# Patient Record
Sex: MALE | Race: BLACK OR AFRICAN AMERICAN | Employment: OTHER | URBAN - METROPOLITAN AREA
[De-identification: names, ages, dates, MRNs, and addresses within clinical notes are randomized per-mention and may not be internally consistent; named-entity substitution may affect disease eponyms.]

---

## 2017-05-05 ENCOUNTER — OP HISTORICAL/CONVERTED ENCOUNTER (OUTPATIENT)
Dept: OTHER | Age: 62
End: 2017-05-05

## 2017-11-28 ENCOUNTER — OP HISTORICAL/CONVERTED ENCOUNTER (OUTPATIENT)
Dept: OTHER | Age: 62
End: 2017-11-28

## 2020-09-08 ENCOUNTER — TELEPHONE (OUTPATIENT)
Dept: FAMILY MEDICINE CLINIC | Age: 65
End: 2020-09-08

## 2020-09-08 DIAGNOSIS — K12.2 ORAL INFECTION: Primary | ICD-10-CM

## 2020-09-08 RX ORDER — AMOXICILLIN 500 MG/1
500 CAPSULE ORAL 3 TIMES DAILY
Qty: 30 CAP | Refills: 1 | Status: SHIPPED | OUTPATIENT
Start: 2020-09-08 | End: 2022-08-19

## 2020-09-08 RX ORDER — IBUPROFEN 600 MG/1
600 TABLET ORAL
Qty: 120 TAB | Refills: 1 | Status: SHIPPED | OUTPATIENT
Start: 2020-09-08 | End: 2021-04-15 | Stop reason: SDUPTHER

## 2020-09-08 NOTE — PROGRESS NOTES
1. Oral infection    - amoxicillin (AMOXIL) 500 mg capsule; Take 1 Cap by mouth three (3) times daily for 10 days. Dispense: 30 Cap; Refill: 1  - ibuprofen (MOTRIN) 600 mg tablet; Take 1 Tab by mouth every six (6) hours as needed for Pain. Dispense: 120 Tab;  Refill: 1

## 2020-11-12 ENCOUNTER — OFFICE VISIT (OUTPATIENT)
Dept: FAMILY MEDICINE CLINIC | Age: 65
End: 2020-11-12
Payer: COMMERCIAL

## 2020-11-12 VITALS
HEIGHT: 69 IN | WEIGHT: 190 LBS | TEMPERATURE: 97.3 F | BODY MASS INDEX: 28.14 KG/M2 | DIASTOLIC BLOOD PRESSURE: 88 MMHG | OXYGEN SATURATION: 96 % | SYSTOLIC BLOOD PRESSURE: 159 MMHG | HEART RATE: 77 BPM

## 2020-11-12 DIAGNOSIS — I10 ESSENTIAL HYPERTENSION: ICD-10-CM

## 2020-11-12 DIAGNOSIS — E78.2 MIXED HYPERLIPIDEMIA: ICD-10-CM

## 2020-11-12 DIAGNOSIS — E11.9 TYPE 2 DIABETES MELLITUS WITHOUT COMPLICATION, WITHOUT LONG-TERM CURRENT USE OF INSULIN (HCC): ICD-10-CM

## 2020-11-12 DIAGNOSIS — N52.9 ERECTILE DYSFUNCTION, UNSPECIFIED ERECTILE DYSFUNCTION TYPE: ICD-10-CM

## 2020-11-12 DIAGNOSIS — Z12.5 SCREENING FOR PROSTATE CANCER: ICD-10-CM

## 2020-11-12 DIAGNOSIS — Z00.00 ENCOUNTER FOR ROUTINE ADULT HEALTH EXAMINATION WITHOUT ABNORMAL FINDINGS: Primary | ICD-10-CM

## 2020-11-12 PROCEDURE — 99397 PER PM REEVAL EST PAT 65+ YR: CPT | Performed by: FAMILY MEDICINE

## 2020-11-12 RX ORDER — ATORVASTATIN CALCIUM 20 MG/1
TABLET, FILM COATED ORAL DAILY
COMMUNITY
End: 2021-04-15 | Stop reason: SDUPTHER

## 2020-11-12 RX ORDER — GUAIFENESIN 100 MG/5ML
81 LIQUID (ML) ORAL DAILY
COMMUNITY

## 2020-11-12 RX ORDER — SILDENAFIL 100 MG/1
100 TABLET, FILM COATED ORAL AS NEEDED
COMMUNITY
End: 2021-12-22 | Stop reason: ALTCHOICE

## 2020-11-12 RX ORDER — INSULIN GLARGINE 300 U/ML
INJECTION, SOLUTION SUBCUTANEOUS
Qty: 15 ML | Refills: 5 | Status: SHIPPED | OUTPATIENT
Start: 2020-11-12 | End: 2021-12-22 | Stop reason: DRUGHIGH

## 2020-11-12 RX ORDER — IBUPROFEN 600 MG/1
TABLET ORAL
COMMUNITY
End: 2020-12-30 | Stop reason: SDUPTHER

## 2020-11-12 RX ORDER — NAPROXEN SODIUM 220 MG
220 TABLET ORAL 2 TIMES DAILY WITH MEALS
COMMUNITY
End: 2021-12-22 | Stop reason: ALTCHOICE

## 2020-11-12 RX ORDER — LISINOPRIL 40 MG/1
40 TABLET ORAL DAILY
COMMUNITY
End: 2021-01-18

## 2020-11-12 NOTE — PATIENT INSTRUCTIONS
     
Routine Health maintenance: You need to get a yearly follow up/physical exam to review, discuss age and gender appropriate exams, labs, vaccines and screening tests. This includes cardiovascular health risk, cancer screens and other lonny related topics. Medications-take all medications as directed. Please do not stop unless you talk to your doctor or health care provider first. Report any problems immediately. Referrals: if you have been given a referral, please call the office if you do not hear from provider in one week. You may make the appointment yourself. Please keep all appointments with specialists and ask them to send their notes, thoughts, recommendations to us , as your PCP. Imaging/Labs:  Be sure to get these images in a timely manner. IF your test must be scheduled, let us know if you need help getting this done and if you do not hear from that provider in a week , call us or them. BE SURE to call the office if you do not hear regarding the results in one week after the test is performed Image or lab). It is our intention to inform you of the results ALWAYS, even if normal you should get a notification (Call, portal message). PLEASE isabel if you do not get the results. PLEASE follow all recommendations and call/come in /ask questions if you do not understand of if problems develop after or in between visits. Failure to comply with recommended health care advise could result in serious health consequences. Thank you for choosing our practice and please let us know how we can help you feel better and stay well!

## 2020-11-12 NOTE — PROGRESS NOTES
ID:  Inocencio Saldivar. Nelson Hanna , 77 y.o., male      CHIEF COMPLAINT:   Chief Complaint   Patient presents with    Physical         HISTORY OF PRESENT ILLNESS:    Ck Fitzgerald is a 77 y.o. male with the below listed medical problems whom comes in today for follow up yearly. 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 He is having no chest pain, shortness breath, orthopnea or PND. He continues to smoke. He denies any abdominal pain or nausea. He does need a refill of his insulin. He says his sugars are \"about the same \". He has no numbness, burning, pain, weakness in his arms or legs. PAST MEDICAL HISTORY:   Patient Active Problem List   Diagnosis Code    Hypertension I10    Hyperlipidemia E78.5    Erectile dysfunction N52.9    Diabetes ([facility] [address] E11.9        ALLERGIES:   No Known Allergies      MEDICATIONS:     Current Outpatient Medications:     aspirin (Nino Chewable Aspirin) 81 mg chewable tablet, Take 81 mg by mouth daily. , Disp: , Rfl:     sildenafil citrate (VIAGRA) 100 mg tablet, Take 100 mg by mouth as needed for Erectile Dysfunction. , Disp: , Rfl:     lisinopriL (PRINIVIL, ZESTRIL) 40 mg tablet, Take 40 mg by mouth daily. , Disp: , Rfl:     atorvastatin (LIPITOR) 20 mg tablet, Take  by mouth daily. , Disp: , Rfl:     naproxen sodium (Aleve) 220 mg tablet, Take 220 mg by mouth two (2) times daily (with meals). , Disp: , Rfl:     ibuprofen (MOTRIN) 600 mg tablet, Take  by mouth every six (6) hours as needed for Pain., Disp: , Rfl:     SITagliptin (Januvia) 50 mg tablet, Take 50 mg by mouth daily. , Disp: , Rfl:     insulin glargine U-300 conc (Toujeo Max U-300 SoloStar) 300 unit/mL (3 mL) inpn, 70 units subcutaneously every day, Disp: 15 mL, Rfl: 5    SOCIAL:   Social History     Tobacco Use    Smoking status: Current Every Day Smoker     Packs/day: 0.25    Smokeless tobacco: Never Used   Substance Use Topics    Alcohol use: Not Currently    Drug use: Never       SURGERIES:   History reviewed. No pertinent surgical history. FAMILY HX:  History reviewed. No pertinent family history. REVIEW OF SYSTEMS:  I personally collected this information from all available source present (patient/others in room and records available) -JLEWISDO    Review of Systems   Constitutional: Negative for activity change, appetite change, chills, diaphoresis, fever and unexpected weight change. HENT: Negative for congestion, ear discharge, ear pain, hearing loss, rhinorrhea, sinus pressure, sneezing, sore throat, tinnitus, trouble swallowing and voice change. Eyes: Negative for photophobia, pain, discharge and visual disturbance. Respiratory: Negative for cough, chest tightness, shortness of breath and wheezing. Cardiovascular: Negative for chest pain, palpitations and leg swelling. Gastrointestinal: Negative for abdominal distention, abdominal pain, blood in stool, constipation, diarrhea, nausea and vomiting. Endocrine: Negative for cold intolerance, heat intolerance, polydipsia and polyphagia. Genitourinary: Negative for difficulty urinating, dysuria, frequency, hematuria and urgency. Musculoskeletal: Positive for arthralgias, back pain, joint swelling and myalgias. Negative for gait problem and neck pain. Skin: Negative for color change and rash. Neurological: Negative for dizziness, tremors, syncope, speech difficulty, weakness, light-headedness, numbness and headaches. Hematological: Negative for adenopathy. Does not bruise/bleed easily. Psychiatric/Behavioral: Negative for agitation, behavioral problems, confusion, decreased concentration, dysphoric mood, hallucinations, sleep disturbance and suicidal ideas. The patient is not nervous/anxious.            VITAL SIGNS:   Visit Vitals  BP (!) 159/88 (BP 1 Location: Left arm, BP Patient Position: Sitting)   Pulse 77   Temp 97.3 °F (36.3 °C) (Temporal)   Ht 5' 9\" (1.753 m)   Wt 190 lb (86.2 kg)   SpO2 96%   BMI 28.06 kg/m²           PHYSICAL EXAMINATION:  Physical Exam  Nursing note reviewed. Constitutional:       General: He is not in acute distress. Appearance: Normal appearance. He is not ill-appearing or toxic-appearing. HENT:      Right Ear: Tympanic membrane, ear canal and external ear normal.      Left Ear: Tympanic membrane, ear canal and external ear normal.      Nose: No congestion or rhinorrhea. Mouth/Throat:      Pharynx: No oropharyngeal exudate or posterior oropharyngeal erythema. Eyes:      General: No scleral icterus. Extraocular Movements: Extraocular movements intact. Conjunctiva/sclera: Conjunctivae normal.      Pupils: Pupils are equal, round, and reactive to light. Neck:      Musculoskeletal: No neck rigidity or muscular tenderness. Vascular: No carotid bruit. Cardiovascular:      Rate and Rhythm: Normal rate and regular rhythm. Heart sounds: No murmur. No friction rub. No gallop. Pulmonary:      Effort: Pulmonary effort is normal.      Breath sounds: Normal breath sounds. No wheezing, rhonchi or rales. Abdominal:      General: Bowel sounds are normal. There is no distension. Palpations: Abdomen is soft. There is no mass. Tenderness: There is no abdominal tenderness. Musculoskeletal:         General: Tenderness and deformity present. No swelling. Right lower leg: No edema. Left lower leg: No edema. Comments: Arthritic hands and knees. Lymphadenopathy:      Cervical: No cervical adenopathy. Skin:     Capillary Refill: Capillary refill takes less than 2 seconds. Coloration: Skin is not jaundiced. Findings: No erythema or rash. Neurological:      General: No focal deficit present. Mental Status: He is alert and oriented to person, place, and time. Mental status is at baseline. Cranial Nerves: No cranial nerve deficit. Sensory: No sensory deficit.       Coordination: Coordination normal.      Gait: Gait normal.   Psychiatric:         Mood and Affect: Mood normal.         Behavior: Behavior normal.         Thought Content: Thought content normal.         Judgment: Judgment normal.         ASSESSMENT/PLAN:    The following diagnoses were identified today, reviewed and discussed. 1. Encounter for routine adult health examination without abnormal findings    The patient and I discussed all age and gender appropriate screening exams. Risk of non compliance discussed including missing early, treatable and possibly curable serious health issues. Labs, imaging and vaccinations as well as any other pertinent testing was ordered if appropriate.        - METABOLIC PANEL, COMPREHENSIVE  - LIPID PANEL  - URINALYSIS W/ RFLX MICROSCOPIC    2. Type 2 diabetes mellitus without complication, without long-term current use of insulin (HCC)    - HEMOGLOBIN A1C WITH EAG  - insulin glargine U-300 conc (Toujeo Max U-300 SoloStar) 300 unit/mL (3 mL) inpn; 70 units subcutaneously every day  Dispense: 15 mL; Refill: 5    3. Essential hypertension    - TSH 3RD GENERATION  - URINALYSIS W/ RFLX MICROSCOPIC    4. Mixed hyperlipidemia      5. Erectile dysfunction, unspecified erectile dysfunction type      6. Screening for prostate cancer    - PSA SCREENING (SCREENING)          Discussion:    The patient and I discussed the following items/issues; Each diagnosis listed for today's visit including medications, treatment, testing such as labs, imagine, referrals and when to call regarding results and appointments. Reminded patient to keep any and all appointments with specialists, labs, imaging. Reminded patient to make sure we get copies of any specialists care, labs and imaging. Reminded patient to call of come by the office if there are any concerns, questions , comments or problems.     The patient verbalized understanding of the care plan and all questions were answered to the patient's satisfaction prior to leaving the office. The patient was told that failure to comply with recommended testing could result in abnormal health consequences. The patient was instructed to have yearly routine health maintenance including but not limited to age appropriate vaccines, testing, screening exams. The patient actively participated in medical decision making. FOLLOW UP:   Patient knows to keep any and all future visits scheduled unless told otherwise. Patient knows to call, come back if any concerns, questions, comments or problems arise. This visit may have been completed , in part, with voice recognition software as well as typing and may have syntax errors despite editing.       Leia Peabody, DO

## 2020-12-01 LAB
ALBUMIN SERPL-MCNC: 4.4 G/DL (ref 3.8–4.8)
ALBUMIN/GLOB SERPL: 1.6 {RATIO} (ref 1.2–2.2)
ALP SERPL-CCNC: 71 IU/L (ref 39–117)
ALT SERPL-CCNC: 39 IU/L (ref 0–44)
APPEARANCE UR: CLEAR
AST SERPL-CCNC: 24 IU/L (ref 0–40)
BILIRUB SERPL-MCNC: 0.3 MG/DL (ref 0–1.2)
BILIRUB UR QL STRIP: NEGATIVE
BUN SERPL-MCNC: 13 MG/DL (ref 8–27)
BUN/CREAT SERPL: 10 (ref 10–24)
CALCIUM SERPL-MCNC: 9.7 MG/DL (ref 8.6–10.2)
CHLORIDE SERPL-SCNC: 100 MMOL/L (ref 96–106)
CHOLEST SERPL-MCNC: 151 MG/DL (ref 100–199)
CO2 SERPL-SCNC: 24 MMOL/L (ref 20–29)
COLOR UR: YELLOW
CREAT SERPL-MCNC: 1.25 MG/DL (ref 0.76–1.27)
EST. AVERAGE GLUCOSE BLD GHB EST-MCNC: 243 MG/DL
GLOBULIN SER CALC-MCNC: 2.7 G/DL (ref 1.5–4.5)
GLUCOSE SERPL-MCNC: 145 MG/DL (ref 65–99)
GLUCOSE UR QL: NEGATIVE
HBA1C MFR BLD: 10.1 % (ref 4.8–5.6)
HDLC SERPL-MCNC: 40 MG/DL
HGB UR QL STRIP: NEGATIVE
KETONES UR QL STRIP: ABNORMAL
LDLC SERPL CALC-MCNC: 87 MG/DL (ref 0–99)
LEUKOCYTE ESTERASE UR QL STRIP: NEGATIVE
MICRO URNS: ABNORMAL
NITRITE UR QL STRIP: NEGATIVE
PH UR STRIP: 5.5 [PH] (ref 5–7.5)
POTASSIUM SERPL-SCNC: 4.9 MMOL/L (ref 3.5–5.2)
PROT SERPL-MCNC: 7.1 G/DL (ref 6–8.5)
PROT UR QL STRIP: ABNORMAL
PSA SERPL-MCNC: <0.1 NG/ML (ref 0–4)
SODIUM SERPL-SCNC: 138 MMOL/L (ref 134–144)
SP GR UR: 1.03 (ref 1–1.03)
TRIGL SERPL-MCNC: 135 MG/DL (ref 0–149)
TSH SERPL DL<=0.005 MIU/L-ACNC: 0.85 UIU/ML (ref 0.45–4.5)
UROBILINOGEN UR STRIP-MCNC: 1 MG/DL (ref 0.2–1)
VLDLC SERPL CALC-MCNC: 24 MG/DL (ref 5–40)

## 2020-12-01 NOTE — PROGRESS NOTES
Liver and kidney function are normal  Cholesterol is great  Thyroid and prostate are normal!    Urine is christiano (has some ketones but that is from fasting)  Sugar is not good, A1c is 10!  Go up to 80 on the insulin (his chart says he is on 70 , if so then go up  To 80)

## 2020-12-30 RX ORDER — IBUPROFEN 600 MG/1
600 TABLET ORAL
Qty: 90 TAB | Refills: 5 | Status: SHIPPED | OUTPATIENT
Start: 2020-12-30 | End: 2021-06-08 | Stop reason: SDUPTHER

## 2021-01-18 RX ORDER — LISINOPRIL 40 MG/1
TABLET ORAL
Qty: 90 TAB | Refills: 1 | Status: SHIPPED | OUTPATIENT
Start: 2021-01-18 | End: 2021-05-11

## 2021-02-02 ENCOUNTER — TELEPHONE (OUTPATIENT)
Dept: FAMILY MEDICINE CLINIC | Age: 66
End: 2021-02-02

## 2021-02-02 DIAGNOSIS — E11.9 CONTROLLED TYPE 2 DIABETES MELLITUS WITHOUT COMPLICATION, WITHOUT LONG-TERM CURRENT USE OF INSULIN (HCC): ICD-10-CM

## 2021-02-03 RX ORDER — SITAGLIPTIN AND METFORMIN HYDROCHLORIDE 50; 500 MG/1; MG/1
1 TABLET, FILM COATED, EXTENDED RELEASE ORAL 2 TIMES DAILY WITH MEALS
Qty: 60 TAB | Refills: 6 | Status: SHIPPED | OUTPATIENT
Start: 2021-02-03 | End: 2022-03-18 | Stop reason: DRUGHIGH

## 2021-02-03 RX ORDER — ATORVASTATIN CALCIUM 20 MG/1
20 TABLET, FILM COATED ORAL DAILY
Qty: 30 TAB | Refills: 5 | Status: SHIPPED | OUTPATIENT
Start: 2021-02-03 | End: 2021-11-08

## 2021-02-03 NOTE — TELEPHONE ENCOUNTER
Identifying Data:  77 y.o. , Yocasta Abreu , male     Historical Data Reviewed ; Allergies-  No Known Allergies  Current medication as of last visit and reconciliation-  Current Outpatient Medications on File Prior to Visit   Medication Sig Dispense Refill    ibuprofen (MOTRIN) 600 mg tablet Take 1 Tab by mouth every six (6) hours as needed for Pain. 120 Tab 1    TOUJEO SOLOSTAR 300 unit/mL (1.5 mL) inpn INJECT 60 UNITS UNDER THE SKIN AT NIGHT STOP LANTUS 4.5 mL 6    AFREZZA 4 unit CtDv INHALE 4 UNITS BY MOUTH BEFORE BREAKFAST, LUNCH AND DINNER. MAX OF 12 UNITS 90 g 6    metoprolol succinate (TOPROL-XL) 50 mg XL tablet Take 50 mg by mouth daily. 5    lisinopril (PRINIVIL, ZESTRIL) 40 mg tablet Take 40 mg by mouth daily. 5    aspirin 81 mg chewable tablet Take 81 mg by mouth daily.  glucose blood VI test strips (ONETOUCH VERIO) strip Test 3 times daily. Dx code 250.00 100 Strip 6    Lancets (ONE TOUCH DELICA) misc Test 3 times daily. Dx code 250.00 100 Each 6    [DISCONTINUED] atorvastatin (LIPITOR) 20 mg tablet Take 20 mg by mouth daily. 5    [DISCONTINUED] sitaGLIPtin-metFORMIN (JANUMET XR)  mg TM24 Take 1 Tab by mouth two (2) times daily (with meals). To use with free trial offer and savings card 60 Tab 6    docusate sodium (COLACE) 100 mg capsule Take 1 Cap by mouth two (2) times a day. 60 Cap 2     No current facility-administered medications on file prior to visit. Past Medical History-  Patient Active Problem List   Diagnosis Code    Prostate cancer ([facility] [address] C61       Assessment and Plan:    ICD-10-CM ICD-9-CM    1.  Controlled type 2 diabetes mellitus without complication, without long-term current use of insulin (HCC)  E11.9 250.00 atorvastatin (LIPITOR) 20 mg tablet      SITagliptin-metFORMIN (Janumet XR)  mg TM24           Kath Grade, DO

## 2021-04-15 ENCOUNTER — OFFICE VISIT (OUTPATIENT)
Dept: FAMILY MEDICINE CLINIC | Age: 66
End: 2021-04-15
Payer: COMMERCIAL

## 2021-04-15 ENCOUNTER — TELEPHONE (OUTPATIENT)
Dept: FAMILY MEDICINE CLINIC | Age: 66
End: 2021-04-15

## 2021-04-15 VITALS
TEMPERATURE: 97.8 F | DIASTOLIC BLOOD PRESSURE: 82 MMHG | HEART RATE: 90 BPM | HEIGHT: 69 IN | OXYGEN SATURATION: 97 % | BODY MASS INDEX: 28.5 KG/M2 | SYSTOLIC BLOOD PRESSURE: 145 MMHG | WEIGHT: 192.4 LBS

## 2021-04-15 DIAGNOSIS — E11.9 TYPE 2 DIABETES MELLITUS WITHOUT COMPLICATION, WITHOUT LONG-TERM CURRENT USE OF INSULIN (HCC): ICD-10-CM

## 2021-04-15 DIAGNOSIS — K04.7 DENTAL ABSCESS: Primary | ICD-10-CM

## 2021-04-15 PROCEDURE — 99213 OFFICE O/P EST LOW 20 MIN: CPT | Performed by: FAMILY MEDICINE

## 2021-04-15 RX ORDER — CHLORHEXIDINE GLUCONATE 1.2 MG/ML
15 RINSE ORAL EVERY 12 HOURS
Qty: 420 ML | Refills: 0 | Status: SHIPPED | OUTPATIENT
Start: 2021-04-15 | End: 2021-04-29

## 2021-04-15 RX ORDER — PENICILLIN V POTASSIUM 500 MG/1
500 TABLET, FILM COATED ORAL 4 TIMES DAILY
Qty: 40 TAB | Refills: 0 | Status: SHIPPED | OUTPATIENT
Start: 2021-04-15 | End: 2021-09-09 | Stop reason: SDUPTHER

## 2021-04-15 NOTE — PROGRESS NOTES
IDENTIFYING INFORMATION:  Nazanin Ricks. Amara Quach , 77 y.o., male  [address] [address]     CHIEF COMPLAINT:     Chief Complaint   Patient presents with    Swelling     c/o swelling in right side of face. HISTORY OF PRESENT ILLNESS:    [facility] Ramu is a 77 y.o. male  has a past medical history of Cancer ([facility] [address] Colloid cyst of brain ([facility] [address] (6/2011), Constipation, Diabetes ([address] Diabetes ([address] Erectile dysfunction, GERD (gastroesophageal reflux disease), Hyperlipidemia, Hypertension, and Migraines. .  he comes in for pain right upper dental area into cheek. Been two weeks  Had a few penicillin left, no help. No fever, chills, sweats. Does have poorly controlled diabetes. Trying to get in with DDS. PAST MEDICAL HISTORY:     Past Medical History:   Diagnosis Date    Cancer [facility])     prostate    Colloid cyst of brain ([facility] [address] 6/2011    S/P excision cyst at [address]    Constipation     Diabetes ([facility] [address]     IDDM    Diabetes ([facility] [address]     Erectile dysfunction     GERD (gastroesophageal reflux disease)     Hyperlipidemia     Hypertension     Migraines        MEDICATIONS:     Current Outpatient Medications on File Prior to Visit   Medication Sig Dispense Refill    atorvastatin (LIPITOR) 20 mg tablet Take 1 Tab by mouth daily. 30 Tab 5    SITagliptin-metFORMIN (Janumet XR)  mg TM24 Take 1 Tab by mouth two (2) times daily (with meals). To use with free trial offer and savings card 60 Tab 6    lisinopriL (PRINIVIL, ZESTRIL) 40 mg tablet TAKE 1 TABLET BY MOUTH EVERY DAY 90 Tab 1    ibuprofen (MOTRIN) 600 mg tablet Take 1 Tab by mouth every six (6) hours as needed for Pain. 90 Tab 5    aspirin (Nino Chewable Aspirin) 81 mg chewable tablet Take 81 mg by mouth daily.  sildenafil citrate (VIAGRA) 100 mg tablet Take 100 mg by mouth as needed for Erectile Dysfunction.  naproxen sodium (Aleve) 220 mg tablet Take 220 mg by mouth two (2) times daily (with meals).       insulin glargine U-300 conc (Toujeo Max U-300 SoloStar) 300 unit/mL (3 mL) inpn 70 units subcutaneously every day 15 mL 5    AFREZZA 4 unit CtDv INHALE 4 UNITS BY MOUTH BEFORE BREAKFAST, LUNCH AND DINNER. MAX OF 12 UNITS 90 g 6    metoprolol succinate (TOPROL-XL) 50 mg XL tablet Take 50 mg by mouth daily. 5    glucose blood VI test strips (ONETOUCH VERIO) strip Test 3 times daily. Dx code 250.00 100 Strip 6    Lancets (ONE TOUCH DELICA) misc Test 3 times daily. Dx code 250.00 100 Each 6    docusate sodium (COLACE) 100 mg capsule Take 1 Cap by mouth two (2) times a day. 60 Cap 2    [DISCONTINUED] atorvastatin (LIPITOR) 20 mg tablet Take  by mouth daily.  [DISCONTINUED] SITagliptin (Januvia) 50 mg tablet Take 50 mg by mouth daily.  [DISCONTINUED] ibuprofen (MOTRIN) 600 mg tablet Take 1 Tab by mouth every six (6) hours as needed for Pain. 120 Tab 1    [DISCONTINUED] TOUJEO SOLOSTAR 300 unit/mL (1.5 mL) inpn INJECT 60 UNITS UNDER THE SKIN AT NIGHT STOP LANTUS 4.5 mL 6    [DISCONTINUED] lisinopril (PRINIVIL, ZESTRIL) 40 mg tablet Take 40 mg by mouth daily. 5    [DISCONTINUED] aspirin 81 mg chewable tablet Take 81 mg by mouth daily. No current facility-administered medications on file prior to visit.         ALLERGIES:    No Known Allergies      SOCIAL HISTORY:     Social History     Tobacco Use    Smoking status: Current Every Day Smoker     Packs/day: 0.25    Smokeless tobacco: Never Used   Substance Use Topics    Alcohol use: Not Currently    Drug use: Never       SURGICAL HISTORY:    Past Surgical History:   Procedure Laterality Date    HX GI  2012    HX HEENT  2006    exc mass right neck    HX OTHER SURGICAL  2012    brain    HX UROLOGICAL      prostate bx    NEUROLOGICAL PROCEDURE UNLISTED  6-2011    removal cranial colloid cyst at [address] [facility],[address]:    Family History   Problem Relation Age of Onset    Diabetes Mother     Heart Disease Mother     Stroke Mother     Heart Disease Father     Stroke Father     Hypertension Brother     Elevated Lipids Brother          REVIEW OF SYSTEMS:    I personally collected this information from all available source present (patient/others in room and records available) -JLEWISDO    Review of Systems   Constitutional: Negative for chills, diaphoresis, fever and weight loss. HENT: Positive for congestion, sinus pain and sore throat. Negative for ear pain, hearing loss, nosebleeds and tinnitus. Eyes: Negative for blurred vision, double vision, photophobia and pain. Respiratory: Negative for cough, sputum production and shortness of breath. Cardiovascular: Negative for chest pain, palpitations, orthopnea, claudication, leg swelling and PND. Gastrointestinal: Negative for abdominal pain, blood in stool, constipation, diarrhea, heartburn, melena, nausea and vomiting. Genitourinary: Negative for dysuria, frequency and urgency. Musculoskeletal: Positive for joint pain. Negative for back pain, falls, myalgias and neck pain. Skin: Negative for itching and rash. Neurological: Negative for dizziness, tingling, tremors, sensory change, focal weakness and headaches. Psychiatric/Behavioral: Negative for depression and suicidal ideas. The patient is not nervous/anxious and does not have insomnia. PHYSICAL EXAMINATION:    Vital Signs:    Visit Vitals  BP (!) 145/82 (BP 1 Location: Left upper arm, BP Patient Position: Sitting)   Pulse 90   Temp 97.8 °F (36.6 °C) (Temporal)   Ht 5' 9\" (1.753 m)   Wt 192 lb 6.4 oz (87.3 kg)   SpO2 97%   BMI 28.41 kg/m²         Wt Readings from Last 3 Encounters:   04/15/21 192 lb 6.4 oz (87.3 kg)   11/12/20 190 lb (86.2 kg)   04/29/15 204 lb (92.5 kg)     BP Readings from Last 3 Encounters:   04/15/21 (!) 145/82   11/12/20 (!) 159/88   04/29/15 (!) 152/98         Physical Exam  Vitals signs reviewed.    Constitutional:       General: He is in acute distress (He looks uncomfortable but not toxic.). Appearance: He is not ill-appearing or toxic-appearing. HENT:      Right Ear: Tympanic membrane, ear canal and external ear normal.      Left Ear: Tympanic membrane, ear canal and external ear normal.      Nose: Congestion and rhinorrhea present. Mouth/Throat:      Mouth: Mucous membranes are moist.      Pharynx: Posterior oropharyngeal erythema present. Comments: Right upper incisor is definitely decayed and swollen and some pain up into the sinus area. That area is swollen and very tender. Eyes:      General: No scleral icterus. Extraocular Movements: Extraocular movements intact. Conjunctiva/sclera: Conjunctivae normal.      Pupils: Pupils are equal, round, and reactive to light. Neck:      Musculoskeletal: No neck rigidity or muscular tenderness. Vascular: No carotid bruit. Cardiovascular:      Rate and Rhythm: Normal rate and regular rhythm. Pulses: Normal pulses. Heart sounds: No murmur. No friction rub. No gallop. Pulmonary:      Effort: Pulmonary effort is normal.      Breath sounds: Normal breath sounds. No wheezing, rhonchi or rales. Abdominal:      General: There is no distension. Palpations: Abdomen is soft. There is no mass. Tenderness: There is no abdominal tenderness. There is no guarding. Musculoskeletal:         General: Tenderness and deformity present. No swelling or signs of injury. Right lower leg: No edema. Left lower leg: No edema. Lymphadenopathy:      Cervical: No cervical adenopathy. Skin:     General: Skin is warm and dry. Capillary Refill: Capillary refill takes less than 2 seconds. Coloration: Skin is not jaundiced. Findings: No bruising, erythema or rash. Neurological:      General: No focal deficit present. Mental Status: He is alert and oriented to person, place, and time. Cranial Nerves: No cranial nerve deficit. Sensory: No sensory deficit.       Motor: No weakness. Coordination: Coordination normal.      Gait: Gait normal.      Deep Tendon Reflexes: Reflexes normal.   Psychiatric:         Mood and Affect: Mood normal.         Behavior: Behavior normal.         Thought Content: Thought content normal.         Judgment: Judgment normal.         ASSESSMENT/PLAN:    Discussion (regarding today's visit with Kyra Woodson);        ICD-10-CM ICD-9-CM    1. Dental abscess  K04.7 522. 5 penicillin v potassium (VEETID) 500 mg tablet      chlorhexidine (PERIDEX) 0.12 % solution   2. Type 2 diabetes mellitus without complication, without long-term current use of insulin ([facility])  E11.9 250.00      WE reviewed each diagnosis listed for today's visit including medications, treatment, testing such as labs, imagine, referrals and when to call regarding results and appointments. Reminded patient to keep any and all appointments with specialists, labs, imaging. Reminded patient to make sure we get copies of any specialists care, labs and imaging. Reminded patient to call of come by the office if there are any concerns, questions , comments or problems. The patient verbalized understanding of the care plan and all questions were answered to the patient's satisfaction prior to leaving the office. The patient was told that failure to comply with recommended testing could result in abnormal health consequences. The patient was instructed to have yearly routine health maintenance including but not limited to age appropriate vaccines, testing, screening exams. ALL questions were answered to his satisfaction before leaving the office. The patient actively participated in medical decision making. FOLLOW UP:     Patient knows to keep any and all future visits scheduled unless told otherwise. Patient knows to call, come back if any concerns, questions, comments or problems arise.         Follow-up and Dispositions    · Return if symptoms worsen or fail to improve. This visit may have been completed , in part, with voice recognition software as well as typing and may have syntax errors despite editing.       Reynold Mayberry, DO

## 2021-04-15 NOTE — PROGRESS NOTES
1. Have you been to the ER, urgent care clinic since your last visit? Hospitalized since your last visit? No    2. Have you seen or consulted any other health care providers outside of the [address] [place] [address] since your last visit? Include any pap smears or colon screening. No    Chief Complaint   Patient presents with    Swelling     c/o swelling in right side of face.        Visit Vitals  BP (!) 157/81 (BP 1 Location: Left upper arm, BP Patient Position: Sitting)   Pulse 96   Temp 97.8 °F (36.6 °C) (Temporal)   Ht 5' 9\" (1.753 m)   Wt 192 lb 6.4 oz (87.3 kg)   SpO2 97%   BMI 28.41 kg/m²

## 2021-05-11 ENCOUNTER — TELEPHONE (OUTPATIENT)
Dept: FAMILY MEDICINE CLINIC | Age: 66
End: 2021-05-11

## 2021-05-11 DIAGNOSIS — Z29.8 SBE (SUBACUTE BACTERIAL ENDOCARDITIS) PROPHYLAXIS CANDIDATE: Primary | ICD-10-CM

## 2021-05-11 RX ORDER — LISINOPRIL 40 MG/1
TABLET ORAL
Qty: 90 TAB | Refills: 1 | Status: SHIPPED | OUTPATIENT
Start: 2021-05-11 | End: 2022-03-22 | Stop reason: SDUPTHER

## 2021-05-14 RX ORDER — PENICILLIN V POTASSIUM 500 MG/1
TABLET, FILM COATED ORAL
Qty: 4 TAB | Refills: 2 | Status: SHIPPED | OUTPATIENT
Start: 2021-05-14 | End: 2021-12-22 | Stop reason: ALTCHOICE

## 2021-05-14 NOTE — TELEPHONE ENCOUNTER
Identifying Data:  77 y.o. , Sloane Marcos , male     HISTORICAL DATA (REVIEWED TODAY):  ALLERGIES-    No Known Allergies    MEDICATION AS OF LAST RECONCILIATION (NOT INCLUDING CHANGES MADE TODAY):    Current Outpatient Medications on File Prior to Visit   Medication Sig Dispense Refill    lisinopriL (PRINIVIL, ZESTRIL) 40 mg tablet TAKE 1 TABLET BY MOUTH EVERY DAY 90 Tab 1    penicillin v potassium (VEETID) 500 mg tablet Take 1 Tab by mouth four (4) times daily. 40 Tab 0    atorvastatin (LIPITOR) 20 mg tablet Take 1 Tab by mouth daily. 30 Tab 5    SITagliptin-metFORMIN (Janumet XR)  mg TM24 Take 1 Tab by mouth two (2) times daily (with meals). To use with free trial offer and savings card 60 Tab 6    ibuprofen (MOTRIN) 600 mg tablet Take 1 Tab by mouth every six (6) hours as needed for Pain. 90 Tab 5    aspirin (Nino Chewable Aspirin) 81 mg chewable tablet Take 81 mg by mouth daily.  sildenafil citrate (VIAGRA) 100 mg tablet Take 100 mg by mouth as needed for Erectile Dysfunction.  naproxen sodium (Aleve) 220 mg tablet Take 220 mg by mouth two (2) times daily (with meals).  insulin glargine U-300 conc (Toujeo Max U-300 SoloStar) 300 unit/mL (3 mL) inpn 70 units subcutaneously every day 15 mL 5    AFREZZA 4 unit CtDv INHALE 4 UNITS BY MOUTH BEFORE BREAKFAST, LUNCH AND DINNER. MAX OF 12 UNITS 90 g 6    metoprolol succinate (TOPROL-XL) 50 mg XL tablet Take 50 mg by mouth daily. 5    glucose blood VI test strips (ONETOUCH VERIO) strip Test 3 times daily. Dx code 250.00 100 Strip 6    Lancets (ONE TOUCH DELICA) misc Test 3 times daily. Dx code 250.00 100 Each 6    docusate sodium (COLACE) 100 mg capsule Take 1 Cap by mouth two (2) times a day. 60 Cap 2     No current facility-administered medications on file prior to visit.         PAST MEDICAL HISTORY:    Patient Active Problem List   Diagnosis Code    Prostate cancer ([facility] C61    Hypertension I10    Hyperlipidemia E78.5    Erectile dysfunction N52.9    Diabetes ([facility] [address] E11.9       ASSESSMENT AND PLAN:      ICD-10-CM ICD-9-CM    1.  SBE (subacute bacterial endocarditis) prophylaxis candidate  Z29.8 V07.8 penicillin v potassium (VEETID) 500 mg tablet             Queen Brigitte, DO

## 2021-06-08 ENCOUNTER — OFFICE VISIT (OUTPATIENT)
Dept: FAMILY MEDICINE CLINIC | Age: 66
End: 2021-06-08
Payer: COMMERCIAL

## 2021-06-08 VITALS
HEART RATE: 103 BPM | BODY MASS INDEX: 26.48 KG/M2 | HEIGHT: 70 IN | SYSTOLIC BLOOD PRESSURE: 146 MMHG | WEIGHT: 185 LBS | OXYGEN SATURATION: 96 % | DIASTOLIC BLOOD PRESSURE: 79 MMHG | RESPIRATION RATE: 18 BRPM | TEMPERATURE: 97.3 F

## 2021-06-08 DIAGNOSIS — E78.2 MIXED HYPERLIPIDEMIA: ICD-10-CM

## 2021-06-08 DIAGNOSIS — I10 ESSENTIAL HYPERTENSION: Primary | ICD-10-CM

## 2021-06-08 DIAGNOSIS — R52 PAIN: ICD-10-CM

## 2021-06-08 DIAGNOSIS — E11.9 TYPE 2 DIABETES MELLITUS WITHOUT COMPLICATION, WITHOUT LONG-TERM CURRENT USE OF INSULIN (HCC): ICD-10-CM

## 2021-06-08 PROCEDURE — 99214 OFFICE O/P EST MOD 30 MIN: CPT | Performed by: NURSE PRACTITIONER

## 2021-06-08 RX ORDER — IBUPROFEN 600 MG/1
600 TABLET ORAL
Qty: 90 TABLET | Refills: 5 | Status: SHIPPED | OUTPATIENT
Start: 2021-06-08 | End: 2022-05-17

## 2021-06-08 NOTE — PROGRESS NOTES
Chief Complaint   Patient presents with    Follow-up     Follow up 6 months for diabetes. - needs auth to have teeth pulled. (Pt of Dr Florance Felty)   [facility] Diabetes Care Management     Dental Pain     Needs to have teeth pulled. Needs clearance for dental work to be completed. He will bring form back to us. [facility] Dr. Angel Aranda location     Medication Refill     Patient needs refills on his medications and testing supplies, he his out of his insullin, testing supplies, ibuprofen     Labs     1. Have you been to the ER, urgent care clinic since your last visit? Hospitalized since your last visit? No    2. Have you seen or consulted any other health care providers outside of the [address] [place] [address] since your last visit? Include any pap smears or colon screening.  Yes Reason for visit: [facility] office      Visit Vitals  BP (!) 146/79 (BP 1 Location: Left arm, BP Patient Position: Sitting, BP Cuff Size: Adult)   Pulse (!) 103   Temp 97.3 °F (36.3 °C) (Temporal)   Resp 18   Ht 5' 10\" (1.778 m)   Wt 185 lb (83.9 kg)   SpO2 96%   BMI 26.54 kg/m²

## 2021-06-08 NOTE — PROGRESS NOTES
Sloane Marcos (: 1955) is a 77 y.o. male, established patient, here for evaluation of the following chief complaint(s):  Follow-up, Diabetes Care Management , Dental Pain, Medication Refill, and Labs       ASSESSMENT/PLAN:  Below is the assessment and plan developed based on review of pertinent history, physical exam, labs, studies, and medications. 1. Essential hypertension  -     CBC WITH AUTOMATED DIFF  -     METABOLIC PANEL, COMPREHENSIVE  -     LIPID PANEL  -     THYROID CASCADE PROFILE  -     MICROALBUMIN, UR, RAND W/ MICROALB/CREAT RATIO  -     HEMOGLOBIN A1C WITH EAG  2. Type 2 diabetes mellitus without complication, without long-term current use of insulin (HCC)  -     CBC WITH AUTOMATED DIFF  -     METABOLIC PANEL, COMPREHENSIVE  -     LIPID PANEL  -     THYROID CASCADE PROFILE  -     MICROALBUMIN, UR, RAND W/ MICROALB/CREAT RATIO  -     HEMOGLOBIN A1C WITH EAG  3. Mixed hyperlipidemia  -     CBC WITH AUTOMATED DIFF  -     METABOLIC PANEL, COMPREHENSIVE  -     LIPID PANEL  -     THYROID CASCADE PROFILE  -     MICROALBUMIN, UR, RAND W/ MICROALB/CREAT RATIO  -     HEMOGLOBIN A1C WITH EAG  4. Pain  -     ibuprofen (MOTRIN) 600 mg tablet; Take 1 Tablet by mouth every six (6) hours as needed for Pain., Normal, Disp-90 Tablet, R-5      Return in about 4 weeks (around 2021) for clerance for dental procedure, aic check bp check. SUBJECTIVE/OBJECTIVE:  HPI  Follow-up (Follow up 6 months for diabetes. - needs auth to have teeth pulled. (Pt of Dr Rusty Navas)),   Diabetes Care Management , Dental Pain (Needs to have teeth pulled. Needs clearance for dental work to be completed. He will bring form back to us.   [facility] Dr. Suzy Ramos location, does not check glucose regularly, ast a1c was 11, does take medication as directed ),   Medication Refill (Patient needs refills on his medications and testing supplies, he his out of his insullin, testing supplies, ibuprofen ),   Labs, have not done in a while  Patient presenting for hypertension followup. Patient reports blood pressures at home most frequently not checked. Patient has symptoms of none of the following; no chest pain, shortness of breath, weakness, orthostatic hypotension, cough, myalgias, rash, headaches, weight gain, leg swelling, palpitations, slow heart rate, fatigue, depression. Patient reports good compliance with medications, no side effects from medications noted. Current treatments include diet modification, smoking cessation. Review of Systems   All other systems reviewed and are negative. Visit Vitals  BP (!) 146/79 (BP 1 Location: Left arm, BP Patient Position: Sitting, BP Cuff Size: Adult)   Pulse (!) 103   Temp 97.3 °F (36.3 °C) (Temporal)   Resp 18   Ht 5' 10\" (1.778 m)   Wt 185 lb (83.9 kg)   SpO2 96%   BMI 26.54 kg/m²     Physical Exam  Constitutional:  No acute distress  HEENT:  Head normocephalic and atraumatic. CV:  Regular rate and rhythm. No murmur. Respiratory:  Lungs clear to auscultation bilaterally  Abdomen:  Soft, non-tender. Skin:  Normal color. Warm and Dry  Extremities:  Non-tender. No pedal edema. Back:  No tenderness  Neuro:  No gross motor deficits    On this date 06/08/2021 I have spent 31 minutes reviewing previous notes, test results and face to face with the patient discussing the diagnosis and importance of compliance with the treatment plan as well as documenting on the day of the visit. An electronic signature was used to authenticate this note.   -- Guido Johnson NP

## 2021-06-10 LAB
ALBUMIN SERPL-MCNC: 4.5 G/DL (ref 3.8–4.8)
ALBUMIN/CREAT UR: 5 MG/G CREAT (ref 0–29)
ALBUMIN/GLOB SERPL: 1.5 {RATIO} (ref 1.2–2.2)
ALP SERPL-CCNC: 76 IU/L (ref 48–121)
ALT SERPL-CCNC: 28 IU/L (ref 0–44)
AST SERPL-CCNC: 17 IU/L (ref 0–40)
BASOPHILS # BLD AUTO: 0.1 X10E3/UL (ref 0–0.2)
BASOPHILS NFR BLD AUTO: 1 %
BILIRUB SERPL-MCNC: 0.2 MG/DL (ref 0–1.2)
BUN SERPL-MCNC: 13 MG/DL (ref 8–27)
BUN/CREAT SERPL: 9 (ref 10–24)
CALCIUM SERPL-MCNC: 9.9 MG/DL (ref 8.6–10.2)
CHLORIDE SERPL-SCNC: 97 MMOL/L (ref 96–106)
CHOLEST SERPL-MCNC: 154 MG/DL (ref 100–199)
CO2 SERPL-SCNC: 22 MMOL/L (ref 20–29)
CREAT SERPL-MCNC: 1.37 MG/DL (ref 0.76–1.27)
CREAT UR-MCNC: 67.4 MG/DL
EOSINOPHIL # BLD AUTO: 0.2 X10E3/UL (ref 0–0.4)
EOSINOPHIL NFR BLD AUTO: 2 %
ERYTHROCYTE [DISTWIDTH] IN BLOOD BY AUTOMATED COUNT: 13 % (ref 11.6–15.4)
EST. AVERAGE GLUCOSE BLD GHB EST-MCNC: 255 MG/DL
GLOBULIN SER CALC-MCNC: 3 G/DL (ref 1.5–4.5)
GLUCOSE SERPL-MCNC: 413 MG/DL (ref 65–99)
HBA1C MFR BLD: 10.5 % (ref 4.8–5.6)
HCT VFR BLD AUTO: 46.7 % (ref 37.5–51)
HDLC SERPL-MCNC: 37 MG/DL
HGB BLD-MCNC: 15.4 G/DL (ref 13–17.7)
IMM GRANULOCYTES # BLD AUTO: 0 X10E3/UL (ref 0–0.1)
IMM GRANULOCYTES NFR BLD AUTO: 0 %
LDLC SERPL CALC-MCNC: 65 MG/DL (ref 0–99)
LYMPHOCYTES # BLD AUTO: 3.9 X10E3/UL (ref 0.7–3.1)
LYMPHOCYTES NFR BLD AUTO: 39 %
MCH RBC QN AUTO: 29.7 PG (ref 26.6–33)
MCHC RBC AUTO-ENTMCNC: 33 G/DL (ref 31.5–35.7)
MCV RBC AUTO: 90 FL (ref 79–97)
MICROALBUMIN UR-MCNC: 3.4 UG/ML
MONOCYTES # BLD AUTO: 0.9 X10E3/UL (ref 0.1–0.9)
MONOCYTES NFR BLD AUTO: 9 %
NEUTROPHILS # BLD AUTO: 4.8 X10E3/UL (ref 1.4–7)
NEUTROPHILS NFR BLD AUTO: 49 %
PLATELET # BLD AUTO: 283 X10E3/UL (ref 150–450)
POTASSIUM SERPL-SCNC: 4.9 MMOL/L (ref 3.5–5.2)
PROT SERPL-MCNC: 7.5 G/DL (ref 6–8.5)
RBC # BLD AUTO: 5.18 X10E6/UL (ref 4.14–5.8)
SODIUM SERPL-SCNC: 136 MMOL/L (ref 134–144)
TRIGL SERPL-MCNC: 334 MG/DL (ref 0–149)
TSH SERPL DL<=0.005 MIU/L-ACNC: 0.91 UIU/ML (ref 0.45–4.5)
VLDLC SERPL CALC-MCNC: 52 MG/DL (ref 5–40)
WBC # BLD AUTO: 10 X10E3/UL (ref 3.4–10.8)

## 2021-06-11 NOTE — PROGRESS NOTES
I saw the patient for follow-up in renew his medication since he has been out of diabetes medication. His A1c is 10.5. I attempted to call the patient to see if he has been checking his sugar and injecting and taking medication as directed however both numbers on file were not working. On his follow-up he is seeking for clearance to get dental procedure done. I am just giving you a heads up since you will be seeing the patient in July.

## 2021-09-09 ENCOUNTER — OFFICE VISIT (OUTPATIENT)
Dept: FAMILY MEDICINE CLINIC | Age: 66
End: 2021-09-09
Payer: MEDICARE

## 2021-09-09 VITALS
HEART RATE: 72 BPM | OXYGEN SATURATION: 98 % | DIASTOLIC BLOOD PRESSURE: 90 MMHG | BODY MASS INDEX: 24.88 KG/M2 | WEIGHT: 173.8 LBS | TEMPERATURE: 97.1 F | SYSTOLIC BLOOD PRESSURE: 144 MMHG | HEIGHT: 70 IN

## 2021-09-09 DIAGNOSIS — E78.2 MIXED HYPERLIPIDEMIA: ICD-10-CM

## 2021-09-09 DIAGNOSIS — E11.9 TYPE 2 DIABETES MELLITUS WITHOUT COMPLICATION, WITHOUT LONG-TERM CURRENT USE OF INSULIN (HCC): ICD-10-CM

## 2021-09-09 DIAGNOSIS — I10 ESSENTIAL HYPERTENSION: Primary | ICD-10-CM

## 2021-09-09 DIAGNOSIS — K04.7 DENTAL ABSCESS: ICD-10-CM

## 2021-09-09 DIAGNOSIS — R35.1 NOCTURIA: ICD-10-CM

## 2021-09-09 PROCEDURE — 99214 OFFICE O/P EST MOD 30 MIN: CPT | Performed by: FAMILY MEDICINE

## 2021-09-09 RX ORDER — PENICILLIN V POTASSIUM 500 MG/1
500 TABLET, FILM COATED ORAL 4 TIMES DAILY
Qty: 40 TABLET | Refills: 0 | Status: SHIPPED | OUTPATIENT
Start: 2021-09-09 | End: 2021-12-22 | Stop reason: ALTCHOICE

## 2021-09-09 RX ORDER — OXYBUTYNIN CHLORIDE 5 MG/1
TABLET ORAL
Qty: 60 TABLET | Refills: 1 | Status: SHIPPED | OUTPATIENT
Start: 2021-09-09 | End: 2021-09-10 | Stop reason: SDUPTHER

## 2021-09-09 NOTE — PATIENT INSTRUCTIONS
General Health and concerns:  HEART HEALTHY DIET:  A heart healthy diet is one that is low in cholesterol (less than 300 mg daily), fat (less than 80 g daily) . You should also minimize carbohydrates / sugars (less amounts of breads, pastas, potato and potato products and sugary foods/snacks, cookies, cakes, etc) . Try to eat whole wheat/multigrain breads and pastas and eat more vegetables. Cook with olive oil (or no oil) and grill, bake, broil or boil foods. Less red meat and more chicken , fish and lean cuts of beef (limited). 1443-5298 calories per day is sufficient 3452-9768 is acceptable for weight loss. EXERCISE:  You should do exercise 3-5 days per week (minimum) to include increasing your heart rate for 30 to 45 minutes. At least a pace of a brisk walk should do that. This build up your heart and lung endurance and muscles and helps many function of the body. OTHER:    IF your condition(s) do not improve, get worse and/or if any concerns arise, please call or come by the office. Routine Health maintenance: You need to get a yearly follow up/physical exam to review, discuss age and gender appropriate exams, labs, vaccines and screening tests. This includes cardiovascular health risk, cancer screens and other lonny related topics. Medications-Take all medications as directed. Please do not stop unless you talk to your doctor or health care provider first. Report any problems immediately. Referrals: if you have been given a referral, please call the office if you do not hear from provider in one week. You may make the appointment yourself. Please keep all appointments with specialists and ask them to send their notes, thoughts, recommendations to us , as your PCP. KEEP all upcoming appointments with our office UNLESS otherwise and specifically told not to.     CHECK your diagnosis/problem list for today and that orders and prescriptions are what we discussed as well as MAKE sure all information is accurate and has been discussed to your satisfaction. PLEASE make sure all your questions have been answered and feel free to call or come back should any concerns arise. Imaging/Labs:  Be sure to get these images in a timely manner. IF your test must be scheduled, let us know if you need help getting this done and if you do not hear from that provider in a week , call us or them. BE SURE to call the office if you do not hear regarding the results in one week after the test is performed Image or lab). It is our intention to inform you of the results ALWAYS, even if normal you should get a notification (Call, portal message). PLEASE isabel if you do not get the results. PLEASE follow all recommendations and call/come in /ask questions if you do not understand of if problems develop after or in between visits. Failure to comply with recommended health care advise could result in serious health consequences. Thank you for choosing our practice and please let us know how we can help you feel better and stay well!

## 2021-09-09 NOTE — PROGRESS NOTES
1. Have you been to the ER, urgent care clinic since your last visit? Hospitalized since your last visit? No    2. Have you seen or consulted any other health care providers outside of the [address] [place] [address] since your last visit? Include any pap smears or colon screening. No    Chief Complaint   Patient presents with    Swelling     c/o swelling in right side of face.        Visit Vitals  BP (!) 172/88 (BP 1 Location: Left upper arm, BP Patient Position: Sitting)   Pulse 72   Temp 97.1 °F (36.2 °C) (Temporal)   Ht 5' 10\" (1.778 m)   Wt 173 lb 12.8 oz (78.8 kg)   SpO2 98%   BMI 24.94 kg/m²

## 2021-09-09 NOTE — PROGRESS NOTES
IDENTIFYING INFORMATION:      [facility]. Cordell Meyers , 74 y.o., male  [address] [address]     Medical Record Number: [id number]          CHIEF COMPLAINT:     Chief Complaint   Patient presents with    Swelling     c/o swelling in right side of face. HISTORY OF PRESENT ILLNESS:    Dinah Rodriguez is a 74 y.o. male  has a past medical history of Cancer ([address] Colloid cyst of brain ([address] (6/2011), Constipation, Diabetes ([address] Diabetes ([address] Erectile dysfunction, GERD (gastroesophageal reflux disease), Hyperlipidemia, Hypertension, and Migraines. .  he comes in for for several complaints. Wife is present and tells  He is having issues regarding nocturia, gets up about once an hour, 4-5 times or more some urgency, even during day. Last PSA in November was normal.  Has dental pain. Right upper tooth. No fevers, chills, sweats. Had prostate cancer  Many years ago and prostate was removed and had radiation. PAST MEDICAL HISTORY:     Past Medical History:   Diagnosis Date    Cancer [facility])     prostate    Colloid cyst of brain ([facility] [address] 6/2011    S/P excision cyst at [address]    Constipation     Diabetes ([facility] [address]     IDDM    Diabetes ([facility] [address]     Erectile dysfunction     GERD (gastroesophageal reflux disease)     Hyperlipidemia     Hypertension     Migraines        MEDICATIONS:     Current Outpatient Medications on File Prior to Visit   Medication Sig Dispense Refill    ibuprofen (MOTRIN) 600 mg tablet Take 1 Tablet by mouth every six (6) hours as needed for Pain. 90 Tablet 5    penicillin v potassium (VEETID) 500 mg tablet 4 tabls orally one hour prior to dental visit 4 Tab 2    lisinopriL (PRINIVIL, ZESTRIL) 40 mg tablet TAKE 1 TABLET BY MOUTH EVERY DAY 90 Tab 1    atorvastatin (LIPITOR) 20 mg tablet Take 1 Tab by mouth daily. 30 Tab 5    SITagliptin-metFORMIN (Janumet XR)  mg TM24 Take 1 Tab by mouth two (2) times daily (with meals).  To use with free trial offer and savings card 60 Tab 6    aspirin (Nino Chewable Aspirin) 81 mg chewable tablet Take 81 mg by mouth daily.  sildenafil citrate (VIAGRA) 100 mg tablet Take 100 mg by mouth as needed for Erectile Dysfunction.  naproxen sodium (Aleve) 220 mg tablet Take 220 mg by mouth two (2) times daily (with meals).  insulin glargine U-300 conc (Toujeo Max U-300 SoloStar) 300 unit/mL (3 mL) inpn 70 units subcutaneously every day 15 mL 5    metoprolol succinate (TOPROL-XL) 50 mg XL tablet Take 50 mg by mouth daily. 5    glucose blood VI test strips (ONETOUCH VERIO) strip Test 3 times daily. Dx code 250.00 100 Strip 6    Lancets (ONE TOUCH DELICA) misc Test 3 times daily. Dx code 250.00 100 Each 6    docusate sodium (COLACE) 100 mg capsule Take 1 Cap by mouth two (2) times a day. 60 Cap 2    [DISCONTINUED] penicillin v potassium (VEETID) 500 mg tablet Take 1 Tab by mouth four (4) times daily. 40 Tab 0    AFREZZA 4 unit CtDv INHALE 4 UNITS BY MOUTH BEFORE BREAKFAST, LUNCH AND DINNER. MAX OF 12 UNITS (Patient not taking: Reported on 6/8/2021) 90 g 6     No current facility-administered medications on file prior to visit.        ALLERGIES:    No Known Allergies      SOCIAL HISTORY:     Social History     Tobacco Use    Smoking status: Current Every Day Smoker     Packs/day: 0.50    Smokeless tobacco: Never Used    Tobacco comment: Started smoking at age 11   Vaping Use    Vaping Use: Never used   Substance Use Topics    Alcohol use: Not Currently    Drug use: Never       SURGICAL HISTORY:    Past Surgical History:   Procedure Laterality Date    HX GI  2012    HX HEENT  2006    exc mass right neck    HX OTHER SURGICAL  2012    brain    HX UROLOGICAL      prostate bx    NEUROLOGICAL PROCEDURE UNLISTED  6-2011    removal cranial colloid cyst [place] [address] [facility]:    Family History   Problem Relation Age of Onset    Diabetes Mother     Heart Disease Mother     Stroke Mother    Treviño Saliva Heart Disease Father     Stroke Father     Hypertension Brother     Elevated Lipids Brother          REVIEW OF SYSTEMS:    I personally collected this information from all available source present (patient/others in room and records available) -[email or web address]    Review of Systems   Constitutional: Negative for chills and fever. HENT: Negative for congestion, sinus pain and sore throat. Having dental pain right upper. Eyes: Negative for blurred vision and double vision. Respiratory: Negative for cough, shortness of breath and wheezing. Cardiovascular: Negative for chest pain and palpitations. Gastrointestinal: Negative for abdominal pain, constipation, diarrhea, nausea and vomiting. Genitourinary: Positive for frequency (Night mostly. ). Negative for dysuria and urgency. Musculoskeletal: Negative for joint pain. Neurological: Positive for headaches. Negative for dizziness, tingling and sensory change. PHYSICAL EXAMINATION:    Vital Signs:    Visit Vitals  BP (!) 144/90 (BP 1 Location: Left upper arm, BP Patient Position: Sitting)   Pulse 72   Temp 97.1 °F (36.2 °C) (Temporal)   Ht 5' 10\" (1.778 m)   Wt 173 lb 12.8 oz (78.8 kg)   SpO2 98%   BMI 24.94 kg/m²         Wt Readings from Last 3 Encounters:   09/09/21 173 lb 12.8 oz (78.8 kg)   06/08/21 185 lb (83.9 kg)   04/15/21 192 lb 6.4 oz (87.3 kg)     BP Readings from Last 3 Encounters:   09/09/21 (!) 144/90   06/08/21 (!) 146/79   04/15/21 (!) 145/82         Physical Exam  Constitutional:       General: He is in acute distress (Slightly uncomfortable). Appearance: He is not ill-appearing or toxic-appearing. HENT:      Right Ear: Tympanic membrane, ear canal and external ear normal.      Left Ear: Tympanic membrane, ear canal and external ear normal.      Nose: Congestion (Nares are slightly boggy minimal inflammation.) present. No rhinorrhea.       Mouth/Throat:      Pharynx: No oropharyngeal exudate or posterior oropharyngeal erythema. Comments: Right upper canine region there is some decayed teeth with some gingival erythema and swelling. No exudate. Eyes:      General: No scleral icterus. Extraocular Movements: Extraocular movements intact. Conjunctiva/sclera: Conjunctivae normal.   Cardiovascular:      Rate and Rhythm: Normal rate and regular rhythm. Heart sounds: No murmur heard. No friction rub. No gallop. Pulmonary:      Effort: Pulmonary effort is normal.      Breath sounds: Normal breath sounds. No wheezing, rhonchi or rales. Musculoskeletal:         General: No deformity. Cervical back: No tenderness. Right lower leg: No edema. Left lower leg: No edema. Lymphadenopathy:      Cervical: No cervical adenopathy. Skin:     Coloration: Skin is not jaundiced. Findings: No erythema or rash. Neurological:      General: No focal deficit present. Mental Status: He is alert. Mental status is at baseline. Psychiatric:         Mood and Affect: Mood normal.         Behavior: Behavior normal.         Thought Content: Thought content normal.         Judgment: Judgment normal.           ASSESSMENT/PLAN:      Discussion (regarding today's visit with Ashlyn Dominga);  Wife present and helps give history.  Definitely need to give an antibiotic needs dental visit   With his nocturia could be some bladder issues as he has had his prostate removed we will put him on oxybutynin and go from there.  We will check his lab again follow-up and treat as indicated    ICD-10-CM ICD-9-CM    1. Essential hypertension  I10 401.9    2. Type 2 diabetes mellitus without complication, without long-term current use of insulin (HCC)  E11.9 250.00 HEMOGLOBIN A1C WITH EAG      METABOLIC PANEL, COMPREHENSIVE      LIPID PANEL   3. Mixed hyperlipidemia  E78.2 272.2    4. Dental abscess  K04.7 522. 5 penicillin v potassium (VEETID) 500 mg tablet      REFERRAL TO DENTISTRY   5.  Nocturia  R35.1 788.43 oxybutynin (DITROPAN) 5 mg tablet        WE reviewed medications, treatment, testing such as labs, imagine, referrals and when to call regarding results and appointments.  Reminded patient to keep any and all appointments with specialists, labs, imaging.  Reminded patient to make sure we get copies of any specialists care, labs and imaging.  Reminded patient to call of come by the office if there are any concerns, questions , comments or problems.  The patient verbalized understanding of the care plan and all questions were answered to the patient's satisfaction prior to leaving the office.  The patient was told that failure to comply with recommended testing could result in abnormal health consequences.  The patient was instructed to have yearly routine health maintenance including but not limited to age appropriate vaccines, testing, screening exams.  ALL questions were answered to his satisfaction before leaving the office. The patient actively participated in medical decision making. FOLLOW UP:     Patient knows to keep any and all future visits scheduled unless told otherwise. Patient knows to call, come back if any concerns, questions, comments or problems arise. Vinnie Dong,     This visit was reviewed and signed electronically. It was been completed with voice recognition software and hand typing. It may have syntax and spelling errors despite editing.

## 2021-09-10 DIAGNOSIS — R35.1 NOCTURIA: ICD-10-CM

## 2021-09-10 LAB
ALBUMIN SERPL-MCNC: 4.2 G/DL (ref 3.8–4.8)
ALBUMIN/GLOB SERPL: 1.7 {RATIO} (ref 1.2–2.2)
ALP SERPL-CCNC: 67 IU/L (ref 48–121)
ALT SERPL-CCNC: 24 IU/L (ref 0–44)
AST SERPL-CCNC: 18 IU/L (ref 0–40)
BILIRUB SERPL-MCNC: 0.3 MG/DL (ref 0–1.2)
BUN SERPL-MCNC: 12 MG/DL (ref 8–27)
BUN/CREAT SERPL: 10 (ref 10–24)
CALCIUM SERPL-MCNC: 9.7 MG/DL (ref 8.6–10.2)
CHLORIDE SERPL-SCNC: 99 MMOL/L (ref 96–106)
CHOLEST SERPL-MCNC: 143 MG/DL (ref 100–199)
CO2 SERPL-SCNC: 25 MMOL/L (ref 20–29)
CREAT SERPL-MCNC: 1.15 MG/DL (ref 0.76–1.27)
EST. AVERAGE GLUCOSE BLD GHB EST-MCNC: 309 MG/DL
GLOBULIN SER CALC-MCNC: 2.5 G/DL (ref 1.5–4.5)
GLUCOSE SERPL-MCNC: 359 MG/DL (ref 65–99)
HBA1C MFR BLD: 12.4 % (ref 4.8–5.6)
HDLC SERPL-MCNC: 33 MG/DL
LDLC SERPL CALC-MCNC: 81 MG/DL (ref 0–99)
POTASSIUM SERPL-SCNC: 5.9 MMOL/L (ref 3.5–5.2)
PROT SERPL-MCNC: 6.7 G/DL (ref 6–8.5)
SODIUM SERPL-SCNC: 137 MMOL/L (ref 134–144)
TRIGL SERPL-MCNC: 166 MG/DL (ref 0–149)
VLDLC SERPL CALC-MCNC: 29 MG/DL (ref 5–40)

## 2021-09-10 RX ORDER — OXYBUTYNIN CHLORIDE 5 MG/1
TABLET ORAL
Qty: 180 TABLET | Refills: 1 | Status: SHIPPED | OUTPATIENT
Start: 2021-09-10 | End: 2022-02-15 | Stop reason: ALTCHOICE

## 2021-09-14 DIAGNOSIS — E11.65 UNCONTROLLED TYPE 2 DIABETES MELLITUS WITH HYPERGLYCEMIA (HCC): ICD-10-CM

## 2021-09-14 DIAGNOSIS — E87.5 HYPERKALEMIA: Primary | ICD-10-CM

## 2021-09-14 NOTE — PROGRESS NOTES
Identifying Data:  74 y.o. , All [employer] , male     HISTORICAL DATA (REVIEWED TODAY):  ALLERGIES-    No Known Allergies    MEDICATION AS OF LAST RECONCILIATION (NOT INCLUDING CHANGES MADE TODAY):    Current Outpatient Medications on File Prior to Visit   Medication Sig Dispense Refill    oxybutynin (DITROPAN) 5 mg tablet One tablet orally  Tablet 1    penicillin v potassium (VEETID) 500 mg tablet Take 1 Tablet by mouth four (4) times daily. 40 Tablet 0    ibuprofen (MOTRIN) 600 mg tablet Take 1 Tablet by mouth every six (6) hours as needed for Pain. 90 Tablet 5    penicillin v potassium (VEETID) 500 mg tablet 4 tabls orally one hour prior to dental visit 4 Tab 2    lisinopriL (PRINIVIL, ZESTRIL) 40 mg tablet TAKE 1 TABLET BY MOUTH EVERY DAY 90 Tab 1    atorvastatin (LIPITOR) 20 mg tablet Take 1 Tab by mouth daily. 30 Tab 5    SITagliptin-metFORMIN (Janumet XR)  mg TM24 Take 1 Tab by mouth two (2) times daily (with meals). To use with free trial offer and savings card 60 Tab 6    aspirin (Nino Chewable Aspirin) 81 mg chewable tablet Take 81 mg by mouth daily.  sildenafil citrate (VIAGRA) 100 mg tablet Take 100 mg by mouth as needed for Erectile Dysfunction.  naproxen sodium (Aleve) 220 mg tablet Take 220 mg by mouth two (2) times daily (with meals).  insulin glargine U-300 conc (Toujeo Max U-300 SoloStar) 300 unit/mL (3 mL) inpn 70 units subcutaneously every day 15 mL 5    [DISCONTINUED] AFREZZA 4 unit CtDv INHALE 4 UNITS BY MOUTH BEFORE BREAKFAST, LUNCH AND DINNER. MAX OF 12 UNITS (Patient not taking: Reported on 6/8/2021) 90 g 6    metoprolol succinate (TOPROL-XL) 50 mg XL tablet Take 50 mg by mouth daily. 5    glucose blood VI test strips (ONETOUCH VERIO) strip Test 3 times daily. Dx code 250.00 100 Strip 6    Lancets (ONE TOUCH DELICA) misc Test 3 times daily.  Dx code 250.00 100 Each 6    docusate sodium (COLACE) 100 mg capsule Take 1 Cap by mouth two (2) times a day. 60 Cap 2     No current facility-administered medications on file prior to visit. PAST MEDICAL HISTORY:    Patient Active Problem List   Diagnosis Code    Hypertension I10    Hyperlipidemia E78.5    Erectile dysfunction N52.9    Diabetes ([facility] E11.9       ASSESSMENT AND PLAN:      ICD-10-CM ICD-9-CM    1. Hyperkalemia  C58.0 396.6 METABOLIC PANEL, BASIC   2.  Uncontrolled type 2 diabetes mellitus with hyperglycemia ([facility]  E11.65 250.02 REFERRAL TO ENDOCRINOLOGY             aJmal Velasco DO

## 2021-09-14 NOTE — PROGRESS NOTES
Hemoglobin A1c is 12.4% this is tremendously high I would see the neurologist this is possible. Function is normal.  Liver function is normal.  Potassium is high we need to repeat that. Could be hemolyzed. Cholesterol is pretty good actually.

## 2021-10-04 DIAGNOSIS — E87.5 HYPERKALEMIA: Primary | ICD-10-CM

## 2021-10-22 ENCOUNTER — TELEPHONE (OUTPATIENT)
Dept: FAMILY MEDICINE CLINIC | Age: 66
End: 2021-10-22

## 2021-10-22 NOTE — TELEPHONE ENCOUNTER
I attempted to reach patient by phone as a referral exists from 9/14/2021 to Dr. Mateus Mi, number listed for call back in call center message has a full voicemail, home and mobile numbers in patient's chart are no longer in service. Referral mailed to patient--awaiting patient contact.

## 2021-10-22 NOTE — TELEPHONE ENCOUNTER
----- Message from Joseph Butler sent at 10/22/2021  4:10 PM EDT -----  Subject: Referral Request    QUESTIONS   Reason for referral request? Patient needs a referral to Endocrinology. Patient significant other stated His A1c results were high and that is the   reason he needs the referral.   Has the physician seen you for this condition before? No   Preferred Specialist (if applicable)? Do you already have an appointment scheduled? No  Additional Information for Provider? Patient is established with Dr. Kassy Simmons. Please confirm with patient.   ---------------------------------------------------------------------------  --------------  CALL BACK INFO  What is the best way for the office to contact you? OK to leave message on   voicemail  Preferred Call Back Phone Number?  [phone]

## 2021-11-07 DIAGNOSIS — E11.9 CONTROLLED TYPE 2 DIABETES MELLITUS WITHOUT COMPLICATION, WITHOUT LONG-TERM CURRENT USE OF INSULIN (HCC): ICD-10-CM

## 2021-11-08 RX ORDER — ATORVASTATIN CALCIUM 20 MG/1
TABLET, FILM COATED ORAL
Qty: 30 TABLET | Refills: 5 | Status: SHIPPED | OUTPATIENT
Start: 2021-11-08 | End: 2022-03-22 | Stop reason: SDUPTHER

## 2021-12-22 ENCOUNTER — OFFICE VISIT (OUTPATIENT)
Dept: FAMILY MEDICINE CLINIC | Age: 66
End: 2021-12-22
Payer: MEDICARE

## 2021-12-22 VITALS
WEIGHT: 177.2 LBS | BODY MASS INDEX: 25.43 KG/M2 | HEART RATE: 71 BPM | OXYGEN SATURATION: 97 % | SYSTOLIC BLOOD PRESSURE: 128 MMHG | RESPIRATION RATE: 16 BRPM | TEMPERATURE: 98.6 F | DIASTOLIC BLOOD PRESSURE: 86 MMHG

## 2021-12-22 DIAGNOSIS — R68.81 EARLY SATIETY: ICD-10-CM

## 2021-12-22 DIAGNOSIS — Z79.4 TYPE 2 DIABETES MELLITUS WITH HYPERGLYCEMIA, WITH LONG-TERM CURRENT USE OF INSULIN (HCC): ICD-10-CM

## 2021-12-22 DIAGNOSIS — Z29.8 SBE (SUBACUTE BACTERIAL ENDOCARDITIS) PROPHYLAXIS CANDIDATE: ICD-10-CM

## 2021-12-22 DIAGNOSIS — E11.65 TYPE 2 DIABETES MELLITUS WITH HYPERGLYCEMIA, WITH LONG-TERM CURRENT USE OF INSULIN (HCC): ICD-10-CM

## 2021-12-22 DIAGNOSIS — E78.2 MIXED HYPERLIPIDEMIA: ICD-10-CM

## 2021-12-22 DIAGNOSIS — E11.9 CONTROLLED TYPE 2 DIABETES MELLITUS WITHOUT COMPLICATION, WITHOUT LONG-TERM CURRENT USE OF INSULIN (HCC): Primary | ICD-10-CM

## 2021-12-22 DIAGNOSIS — R10.84 GENERALIZED ABDOMINAL PAIN: ICD-10-CM

## 2021-12-22 DIAGNOSIS — R35.1 NOCTURIA: ICD-10-CM

## 2021-12-22 DIAGNOSIS — I10 ESSENTIAL HYPERTENSION: ICD-10-CM

## 2021-12-22 PROCEDURE — 99213 OFFICE O/P EST LOW 20 MIN: CPT | Performed by: FAMILY MEDICINE

## 2021-12-22 RX ORDER — INSULIN GLARGINE 100 [IU]/ML
INJECTION, SOLUTION SUBCUTANEOUS
Qty: 5 PEN | Refills: 5 | Status: SHIPPED | OUTPATIENT
Start: 2021-12-22 | End: 2022-02-15 | Stop reason: ALTCHOICE

## 2021-12-22 RX ORDER — LANCETS
EACH MISCELLANEOUS
Qty: 300 EACH | Refills: 6 | Status: SHIPPED | OUTPATIENT
Start: 2021-12-22

## 2021-12-22 RX ORDER — INSULIN PUMP SYRINGE, 3 ML
EACH MISCELLANEOUS
Qty: 1 KIT | Refills: 0 | Status: SHIPPED | OUTPATIENT
Start: 2021-12-22

## 2021-12-22 NOTE — PROGRESS NOTES
IDENTIFYING INFORMATION:      Berta Glaser. Kaye Rojas , 74 y.o., male  [address] [address]     Medical Record Number: [id number]    E and M CODING:  Established      Patient Active Problem List   Diagnosis Code    Hypertension I10    Hyperlipidemia E78.5    Erectile dysfunction N52.9    Diabetes ([facility] E11.9           CHIEF COMPLAINT:   Chief Complaint   Patient presents with    Physical    Sleep Problem     Difficulty falling asleep and staying asleep     Decreased Appetite    Constipation         HISTORY OF PRESENT ILLNESS:    Claudis Dandy is a 74 y.o. male . he comes in for Follow up and says he cannot eat or sleep. Says he stays full all the time. Does NOT feel depressed but not been able to sleep for a while. Has nocturia and given oxybutynin and says they are not helping that much. Had a dental abscess and teeth are better. Was given penicillin. A1c was 12.4% and did not get phone calls but we did send letter and finally made an appointment with endocrinology for next month. Has prostate cancer and status post prostatectomy. PAST MEDICAL HISTORY:     Past Medical History:   Diagnosis Date    Cancer [facility])     prostate    Colloid cyst of brain ([facility] 6/2011    S/P excision cyst at [address]    Constipation     Diabetes ([facility]     IDDM    [facility]     Erectile dysfunction     GERD (gastroesophageal reflux disease)     Hyperlipidemia     Hypertension     Migraines        MEDICATIONS:     Current Outpatient Medications on File Prior to Visit   Medication Sig Dispense Refill    atorvastatin (LIPITOR) 20 mg tablet TAKE 1 TABLET BY MOUTH DAILY 30 Tablet 5    oxybutynin (DITROPAN) 5 mg tablet One tablet orally  Tablet 1    ibuprofen (MOTRIN) 600 mg tablet Take 1 Tablet by mouth every six (6) hours as needed for Pain.  90 Tablet 5    lisinopriL (PRINIVIL, ZESTRIL) 40 mg tablet TAKE 1 TABLET BY MOUTH EVERY DAY 90 Tab 1    SITagliptin-metFORMIN (Janumet XR)  mg TM24 Take 1 Tab by mouth two (2) times daily (with meals). To use with free trial offer and savings card 60 Tab 6    aspirin (Nino Chewable Aspirin) 81 mg chewable tablet Take 81 mg by mouth daily.  insulin glargine U-300 conc (Toujeo Max U-300 SoloStar) 300 unit/mL (3 mL) inpn 70 units subcutaneously every day 15 mL 5    glucose blood VI test strips (ONETOUCH VERIO) strip Test 3 times daily. Dx code 250.00 100 Strip 6    Lancets (ONE TOUCH DELICA) misc Test 3 times daily. Dx code 250.00 100 Each 6    penicillin v potassium (VEETID) 500 mg tablet Take 1 Tablet by mouth four (4) times daily. (Patient not taking: Reported on 12/22/2021) 40 Tablet 0    penicillin v potassium (VEETID) 500 mg tablet 4 tabls orally one hour prior to dental visit (Patient not taking: Reported on 12/22/2021) 4 Tab 2    sildenafil citrate (VIAGRA) 100 mg tablet Take 100 mg by mouth as needed for Erectile Dysfunction. (Patient not taking: Reported on 12/22/2021)      naproxen sodium (Aleve) 220 mg tablet Take 220 mg by mouth two (2) times daily (with meals). (Patient not taking: Reported on 12/22/2021)      metoprolol succinate (TOPROL-XL) 50 mg XL tablet Take 50 mg by mouth daily. (Patient not taking: Reported on 12/22/2021)  5    docusate sodium (COLACE) 100 mg capsule Take 1 Cap by mouth two (2) times a day. (Patient not taking: Reported on 12/22/2021) 60 Cap 2     No current facility-administered medications on file prior to visit.        ALLERGIES:    No Known Allergies      SOCIAL HISTORY:     Social History     Socioeconomic History    Marital status: SINGLE   Tobacco Use    Smoking status: Current Every Day Smoker     Packs/day: 0.50    Smokeless tobacco: Never Used    Tobacco comment: Started smoking at age 11   Vaping Use    Vaping Use: Never used   Substance and Sexual Activity    Alcohol use: Not Currently    Drug use: Never    Sexual activity: Yes     Partners: Female   Social History Narrative    ** Merged History Encounter **              SURGICAL HISTORY:    Past Surgical History:   Procedure Laterality Date    HX GI  2012    HX HEENT  2006    exc mass right neck    HX OTHER SURGICAL  2012    brain    HX UROLOGICAL      prostate bx    NEUROLOGICAL PROCEDURE UNLISTED  6-2011    removal cranial colloid cyst at [address] [facility]:    Family History   Problem Relation Age of Onset    Diabetes Mother     Heart Disease Mother     Stroke Mother     Heart Disease Father     Stroke Father     Hypertension Brother     Elevated Lipids Brother          REVIEW OF SYSTEMS:    I personally collected this information from all available source present (patient/others in room and records available) -[email or web address]  Review of Systems   Constitutional: Positive for malaise/fatigue. Negative for chills, diaphoresis and fever. HENT: Negative for congestion, sinus pain, sore throat and tinnitus. Eyes: Negative for blurred vision, double vision and photophobia. Respiratory: Negative for cough, shortness of breath and wheezing. Cardiovascular: Negative for chest pain, palpitations, orthopnea, leg swelling and PND. Gastrointestinal: Positive for constipation. Negative for abdominal pain, blood in stool, diarrhea, melena, nausea and vomiting. Genitourinary: Positive for frequency (night time. ). Negative for dysuria and urgency. Musculoskeletal: Negative for back pain, joint pain, myalgias and neck pain. Neurological: Positive for tingling and sensory change. Negative for dizziness and headaches. Tingling and numbness in feet sometimes. Psychiatric/Behavioral: Negative for depression. The patient has insomnia. The patient is not nervous/anxious.           PHYSICAL EXAMINATION:    Vital Signs:    Visit Vitals  /86 (BP 1 Location: Left upper arm, BP Patient Position: Sitting, BP Cuff Size: Adult)   Pulse 71   Temp 98.6 °F (37 °C) (Skin)   Resp 16   Wt 177 lb 3.2 oz (80.4 kg)   SpO2 97%   BMI 25.43 kg/m²         Wt Readings from Last 3 Encounters:   12/22/21 177 lb 3.2 oz (80.4 kg)   09/09/21 173 lb 12.8 oz (78.8 kg)   06/08/21 185 lb (83.9 kg)     BP Readings from Last 3 Encounters:   12/22/21 128/86   09/09/21 (!) 144/90   06/08/21 (!) 146/79         Physical Exam  Nursing note reviewed. Constitutional:       General: He is not in acute distress. Appearance: Normal appearance. He is not ill-appearing or toxic-appearing. HENT:      Right Ear: Tympanic membrane, ear canal and external ear normal.      Left Ear: Tympanic membrane, ear canal and external ear normal.      Nose: No congestion or rhinorrhea. Mouth/Throat:      Pharynx: No oropharyngeal exudate or posterior oropharyngeal erythema. Eyes:      General: No scleral icterus. Extraocular Movements: Extraocular movements intact. Conjunctiva/sclera: Conjunctivae normal.      Pupils: Pupils are equal, round, and reactive to light. Neck:      Vascular: No carotid bruit. Cardiovascular:      Rate and Rhythm: Normal rate and regular rhythm. Heart sounds: No murmur heard. No friction rub. No gallop. Pulmonary:      Effort: Pulmonary effort is normal.      Breath sounds: Normal breath sounds. No wheezing, rhonchi or rales. Abdominal:      General: Bowel sounds are normal. There is no distension. Palpations: Abdomen is soft. There is no mass. Tenderness: There is no abdominal tenderness. Musculoskeletal:         General: No swelling, tenderness or deformity. Cervical back: No rigidity. No muscular tenderness. Right lower leg: No edema. Left lower leg: No edema. Lymphadenopathy:      Cervical: No cervical adenopathy. Skin:     Coloration: Skin is not jaundiced. Findings: No erythema or rash. Neurological:      General: No focal deficit present. Mental Status: He is alert and oriented to person, place, and time. Mental status is at baseline. Gait: Gait normal.   Psychiatric:         Mood and Affect: Mood normal.         Behavior: Behavior normal.         Thought Content: Thought content normal.         Judgment: Judgment normal.           ASSESSMENT/PLAN:       Not sure if this patient issue is one causing the poor sleep and to related to bladder versus history of prostate cancer and prostatectomy.  And will think we should increase oxybutynin until he sees a urologist   Certainly elevated blood sugars will cause urine frequency and would think it may cause more urine production at night when he lies down   Last set of labs reviewed.  I do think that he needs an upper GI due to early satiety   I think his fatigue is likely from hyperglycemia and he has an upcoming endocrinology appointment in the next month   I did increase his Lantus to 80 units daily   I did send in diabetic testing supplies as he needs some new ones   Follow-up and treat as indicated. ICD-10-CM ICD-9-CM    1. Controlled type 2 diabetes mellitus without complication, without long-term current use of insulin ([facility])  E11.9 250.00 XR UPPER GI SERIES W KUB      lancets misc      insulin glargine (LANTUS,BASAGLAR) 100 unit/mL (3 mL) inpn      Blood-Glucose Meter monitoring kit      glucose blood VI test strips (ASCENSIA AUTODISC VI, ONE TOUCH ULTRA TEST VI) strip   2. Type 2 diabetes mellitus with hyperglycemia, with long-term current use of insulin ([facility])  E11.65 250.00     Z79.4 790.29      V58.67    3. SBE (subacute bacterial endocarditis) prophylaxis candidate  Z29.8 V07.8    4. Essential hypertension  I10 401.9    5. Mixed hyperlipidemia  E78.2 272.2    6. Nocturia  R35.1 788.43 REFERRAL TO UROLOGY   7. Early satiety  R68.81 780.94 XR UPPER GI SERIES W KUB   8.  Generalized abdominal pain  R10.84 789.07 XR UPPER GI SERIES W KUB     Discussion (regarding today's visit with Wilber Sheffield);   WE gave the patient a review of medications, treatment, testing such as labs, imagine, referrals and when to call regarding results and appointments.  Reminded patient to keep any and all appointments with specialists, labs, imaging.  Reminded patient to make sure we get copies of any specialists care, labs and imaging.  Reminded patient to call of come by the office if there are any concerns, questions , comments or problems.  The patient verbalized understanding of the care plan and all questions were answered to the patient's satisfaction prior to leaving the office.  Failure to comply with recommended testing could result in abnormal health consequences.  The patient was instructed to have yearly routine health maintenance including but not limited to age appropriate vaccines, testing, screening exams via the AVS.      The patient actively participated in medical decision making. This date I spent  20 minutes reviewing chart, previous notes, tests and interviewing and examining patients answering questions providing follow up as well as ordering tests. FOLLOW UP:     Patient knows to keep any and all future visits scheduled unless told otherwise. Patient knows to call, come back if any concerns, questions, comments or problems arise. Follow-up and Dispositions    · Return in about 3 months (around 3/22/2022) for Follow up, will need labs if not done at ENDO. Signed By: Mo Leong DO     December 22, 2021     TIME: 5:20 pm    This visit was reviewed and signed electronically. It was been completed with voice recognition software and hand typing. It may have syntax and spelling errors despite editing.

## 2021-12-22 NOTE — PROGRESS NOTES
Chief Complaint   Patient presents with    Physical    Sleep Problem     Difficulty falling asleep and staying asleep     Decreased Appetite    Constipation     1. Have you been to the ER, urgent care clinic since your last visit? Hospitalized since your last visit? No    2. Have you seen or consulted any other health care providers outside of the [address] [place] [address] since your last visit? Include any pap smears or colon screening.  No  Visit Vitals  /86 (BP 1 Location: Left upper arm, BP Patient Position: Sitting, BP Cuff Size: Adult)   Pulse 71   Temp 98.6 °F (37 °C) (Skin)   Resp 16   Wt 177 lb 3.2 oz (80.4 kg)   SpO2 97%   BMI 25.43 kg/m²

## 2021-12-22 NOTE — PATIENT INSTRUCTIONS
General Health and concerns:  HEART HEALTHY DIET:  A heart healthy diet is one that is low in cholesterol (less than 300 mg daily), fat (less than 80 g daily) . You should also minimize carbohydrates / sugars (less amounts of breads, pastas, potato and potato products and sugary foods/snacks, cookies, cakes, etc) . Try to eat whole wheat/multigrain breads and pastas and eat more vegetables. Cook with olive oil (or no oil) and grill, bake, broil or boil foods. Less red meat and more chicken , fish and lean cuts of beef (limited). 9126-6912 calories per day is sufficient 3158-0874 is acceptable for weight loss. EXERCISE:  You should do exercise 3-5 days per week (minimum) to include increasing your heart rate for 30 to 45 minutes. At least a pace of a brisk walk should do that. This build up your heart and lung endurance and muscles and helps many function of the body. OTHER:    IF your condition(s) do not improve, get worse and/or if any concerns arise, please call or come by the office. Routine Health maintenance: You need to get a yearly follow up/physical exam to review, discuss age and gender appropriate exams, labs, vaccines and screening tests. This includes cardiovascular health risk, cancer screens and other lonny related topics. Medications-Take all medications as directed. Please do not stop unless you talk to your doctor or health care provider first. Report any problems immediately. PLEASE CHECK THE LIST FROM TODAY's VISIT and make SURE IT IS ACCURATE-Please bring any issues to our concern IMMEDIATELY!! Referrals: if you have been given a referral, please call the office if you do not hear from provider in one week. You may make the appointment yourself. Please keep all appointments with specialists and ask them to send their notes, thoughts, recommendations to us , as your PCP.     KEEP all upcoming appointments with our office UNLESS otherwise and specifically told not to. CHECK your diagnosis/problem list for today and that orders and prescriptions are what we discussed as well as MAKE sure all information is accurate and has been discussed to your satisfaction. PLEASE make sure all your questions have been answered and feel free to call or come back should any concerns arise. Imaging/Labs:  Be sure to get these images in a timely manner. IF your test must be scheduled, let us know if you need help getting this done and if you do not hear from that provider in a week , call us or them. BE SURE to call the office if you do not hear regarding the results in one week after the test is performed Image or lab). It is our intention to inform you of the results ALWAYS, even if normal you should get a notification (Call, portal message). PLEASE isabel if you do not get the results. PLEASE follow all recommendations and call/come in /ask questions if you do not understand of if problems develop after or in between visits. Failure to comply with recommended health care advise could result in serious health consequences. Thank you for choosing our practice and please let us know how we can help you feel better and stay well!

## 2022-01-12 ENCOUNTER — HOSPITAL ENCOUNTER (OUTPATIENT)
Dept: GENERAL RADIOLOGY | Age: 67
Discharge: HOME | End: 2022-01-12
Attending: FAMILY MEDICINE
Payer: MEDICARE

## 2022-01-12 DIAGNOSIS — E11.9 CONTROLLED TYPE 2 DIABETES MELLITUS WITHOUT COMPLICATION, WITHOUT LONG-TERM CURRENT USE OF INSULIN (HCC): ICD-10-CM

## 2022-01-12 DIAGNOSIS — R68.81 EARLY SATIETY: ICD-10-CM

## 2022-01-12 DIAGNOSIS — R10.84 GENERALIZED ABDOMINAL PAIN: ICD-10-CM

## 2022-01-12 PROCEDURE — 74240 X-RAY XM UPR GI TRC 1CNTRST: CPT

## 2022-01-12 PROCEDURE — 74011000250 HC RX REV CODE- 250: Performed by: FAMILY MEDICINE

## 2022-01-12 RX ADMIN — BARIUM SULFATE 532.5 ML: 0.6 SUSPENSION ORAL at 12:00

## 2022-01-12 RX ADMIN — ANTACID/ANTIFLATULENT 4 G: 380; 550; 10; 10 GRANULE, EFFERVESCENT ORAL at 12:00

## 2022-02-09 ENCOUNTER — OFFICE VISIT (OUTPATIENT)
Dept: ENDOCRINOLOGY | Age: 67
End: 2022-02-09
Payer: MEDICARE

## 2022-02-09 VITALS
DIASTOLIC BLOOD PRESSURE: 99 MMHG | HEIGHT: 70 IN | BODY MASS INDEX: 25.41 KG/M2 | TEMPERATURE: 97.5 F | HEART RATE: 76 BPM | OXYGEN SATURATION: 98 % | WEIGHT: 177.5 LBS | SYSTOLIC BLOOD PRESSURE: 159 MMHG

## 2022-02-09 DIAGNOSIS — E11.65 UNCONTROLLED TYPE 2 DIABETES MELLITUS WITH HYPERGLYCEMIA (HCC): Primary | ICD-10-CM

## 2022-02-09 DIAGNOSIS — E78.2 MIXED HYPERLIPIDEMIA: ICD-10-CM

## 2022-02-09 DIAGNOSIS — I10 BENIGN ESSENTIAL HTN: ICD-10-CM

## 2022-02-09 LAB
GLUCOSE POC: 169 MG/DL
HBA1C MFR BLD HPLC: 11.9 %

## 2022-02-09 PROCEDURE — 1101F PT FALLS ASSESS-DOCD LE1/YR: CPT | Performed by: INTERNAL MEDICINE

## 2022-02-09 PROCEDURE — G8753 SYS BP > OR = 140: HCPCS | Performed by: INTERNAL MEDICINE

## 2022-02-09 PROCEDURE — G8427 DOCREV CUR MEDS BY ELIG CLIN: HCPCS | Performed by: INTERNAL MEDICINE

## 2022-02-09 PROCEDURE — 83036 HEMOGLOBIN GLYCOSYLATED A1C: CPT | Performed by: INTERNAL MEDICINE

## 2022-02-09 PROCEDURE — G8755 DIAS BP > OR = 90: HCPCS | Performed by: INTERNAL MEDICINE

## 2022-02-09 PROCEDURE — 2022F DILAT RTA XM EVC RTNOPTHY: CPT | Performed by: INTERNAL MEDICINE

## 2022-02-09 PROCEDURE — 82962 GLUCOSE BLOOD TEST: CPT | Performed by: INTERNAL MEDICINE

## 2022-02-09 PROCEDURE — 3017F COLORECTAL CA SCREEN DOC REV: CPT | Performed by: INTERNAL MEDICINE

## 2022-02-09 PROCEDURE — G8510 SCR DEP NEG, NO PLAN REQD: HCPCS | Performed by: INTERNAL MEDICINE

## 2022-02-09 PROCEDURE — 99204 OFFICE O/P NEW MOD 45 MIN: CPT | Performed by: INTERNAL MEDICINE

## 2022-02-09 PROCEDURE — G8419 CALC BMI OUT NRM PARAM NOF/U: HCPCS | Performed by: INTERNAL MEDICINE

## 2022-02-09 PROCEDURE — 3046F HEMOGLOBIN A1C LEVEL >9.0%: CPT | Performed by: INTERNAL MEDICINE

## 2022-02-09 PROCEDURE — G8536 NO DOC ELDER MAL SCRN: HCPCS | Performed by: INTERNAL MEDICINE

## 2022-02-09 RX ORDER — LANCETS 33 GAUGE
EACH MISCELLANEOUS
COMMUNITY
Start: 2021-12-22

## 2022-02-09 NOTE — PROGRESS NOTES
History and Physical    Patient: Mihaela Sultana MRN: [id number]  SSN: [id number]    YOB: 1955  Age: 74 y.o. Sex: male      Subjective:      Mihaela Sultana is a 74 y.o. male tension, hyperlipidemia is sent to me by primary care physician Dr. Bernice Mooney for type 2 diabetes mellitus. Patient is present here with his wife today. Patient is a poor historian, and needs to be asked 1 question several times for him to understand and give answer. He is supposed to be on Toujeo 80 units at night but he takes it maybe once or twice a week, unable to give me a clear reason except that he does not like to take injections. He is also supposed to be on Janumet XR  mg once a day. He takes oral medications more regularly, relatively. Recently got prescription for glucometer but he does not know how to use it. He has not checked his blood glucose in a long time. Sometimes he takes Toujeo and then goes to bed, unable to tell me if he has symptoms of hypoglycemia after taking 80 units of Toujeo at night. He eats maybe 1 or 2 meals per day, mostly eating junk food, drinking soda, juice, coffee. Overdue for diabetic eye exam.  Not taking blood pressure and cholesterol medications regularly. He has lost weight recently, unable to quantify but according to chart it looks like he has gained few pounds since the last few months    Glucometer reading: Not checking blood sugar at home    · Diagnosis: > 10 years back  · Current treatment: Toujeo 80 units at night, Janumet XR  mg once a day   · Past treatment:   · Glucose checks   · Hyperglycemia:   · Hypoglycemia:   · Meals per day: 3, breakfast: eggs, dinner: junk food, snacks: soda, juice. Coffee.    · Exercise: walks  · DM related hospitalizations:     Smoking: yes  Family history of coronary artery disease/stroke: mother    Complications of DM:  · CAD: no  · CVA: no  · PVD: no  · Amputations: no   · Retinopathy: no; last exam was 2 years back  · Gastropathy: no  · Nephropathy: no  · Neuropathy: yes  Sees podiatrist:    Medications:  · Statin: atorvastatin 20 mg  · ACE-I: lisinopril  · ASA: yes    · Diabetes education: no    Past Medical History:   Diagnosis Date    Cancer [facility])     prostate    Colloid cyst of brain ([facility] [address] 6/2011    S/P excision cyst [address]    Constipation     Diabetes ([facility] [address]     IDDM    Diabetes ([facility] [address]     Erectile dysfunction     GERD (gastroesophageal reflux disease)     Hyperlipidemia     Hypertension     Migraines      Past Surgical History:   Procedure Laterality Date    HX GI  2012    HX HEENT  2006    exc mass right neck    HX OTHER SURGICAL  2012    brain    HX UROLOGICAL      prostate bx    NEUROLOGICAL PROCEDURE UNLISTED  6-2011    removal cranial [facility]      Family History   Problem Relation Age of Onset    Diabetes Mother     Heart Disease Mother     Stroke Mother     Heart Disease Father     Stroke Father     Hypertension Brother     Elevated Lipids Brother      Social History     Tobacco Use    Smoking status: Current Every Day Smoker     Packs/day: 0.50    Smokeless tobacco: Never Used    Tobacco comment: Started smoking at age 11   Substance Use Topics    Alcohol use: Not Currently      Prior to Admission medications    Medication Sig Start Date End Date Taking? Authorizing Provider   Oneuch Delica Plus Lancet 33 gauge misc USE TO TEST BLOOD GLUCOSE THREE TIMES DAILY 12/22/21  Yes Provider, Historical   lancets misc Test 3 times daily. Dx code E11.65  Insurance brand of choice 12/22/21  Yes Pat Lightning, DO   insulin glargine (LANTUS,BASAGLAR) 100 unit/mL (3 mL) inpn 80 units subcutaneously daily 12/22/21  Yes Pat Lightning, DO   Blood-Glucose Meter monitoring kit Finger stick glucose TID DX E11.65  Insurance brand of choice.  12/22/21  Yes Pat Lightning, DO   glucose blood VI test strips (ASCENSIA AUTODISC VI, ONE TOUCH ULTRA TEST VI) strip Finger stick glucose TID DX E11.65  Insurance brand of choice. 12/22/21  Yes Radha Clancy, DO   atorvastatin (LIPITOR) 20 mg tablet TAKE 1 TABLET BY MOUTH DAILY 11/8/21  Yes Radha Barriosk, DO   oxybutynin (DITROPAN) 5 mg tablet One tablet orally BID 9/10/21  Yes Radha Sophiek, DO   ibuprofen (MOTRIN) 600 mg tablet Take 1 Tablet by mouth every six (6) hours as needed for Pain. 6/8/21  Yes Catina Farris NP   lisinopriL (PRINIVIL, ZESTRIL) 40 mg tablet TAKE 1 TABLET BY MOUTH EVERY DAY 5/11/21  Yes Catina Farris NP   SITagliptin-metFORMIN (Janumet XR)  mg TM24 Take 1 Tab by mouth two (2) times daily (with meals). To use with free trial offer and savings card 2/3/21  Yes Radha Clnacy, DO   aspirin (Nino Chewable Aspirin) 81 mg chewable tablet Take 81 mg by mouth daily. Yes Provider, Historical   glucose blood VI test strips (ONETOUCH VERIO) strip Test 3 times daily. Dx code 250.00 4/29/15  Yes Amie Muller MD        No Known Allergies    Review of Systems:  ROS    A comprehensive review of systems was preformed and it is negative except mentioned in HPI    Objective:     Vitals:    02/09/22 1412 02/09/22 1418   BP: (!) 164/96 (!) 159/99   Pulse: 94 76   Temp: 97.5 °F (36.4 °C)    TempSrc: Temporal    SpO2: 98%    Weight: 177 lb 8 oz (80.5 kg)    Height: 5' 10\" (1.778 m)         Physical Exam:    Physical Exam  Vitals and nursing note reviewed. Constitutional:       Appearance: Normal appearance. HENT:      Head: Normocephalic and atraumatic. Eyes:      Extraocular Movements: Extraocular movements intact. Pupils: Pupils are equal, round, and reactive to light. Cardiovascular:      Rate and Rhythm: Normal rate and regular rhythm. Pulmonary:      Effort: Pulmonary effort is normal.      Breath sounds: Normal breath sounds. Musculoskeletal:         General: No swelling. Normal range of motion. Cervical back: Neck supple. Skin:     General: Skin is warm.    Neurological:      General: No focal deficit present. Mental Status: He is alert and oriented to person, place, and time. Psychiatric:         Mood and Affect: Mood normal.         Behavior: Behavior normal.       diabetic foot exam:  Bilateral diabetic foot exam was performed today. Dorsalis pedis pulses 1+ bilaterally. Monofilament sensation decreased bilaterally. Bilateral dry skin and calluses present. Labs and Imaging:    Last 3 Recorded Weights in this Encounter    02/09/22 1412   Weight: 177 lb 8 oz (80.5 kg)        Lab Results   Component Value Date/Time    Hemoglobin A1c 12.4 (H) 09/09/2021 11:49 AM    Hemoglobin A1c 10.5 (H) 06/08/2021 03:59 PM    Hemoglobin A1c 10.1 (H) 11/30/2020 01:26 PM    Hemoglobin A1c, External 11.0 01/17/2015 08:37 AM    Hemoglobin A1c, External 10.8 09/13/2014 09:37 AM        Assessment:     Patient Active Problem List   Diagnosis Code    Hypertension I10    Hyperlipidemia E78.5    Erectile dysfunction N52.9    Diabetes ([facility] [address] E11.9    Uncontrolled type 2 diabetes mellitus with hyperglycemia ([facility]) E11.65    Benign essential HTN I10    Mixed hyperlipidemia E78.2           Plan:     Type 2 diabetes mellitus, uncontrolled:  I reviewed progress note and labs from the referring provider's office. Hemoglobin A1c was 10.5% on 6-8-2021, 12.4% on 9/92021, 11.9% today. Fingerstick blood glucose is 169 mg/dL in my office today. Up to date with diabetes related annual labs: Yes June 2021  Up to date with diabetic eye exam: No  Plan:  Given patient's body habitus, I would like to check for endogenous insulin production. Get labs today. For now continue to stay off Toujeo until further notice from me. If he has type 2 diabetes confirmed, then I will try to maximize insulin sparing agents and minimize insulin use, given issues with compliance and low health literacy level. I will see him back in 1 month. Patient will come for nurse visit this Friday to learn how to use glucometer.   Start checking blood glucose once a day every day after that. Encourage patient to work on his diet, avoid all soda, fruit juice, eat 2-3 meals a day, avoid junk food. Essential hypertension:  Blood pressure elevated today. Generally blood pressure runs normal.  No microalbuminuria, last checked in June 2021. Advised patient to take blood pressure medication regularly. Mixed hyperlipidemia:  9-9-2021: Total cholesterol 143, triglycerides 166, LDL 81. Continue atorvastatin 20 mg regularly at bedtime. Bilateral foot calluses:  Referring patient to podiatry.     Orders Placed This Encounter    METABOLIC PANEL, BASIC    C-PEPTIDE    GLUTAMIC ACID DECARB AB    ZNT8 AUTO-ANTIBODIES    IA-2 AUTO-ANTIBODY    REFERRAL TO PODIATRY     Referral Priority:   Routine     Referral Type:   Consultation     Referral Reason:   Specialty Services Required     Referred to Provider:   Lauren Mckeon DPM     Requested Specialty:   Podiatry     Number of Visits Requested:   1    AMB POC GLUCOSE BLOOD, BY GLUCOSE MONITORING DEVICE    AMB POC HEMOGLOBIN A1C        Signed By: Yevgeniy Serna MD     February 9, 2022      Return to clinic 1 month

## 2022-02-09 NOTE — LETTER
2/9/2022    Patient: Katie Dubose   YOB: 1955   Date of Visit: 2/9/2022     Corine Uribe DO  [address] [address]  Via Fax: [phone]    Dear Corine Uribe DO,      Thank you for referring Mr. Dana Mckenna to [address] [place] [address] for evaluation. My notes for this consultation are attached. If you have questions, please do not hesitate to call me. I look forward to following your patient along with you.       Sincerely,    Azeb Vargas MD

## 2022-02-09 NOTE — PATIENT INSTRUCTIONS
Only 1 cup of coffee in morning, after that only water.     Eat 2-3 meals a day    Check glucose atleast once a day

## 2022-02-11 ENCOUNTER — OFFICE VISIT (OUTPATIENT)
Dept: ENDOCRINOLOGY | Age: 67
End: 2022-02-11

## 2022-02-11 DIAGNOSIS — E11.65 UNCONTROLLED TYPE 2 DIABETES MELLITUS WITH HYPERGLYCEMIA (HCC): Primary | ICD-10-CM

## 2022-02-12 NOTE — PROGRESS NOTES
HISTORY OF PRESENT ILLNESS  Dustin Marquez is a 74 y.o. male is here with cc of nocturia. He has a history of hyperlipidemia. DMII.,  Status post radical prostatectomy 2011, he denies fevers, chills, nausea, vomiting weight loss or bone pain. He is a smoker and  diabetic  Chief Complaint   Patient presents with    New Patient     ROS, IPSS Forms    Nocturia     Referred Dr Denia Pruett, notes in chart     IPSS is only 5, he does have urgency and nocturia -he has a major problem with urgency incontinence  Deniesflank pain   Dysuria, urethral discharge, hematuria  IPSS  Score: 8      HPI  Chronic Conditions Addressed Today     1. Prostate cancer ([facility] [address]     Relevant Orders     AMB POC URINALYSIS DIP STICK AUTO W/O MICRO (Completed)    2. Erectile dysfunction     Relevant Orders     AMB POC URINALYSIS DIP STICK AUTO W/O MICRO (Completed)    3. Diabetes ([address] - Primary     Relevant Orders     AMB POC URINALYSIS DIP STICK AUTO W/O MICRO (Completed)    4. Nocturia     Relevant Orders     AMB POC URINALYSIS DIP STICK AUTO W/O MICRO (Completed)    5. Smoker     Relevant Orders     AMB POC URINALYSIS DIP STICK AUTO W/O MICRO (Completed)    6. Glycosuria        Patient denies the symptoms of COVID-19 per routine screening guidelines. Review of Systems   All other systems reviewed and are negative. Past Medical History:  PMHx (including negatives):  has a past medical history of Burning with urination, Cancer ([address] Colloid cyst of brain ([facility] [address] (6/2011), Constipation, Diabetes ([address] Diabetes ([address] Erectile dysfunction, GERD (gastroesophageal reflux disease), Hyperlipidemia, Hypertension, and Migraines. Prostate cancer-last PSA was less than 0.1 in 2021  PSurgHx:  has a past surgical history that includes hx urological; hx heent (2006); neurological procedure unlisted (6-2011); hx other surgical (2012); and hx gi (2012). Patient had a robotic prostatectomy August 2011  PSocHx:  reports that he has been smoking.  He has been smoking about 0.50 packs per day. He has never used smokeless tobacco. He reports previous alcohol use. He reports that he does not use drugs. Home Medications    Medication Sig Start Date End Date Taking? Authorizing Provider   OneTouch Delica Plus Lancet 33 gauge misc USE TO TEST BLOOD GLUCOSE THREE TIMES DAILY 12/22/21  Yes Provider, Historical   lancets misc Test 3 times daily. Dx code E11.65  Insurance brand of choice 12/22/21  Yes Megan Rizo DO   Blood-Glucose Meter monitoring kit Finger stick glucose TID DX E11.65  Insurance brand of choice. 12/22/21  Yes Megan Rizo DO   glucose blood VI test strips (ASCENSIA AUTODISC VI, ONE TOUCH ULTRA TEST VI) strip Finger stick glucose TID DX E11.65  Insurance brand of choice. 12/22/21  Yes Megan Rizo DO   atorvastatin (LIPITOR) 20 mg tablet TAKE 1 TABLET BY MOUTH DAILY 11/8/21  Yes Megan Rizo DO   ibuprofen (MOTRIN) 600 mg tablet Take 1 Tablet by mouth every six (6) hours as needed for Pain. 6/8/21  Yes Catina Farris NP   lisinopriL (PRINIVIL, ZESTRIL) 40 mg tablet TAKE 1 TABLET BY MOUTH EVERY DAY 5/11/21  Yes Catina Farris NP   SITagliptin-metFORMIN (Janumet XR)  mg TM24 Take 1 Tab by mouth two (2) times daily (with meals). To use with free trial offer and savings card 2/3/21  Yes Megan Rizo DO   aspirin (Nino Chewable Aspirin) 81 mg chewable tablet Take 81 mg by mouth daily. Yes Provider, Historical   glucose blood VI test strips (ONETOUCH VERIO) strip Test 3 times daily. Dx code 250.00 4/29/15  Yes Jeaneth Rodríguez MD      Physical Exam  Vitals and nursing note reviewed. Constitutional:       Appearance: Normal appearance. HENT:      Head: Normocephalic. Nose: Nose normal.   Eyes:      Pupils: Pupils are equal, round, and reactive to light. Cardiovascular:      Rate and Rhythm: Normal rate and regular rhythm. Pulmonary:      Effort: Pulmonary effort is normal.   Abdominal:      General: Abdomen is flat.       Palpations: Abdomen is soft. Genitourinary:     Penis: Normal.       Testes: Normal.      Prostate: Normal.   Musculoskeletal:         General: Normal range of motion. Cervical back: Normal range of motion. Skin:     General: Skin is warm. Neurological:      General: No focal deficit present. Mental Status: He is alert and oriented to person, place, and time. Psychiatric:         Mood and Affect: Mood normal.         Behavior: Behavior normal.         Thought Content: Thought content normal.         Judgment: Judgment normal.         ASSESSMENT and PLAN  Diagnoses and all orders for this visit:    1. Type 2 diabetes mellitus without complication, without long-term current use of insulin (HCC)  -     AMB POC URINALYSIS DIP STICK AUTO W/O MICRO    2. Erectile dysfunction, unspecified erectile dysfunction type  -     AMB POC URINALYSIS DIP STICK AUTO W/O MICRO    3. Nocturia  -     AMB POC URINALYSIS DIP STICK AUTO W/O MICRO    4. Prostate cancer (HCC)  -     AMB POC URINALYSIS DIP STICK AUTO W/O MICRO    5. Smoker  -     AMB POC URINALYSIS DIP STICK AUTO W/O MICRO    6. Glycosuria     5. We will start on gemtessa see back in a couple weeks and see how he is doing    6.   With the 4 blood sugar in his urine he needs better handle on his diabetes      Roberth Verdugo MD

## 2022-02-15 ENCOUNTER — OFFICE VISIT (OUTPATIENT)
Dept: UROLOGY | Age: 67
End: 2022-02-15
Payer: MEDICARE

## 2022-02-15 VITALS
OXYGEN SATURATION: 100 % | RESPIRATION RATE: 16 BRPM | WEIGHT: 175 LBS | HEART RATE: 82 BPM | HEIGHT: 70 IN | BODY MASS INDEX: 25.05 KG/M2 | TEMPERATURE: 97.9 F | SYSTOLIC BLOOD PRESSURE: 185 MMHG | DIASTOLIC BLOOD PRESSURE: 98 MMHG

## 2022-02-15 DIAGNOSIS — F17.200 SMOKER: ICD-10-CM

## 2022-02-15 DIAGNOSIS — R81 GLYCOSURIA: ICD-10-CM

## 2022-02-15 DIAGNOSIS — C61 PROSTATE CANCER (HCC): ICD-10-CM

## 2022-02-15 DIAGNOSIS — E11.9 TYPE 2 DIABETES MELLITUS WITHOUT COMPLICATION, WITHOUT LONG-TERM CURRENT USE OF INSULIN (HCC): Primary | ICD-10-CM

## 2022-02-15 DIAGNOSIS — R35.1 NOCTURIA: ICD-10-CM

## 2022-02-15 DIAGNOSIS — N52.9 ERECTILE DYSFUNCTION, UNSPECIFIED ERECTILE DYSFUNCTION TYPE: ICD-10-CM

## 2022-02-15 LAB
BILIRUB UR QL: NEGATIVE
GLUCOSE UR-MCNC: 1000 MG/DL
KETONES P FAST UR STRIP-MCNC: NEGATIVE MG/DL
PH UR STRIP: 6 [PH] (ref 4.6–8)
PROT UR QL STRIP: NEGATIVE
SP GR UR STRIP: 1.01 (ref 1–1.03)
UA UROBILINOGEN AMB POC: NORMAL (ref 0.2–1)
URINALYSIS CLARITY POC: CLEAR
URINALYSIS COLOR POC: YELLOW
URINE BLOOD POC: NORMAL
URINE LEUKOCYTES POC: NEGATIVE
URINE NITRITES POC: NEGATIVE

## 2022-02-15 PROCEDURE — 81003 URINALYSIS AUTO W/O SCOPE: CPT | Performed by: UROLOGY

## 2022-02-15 PROCEDURE — 99214 OFFICE O/P EST MOD 30 MIN: CPT | Performed by: UROLOGY

## 2022-02-15 PROCEDURE — 3017F COLORECTAL CA SCREEN DOC REV: CPT | Performed by: UROLOGY

## 2022-02-15 PROCEDURE — G8755 DIAS BP > OR = 90: HCPCS | Performed by: UROLOGY

## 2022-02-15 PROCEDURE — 99204 OFFICE O/P NEW MOD 45 MIN: CPT | Performed by: UROLOGY

## 2022-02-15 PROCEDURE — G8427 DOCREV CUR MEDS BY ELIG CLIN: HCPCS | Performed by: UROLOGY

## 2022-02-15 PROCEDURE — 1101F PT FALLS ASSESS-DOCD LE1/YR: CPT | Performed by: UROLOGY

## 2022-02-15 PROCEDURE — G8536 NO DOC ELDER MAL SCRN: HCPCS | Performed by: UROLOGY

## 2022-02-15 PROCEDURE — 3046F HEMOGLOBIN A1C LEVEL >9.0%: CPT | Performed by: UROLOGY

## 2022-02-15 PROCEDURE — 2022F DILAT RTA XM EVC RTNOPTHY: CPT | Performed by: UROLOGY

## 2022-02-15 PROCEDURE — G8419 CALC BMI OUT NRM PARAM NOF/U: HCPCS | Performed by: UROLOGY

## 2022-02-15 PROCEDURE — G8432 DEP SCR NOT DOC, RNG: HCPCS | Performed by: UROLOGY

## 2022-02-15 PROCEDURE — G8753 SYS BP > OR = 140: HCPCS | Performed by: UROLOGY

## 2022-02-15 NOTE — PROGRESS NOTES
Chief Complaint   Patient presents with    New Patient     ROS, IPSS Forms    Nocturia     Referred Dr Archie Zuniga, notes in chart         1. Have you been to the ER, urgent care clinic since your last visit? Hospitalized since your last visit? No    2. Have you seen or consulted any other health care providers outside of the [address] [place] [address] since your last visit? Include any pap smears or colon screening.  Yes When: 12/22/21 Where: Dr Archie Zuniga Reason for visit: Nocturia      Visit Vitals  BP (!) 185/98 (BP 1 Location: Right upper arm, BP Patient Position: Sitting, BP Cuff Size: Adult)   Pulse 82   Temp 97.9 °F (36.6 °C) (Temporal)   Resp 16   Ht 5' 10\" (1.778 m)   Wt 175 lb (79.4 kg)   SpO2 100%   BMI 25.11 kg/m²

## 2022-02-16 LAB
BUN SERPL-MCNC: 10 MG/DL (ref 8–27)
BUN/CREAT SERPL: 10 (ref 10–24)
C PEPTIDE SERPL-MCNC: 1.3 NG/ML (ref 1.1–4.4)
CALCIUM SERPL-MCNC: 9.3 MG/DL (ref 8.6–10.2)
CHLORIDE SERPL-SCNC: 101 MMOL/L (ref 96–106)
CO2 SERPL-SCNC: 23 MMOL/L (ref 20–29)
CREAT SERPL-MCNC: 0.99 MG/DL (ref 0.76–1.27)
GAD65 AB SER IA-ACNC: <5 U/ML (ref 0–5)
GLUCOSE SERPL-MCNC: 108 MG/DL (ref 65–99)
ISLET CELL512 AB SER-ACNC: <7.5 U/ML
POTASSIUM SERPL-SCNC: 4.1 MMOL/L (ref 3.5–5.2)
SODIUM SERPL-SCNC: 140 MMOL/L (ref 134–144)
ZNT8 AB: <15 U/ML

## 2022-02-28 PROBLEM — R81 GLYCOSURIA: Status: ACTIVE | Noted: 2022-02-28

## 2022-03-01 NOTE — PROGRESS NOTES
HISTORY OF PRESENT ILLNESS  Collette Ha is a 74 y.o. male. is here with cc of nocturia. He has a history of hyperlipidemia. DMII.,  Status post radical prostatectomy 2011. He is a smoker and  diabetic He was given gemtessa for nocturia last appointment. The patient's wife reports that the patient took gemtessa only one time. She reports patient is drinking at night time, awake at night( sleeps few hours) IPSS is 15, he has urgency, but able to make it to the bathroom. Goes to the bathroom 3 times per night     PVR is 48cc  So a patient to had a radical prostatectomy with urinary incontinence and nocturia he did have some response to the one time he took Lambert Stare. We will keep him on that medication. Residual day is small and hopefully this will make his life better we will see him back in a month. Will need to follow-up with his residuals and PSA as well  HPI    Patient denies the symptoms of COVID-19 per routine screening guidelines. Review of Systems   Constitutional: Negative. HENT: Negative. Respiratory: Negative. Gastrointestinal: Negative. Genitourinary: Positive for frequency. Musculoskeletal: Negative. Skin: Negative. Neurological: Negative. Psychiatric/Behavioral: Negative. Past Medical History:  PMHx (including negatives):  has a past medical history of Burning with urination, Cancer ([address] Colloid cyst of brain ([facility] [address] (6/2011), Constipation, Diabetes ([address] Diabetes ([address] Erectile dysfunction, GERD (gastroesophageal reflux disease), Hyperlipidemia, Hypertension, and Migraines. PSurgHx:  has a past surgical history that includes hx urological; hx heent (2006); neurological procedure unlisted (6-2011); hx other surgical (2012); and hx gi (2012). PSocHx:  reports that he has been smoking. He has been smoking about 0.50 packs per day. He has never used smokeless tobacco. He reports previous alcohol use. He reports that he does not use drugs.    Home Medications Medication Sig Start Date End Date Taking? Authorizing Provider   OneTouch Delica Plus Lancet 33 gauge misc USE TO TEST BLOOD GLUCOSE THREE TIMES DAILY 12/22/21   Provider, Historical   lancets misc Test 3 times daily. Dx code E11.65  Insurance brand of choice 12/22/21   Meg Ramos, DO   Blood-Glucose Meter monitoring kit Finger stick glucose TID DX E11.65  Insurance brand of choice. 12/22/21   Meg Ramos, DO   glucose blood VI test strips (ASCENSIA AUTODISC VI, ONE TOUCH ULTRA TEST VI) strip Finger stick glucose TID DX E11.65  Insurance brand of choice. 12/22/21   Meg Ramos, DO   atorvastatin (LIPITOR) 20 mg tablet TAKE 1 TABLET BY MOUTH DAILY 11/8/21   Meg Ramos, DO   ibuprofen (MOTRIN) 600 mg tablet Take 1 Tablet by mouth every six (6) hours as needed for Pain. 5/9/72   Catina Farris, MARYSE   lisinopriL (PRINIVIL, ZESTRIL) 40 mg tablet TAKE 1 TABLET BY MOUTH EVERY DAY 1/81/80   Catina Younger NP   SITagliptin-metFORMIN (Janumet XR)  mg TM24 Take 1 Tab by mouth two (2) times daily (with meals). To use with free trial offer and savings card 2/3/21   Meg Rachel, DO   aspirin (Nino Chewable Aspirin) 81 mg chewable tablet Take 81 mg by mouth daily. Provider, Historical   glucose blood VI test strips (ONETOUCH VERIO) strip Test 3 times daily. Dx code 250.00 4/29/15   Noble Hodgkins, MD      Physical Exam  Physical Exam  Vitals and nursing note reviewed. Constitutional:       Appearance: Normal appearance. HENT:      Head: Normocephalic. Nose: Nose normal.   Eyes:      Pupils: Pupils are equal, round, and reactive to light. Cardiovascular:      Rate and Rhythm: Normal rate and regular rhythm. Pulmonary:      Effort: Pulmonary effort is normal.   Abdominal:      General: Abdomen is flat. Palpations: Abdomen is soft. Genitourinary:     Penis: Normal.       Testes: Normal.      Prostate: Normal.   Musculoskeletal:         General: Normal range of motion.       Cervical back: Normal range of motion. Skin:     General: Skin is warm. Neurological:      General: No focal deficit present. Mental Status: He is alert and oriented to person, place, and time. Psychiatric:         Mood and Affect: Mood normal.         Behavior: Behavior normal.         Thought Content: Thought content normal.         Judgment: Judgment normal  ASSESSMENT and PLAN  Urgency  Nocturia  Patient will take his gemtessa for 1 month and come back for evaluation.

## 2022-03-02 ENCOUNTER — OFFICE VISIT (OUTPATIENT)
Dept: UROLOGY | Age: 67
End: 2022-03-02
Payer: MEDICARE

## 2022-03-02 VITALS
HEART RATE: 96 BPM | DIASTOLIC BLOOD PRESSURE: 79 MMHG | TEMPERATURE: 97.1 F | BODY MASS INDEX: 25.05 KG/M2 | WEIGHT: 175 LBS | HEIGHT: 70 IN | SYSTOLIC BLOOD PRESSURE: 142 MMHG | OXYGEN SATURATION: 98 %

## 2022-03-02 DIAGNOSIS — R81 GLYCOSURIA: ICD-10-CM

## 2022-03-02 DIAGNOSIS — R35.1 NOCTURIA: ICD-10-CM

## 2022-03-02 DIAGNOSIS — C61 PROSTATE CANCER (HCC): Primary | ICD-10-CM

## 2022-03-02 LAB
BILIRUB UR QL: NEGATIVE
GLUCOSE UR-MCNC: >=1000 MG/DL
KETONES P FAST UR STRIP-MCNC: NEGATIVE MG/DL
PH UR STRIP: 5.5 [PH] (ref 4.6–8)
PROT UR QL STRIP: NEGATIVE
PVR POC: NORMAL CC
SP GR UR STRIP: 1 (ref 1–1.03)
UA UROBILINOGEN AMB POC: NORMAL (ref 0.2–1)
URINALYSIS CLARITY POC: CLEAR
URINALYSIS COLOR POC: YELLOW
URINE BLOOD POC: NEGATIVE
URINE LEUKOCYTES POC: NEGATIVE
URINE NITRITES POC: NEGATIVE

## 2022-03-02 PROCEDURE — G8754 DIAS BP LESS 90: HCPCS | Performed by: UROLOGY

## 2022-03-02 PROCEDURE — G8536 NO DOC ELDER MAL SCRN: HCPCS | Performed by: UROLOGY

## 2022-03-02 PROCEDURE — 51798 US URINE CAPACITY MEASURE: CPT | Performed by: UROLOGY

## 2022-03-02 PROCEDURE — G8419 CALC BMI OUT NRM PARAM NOF/U: HCPCS | Performed by: UROLOGY

## 2022-03-02 PROCEDURE — G8427 DOCREV CUR MEDS BY ELIG CLIN: HCPCS | Performed by: UROLOGY

## 2022-03-02 PROCEDURE — G8432 DEP SCR NOT DOC, RNG: HCPCS | Performed by: UROLOGY

## 2022-03-02 PROCEDURE — 99204 OFFICE O/P NEW MOD 45 MIN: CPT | Performed by: UROLOGY

## 2022-03-02 PROCEDURE — G8753 SYS BP > OR = 140: HCPCS | Performed by: UROLOGY

## 2022-03-02 PROCEDURE — 81003 URINALYSIS AUTO W/O SCOPE: CPT | Performed by: UROLOGY

## 2022-03-02 NOTE — PROGRESS NOTES
Chief Complaint   Patient presents with    Follow-up     ros    Prostate Cancer    Erectile Dysfunction    Nocturia       PHQ-9 score is  PHQ 9 Score: 0 Negative    Vitals:    03/02/22 1439   BP: (!) 142/79   Pulse: 96   Temp: 97.1 °F (36.2 °C)   TempSrc: Temporal   SpO2: 98%   Weight: 175 lb (79.4 kg)   Height: 5' 10\" (1.778 m)   PainSc:   0 - No pain      1. \"Have you been to the ER, urgent care clinic since your last visit? Hospitalized since your last visit? \" No    2. \"Have you seen or consulted any other health care providers outside of the [address] [place] [address] since your last visit? \" No     3. For patients aged 39-70: Has the patient had a colonoscopy / FIT/ Cologuard? No      If the patient is female:    4. For patients aged 41-77: Has the patient had a mammogram within the past 2 years? No      5. For patients aged 21-65: Has the patient had a pap smear?  No

## 2022-03-03 ENCOUNTER — OFFICE VISIT (OUTPATIENT)
Dept: PODIATRY | Age: 67
End: 2022-03-03
Payer: MEDICARE

## 2022-03-03 VITALS
HEIGHT: 70 IN | DIASTOLIC BLOOD PRESSURE: 85 MMHG | WEIGHT: 175 LBS | TEMPERATURE: 97.5 F | SYSTOLIC BLOOD PRESSURE: 145 MMHG | HEART RATE: 105 BPM | BODY MASS INDEX: 25.05 KG/M2

## 2022-03-03 DIAGNOSIS — L85.3 XEROSIS CUTIS: ICD-10-CM

## 2022-03-03 DIAGNOSIS — L60.3 ONYCHODYSTROPHY: ICD-10-CM

## 2022-03-03 DIAGNOSIS — E11.65 UNCONTROLLED TYPE 2 DIABETES MELLITUS WITH HYPERGLYCEMIA (HCC): Primary | ICD-10-CM

## 2022-03-03 PROCEDURE — 1101F PT FALLS ASSESS-DOCD LE1/YR: CPT | Performed by: PODIATRIST

## 2022-03-03 PROCEDURE — 99203 OFFICE O/P NEW LOW 30 MIN: CPT | Performed by: PODIATRIST

## 2022-03-03 PROCEDURE — G8432 DEP SCR NOT DOC, RNG: HCPCS | Performed by: PODIATRIST

## 2022-03-03 PROCEDURE — 3046F HEMOGLOBIN A1C LEVEL >9.0%: CPT | Performed by: PODIATRIST

## 2022-03-03 PROCEDURE — G8753 SYS BP > OR = 140: HCPCS | Performed by: PODIATRIST

## 2022-03-03 PROCEDURE — 11721 DEBRIDE NAIL 6 OR MORE: CPT | Performed by: PODIATRIST

## 2022-03-03 PROCEDURE — G8536 NO DOC ELDER MAL SCRN: HCPCS | Performed by: PODIATRIST

## 2022-03-03 PROCEDURE — 3017F COLORECTAL CA SCREEN DOC REV: CPT | Performed by: PODIATRIST

## 2022-03-03 PROCEDURE — 2022F DILAT RTA XM EVC RTNOPTHY: CPT | Performed by: PODIATRIST

## 2022-03-03 PROCEDURE — G8754 DIAS BP LESS 90: HCPCS | Performed by: PODIATRIST

## 2022-03-03 PROCEDURE — G8427 DOCREV CUR MEDS BY ELIG CLIN: HCPCS | Performed by: PODIATRIST

## 2022-03-03 PROCEDURE — G8419 CALC BMI OUT NRM PARAM NOF/U: HCPCS | Performed by: PODIATRIST

## 2022-03-03 RX ORDER — AMMONIUM LACTATE 12 G/100G
CREAM TOPICAL 2 TIMES DAILY
Qty: 280 G | Refills: 3 | Status: SHIPPED | OUTPATIENT
Start: 2022-03-03 | End: 2022-10-28 | Stop reason: ALTCHOICE

## 2022-03-03 NOTE — PROGRESS NOTES
[facility]    Subjective:         Patient is a 74 y.o. male who is being seen as a new pt for diabetic pedal exam.  Patient states he has close follow-up with his PCP (Dr. Corine Uribe). He states his last office visit with his PCP was 12/22/2021. He describes his diabetes as being out of control, noting his last hemoglobin A1c was 11.9%. He denies any overt pedal pain. He denies any recent pedal trauma. He denies any systemic signs of infection but does state his nails are thick/discolored and his skin was very dry. He denies any other lower extremity complaints    Past Medical History:   Diagnosis Date    Burning with urination     Cancer ([facility]     prostate    Colloid cyst of brain ([facility] 6/2011    S/[facility] at [facility] [address] [place] [address]    Constipation     Diabetes ([facility]     IDDM    Diabetes ([facility]     Erectile dysfunction     GERD (gastroesophageal reflux disease)     Hyperlipidemia     Hypertension     Migraines      Past Surgical History:   Procedure Laterality Date    HX GI  2012    HX HEENT  2006    exc mass right neck    HX OTHER SURGICAL  2012    brain    HX UROLOGICAL      prostate bx    NEUROLOGICAL PROCEDURE UNLISTED  6-2011    removal [facility] at [facility]       Family History   Problem Relation Age of Onset    Diabetes Mother     Heart Disease Mother     Stroke Mother     Heart Disease Father     Stroke Father     Hypertension Brother     Elevated Lipids Brother       Social History     Tobacco Use    Smoking status: Current Every Day Smoker     Packs/day: 0.50    Smokeless tobacco: Never Used    Tobacco comment: Started smoking at age 11   Substance Use Topics    Alcohol use: Not Currently     No Known Allergies  Prior to Admission medications    Medication Sig Start Date End Date Taking?  Authorizing Provider   OneTouch Delica Plus Lancet 33 gauge misc USE TO TEST BLOOD GLUCOSE THREE TIMES DAILY 12/22/21   Provider, Historical lancets misc Test 3 times daily. Dx code E11.65  Insurance brand of choice 12/22/21   Praneeth Montiel, DO   Blood-Glucose Meter monitoring kit Finger stick glucose TID DX E11.65  Insurance brand of choice. 12/22/21   Praneeth Montiel, DO   glucose blood VI test strips (ASCENSIA AUTODISC VI, ONE TOUCH ULTRA TEST VI) strip Finger stick glucose TID DX E11.65  Insurance brand of choice. 12/22/21   Praneeth Montiel, DO   atorvastatin (LIPITOR) 20 mg tablet TAKE 1 TABLET BY MOUTH DAILY 11/8/21   Praneeth Montiel, DO   ibuprofen (MOTRIN) 600 mg tablet Take 1 Tablet by mouth every six (6) hours as needed for Pain. 2/0/17   Catina Farris NP   lisinopriL (PRINIVIL, ZESTRIL) 40 mg tablet TAKE 1 TABLET BY MOUTH EVERY DAY 5/14/70   Catina Godfrey NP   SITagliptin-metFORMIN (Janumet XR)  mg TM24 Take 1 Tab by mouth two (2) times daily (with meals). To use with free trial offer and savings card 2/3/21   Praneeth Montiel, DO   aspirin (Nino Chewable Aspirin) 81 mg chewable tablet Take 81 mg by mouth daily. Provider, Historical   glucose blood VI test strips (ONETOUCH VERIO) strip Test 3 times daily. Dx code 250.00 4/29/15   Azeb Vargas MD       Review of Systems   Constitutional: Negative. HENT: Negative. Eyes: Negative. Respiratory: Negative. Cardiovascular: Negative. Gastrointestinal: Negative. Endocrine: Negative. Genitourinary: Negative. Musculoskeletal: Positive for arthralgias. Skin: Negative. Allergic/Immunologic: Positive for immunocompromised state. Neurological: Negative. Hematological: Negative. Psychiatric/Behavioral: Negative. All other systems reviewed and are negative. Objective:     Visit Vitals  BP (!) 145/85 (BP 1 Location: Right upper arm, BP Patient Position: Sitting, BP Cuff Size: Adult)   Pulse (!) 105   Temp 97.5 °F (36.4 °C) (Temporal)   Ht 5' 10\" (1.778 m)   Wt 175 lb (79.4 kg)   BMI 25.11 kg/m²       Physical Exam  Vitals reviewed.    Constitutional: Appearance: He is normal weight. Cardiovascular:      Pulses:           Dorsalis pedis pulses are 2+ on the right side and 2+ on the left side. Posterior tibial pulses are 2+ on the right side and 2+ on the left side. Pulmonary:      Effort: Pulmonary effort is normal.   Musculoskeletal:      Right lower leg: No edema. Left lower leg: No edema. Right foot: Normal range of motion. No deformity or bunion. Left foot: Normal range of motion. No deformity or bunion. Feet:      Right foot:      Protective Sensation: 10 sites tested. 10 sites sensed. Skin integrity: Dry skin present. Toenail Condition: Right toenails are abnormally thick. Left foot:      Protective Sensation: 10 sites tested. 10 sites sensed. Skin integrity: Dry skin present. Toenail Condition: Left toenails are abnormally thick. Lymphadenopathy:      Lower Body: No right inguinal adenopathy. No left inguinal adenopathy. Skin:     General: Skin is warm. Capillary Refill: Capillary refill takes 2 to 3 seconds. Comments: Absent pedal hair growth with hyperpigmentation   Neurological:      Mental Status: He is alert and oriented to person, place, and time. Comments: Paresthesias with burning noted   Psychiatric:         Mood and Affect: Mood and affect normal.         Behavior: Behavior is cooperative.          Data Review:   Recent Results (from the past 24 hour(s))   AMB POC URINALYSIS DIP STICK AUTO W/O MICRO    Collection Time: 03/02/22  4:34 PM   Result Value Ref Range    Color (UA POC) Yellow     Clarity (UA POC) Clear     Glucose (UA POC) >=1000 Negative mg/dL    Bilirubin (UA POC) Negative Negative    Ketones (UA POC) Negative Negative    Specific gravity (UA POC) 1.005 1.001 - 1.035    Blood (UA POC) Negative Negative    pH (UA POC) 5.5 4.6 - 8.0    Protein (UA POC) Negative Negative    Urobilinogen (UA POC) 0.2 mg/dL 0.2 - 1    Nitrites (UA POC) Negative Negative    Leukocyte esterase (UA POC) Negative Negative         Impression:       ICD-10-CM ICD-9-CM    1. Uncontrolled type 2 diabetes mellitus with hyperglycemia (HCC)  E11.65 250.02    2. Xerosis cutis  L85.3 706.8    3. Onychodystrophy  L60.3 703.8        Recommendation:     Patient seen and evaluated in the office  Discussed and educated patient regarding their current medical condition at it pertains to her diabetes and her thick/discolored toenails. Instructed patient to monitor their feet daily for possible wound formation, be compliant with all medications and wear supportive shoe gear for wound prevention. Reviewed and discussed most recent lab work, specifically as it pertains to her diabetes. Pt verbalized understanding of all discussed and reviewed  A sharp toenail plate debridement was performed to all 10 pedal digits with a nail nipper without incident. Patient tolerated well no dressing was needed  A prescription was given for ammonium lactate 12% cream to be applied twice daily to all affected xerotic skin      Jose Mace, [facility], [address] [place] [address] and [facility]  [address] [place] [address]  O: [phone]  F: [phone]  C: [phone]

## 2022-03-03 NOTE — PROGRESS NOTES
1. Have you been to the ER, urgent care clinic since your last visit? Hospitalized since your last visit? No    2. Have you seen or consulted any other health care providers outside of the [address] [place] [address] since your last visit? Include any pap smears or colon screening.  No

## 2022-03-17 ENCOUNTER — TELEPHONE (OUTPATIENT)
Dept: UROLOGY | Age: 67
End: 2022-03-17

## 2022-03-18 ENCOUNTER — OFFICE VISIT (OUTPATIENT)
Dept: ENDOCRINOLOGY | Age: 67
End: 2022-03-18
Payer: MEDICARE

## 2022-03-18 VITALS
WEIGHT: 172.1 LBS | TEMPERATURE: 98.6 F | OXYGEN SATURATION: 98 % | RESPIRATION RATE: 22 BRPM | HEIGHT: 69 IN | SYSTOLIC BLOOD PRESSURE: 141 MMHG | HEART RATE: 118 BPM | BODY MASS INDEX: 25.49 KG/M2 | DIASTOLIC BLOOD PRESSURE: 82 MMHG

## 2022-03-18 DIAGNOSIS — E78.2 MIXED HYPERLIPIDEMIA: ICD-10-CM

## 2022-03-18 DIAGNOSIS — I10 BENIGN ESSENTIAL HTN: ICD-10-CM

## 2022-03-18 DIAGNOSIS — E11.65 UNCONTROLLED TYPE 2 DIABETES MELLITUS WITH HYPERGLYCEMIA (HCC): Primary | ICD-10-CM

## 2022-03-18 PROBLEM — R81 GLYCOSURIA: Status: ACTIVE | Noted: 2022-02-28

## 2022-03-18 LAB
BILIRUB UR QL STRIP: NEGATIVE
GLUCOSE POC: 477 MG/DL
GLUCOSE UR-MCNC: NORMAL MG/DL
KETONES P FAST UR STRIP-MCNC: NEGATIVE MG/DL
PH UR STRIP: 5.5 [PH] (ref 4.6–8)
PROT UR QL STRIP: NEGATIVE
SP GR UR STRIP: 1.01 (ref 1–1.03)
UA UROBILINOGEN AMB POC: NORMAL (ref 0.2–1)
URINALYSIS CLARITY POC: NORMAL
URINALYSIS COLOR POC: NORMAL
URINE BLOOD POC: NEGATIVE
URINE LEUKOCYTES POC: NEGATIVE
URINE NITRITES POC: NEGATIVE

## 2022-03-18 PROCEDURE — G8419 CALC BMI OUT NRM PARAM NOF/U: HCPCS | Performed by: INTERNAL MEDICINE

## 2022-03-18 PROCEDURE — 2022F DILAT RTA XM EVC RTNOPTHY: CPT | Performed by: INTERNAL MEDICINE

## 2022-03-18 PROCEDURE — G8536 NO DOC ELDER MAL SCRN: HCPCS | Performed by: INTERNAL MEDICINE

## 2022-03-18 PROCEDURE — G8427 DOCREV CUR MEDS BY ELIG CLIN: HCPCS | Performed by: INTERNAL MEDICINE

## 2022-03-18 PROCEDURE — G8753 SYS BP > OR = 140: HCPCS | Performed by: INTERNAL MEDICINE

## 2022-03-18 PROCEDURE — G8754 DIAS BP LESS 90: HCPCS | Performed by: INTERNAL MEDICINE

## 2022-03-18 PROCEDURE — 81003 URINALYSIS AUTO W/O SCOPE: CPT | Performed by: INTERNAL MEDICINE

## 2022-03-18 PROCEDURE — 82962 GLUCOSE BLOOD TEST: CPT | Performed by: INTERNAL MEDICINE

## 2022-03-18 PROCEDURE — G8432 DEP SCR NOT DOC, RNG: HCPCS | Performed by: INTERNAL MEDICINE

## 2022-03-18 PROCEDURE — 1101F PT FALLS ASSESS-DOCD LE1/YR: CPT | Performed by: INTERNAL MEDICINE

## 2022-03-18 PROCEDURE — 99214 OFFICE O/P EST MOD 30 MIN: CPT | Performed by: INTERNAL MEDICINE

## 2022-03-18 PROCEDURE — 3017F COLORECTAL CA SCREEN DOC REV: CPT | Performed by: INTERNAL MEDICINE

## 2022-03-18 PROCEDURE — 3046F HEMOGLOBIN A1C LEVEL >9.0%: CPT | Performed by: INTERNAL MEDICINE

## 2022-03-18 RX ORDER — INSULIN GLARGINE 300 U/ML
INJECTION, SOLUTION SUBCUTANEOUS
Qty: 2 ADJUSTABLE DOSE PRE-FILLED PEN SYRINGE | Refills: 3 | Status: SHIPPED | OUTPATIENT
Start: 2022-03-18 | End: 2022-09-20 | Stop reason: SDUPTHER

## 2022-03-18 RX ORDER — SITAGLIPTIN AND METFORMIN HYDROCHLORIDE 100; 1000 MG/1; MG/1
1 TABLET, FILM COATED, EXTENDED RELEASE ORAL DAILY
Qty: 30 TABLET | Refills: 3 | Status: SHIPPED | OUTPATIENT
Start: 2022-03-18 | End: 2022-04-17

## 2022-03-18 RX ORDER — PEN NEEDLE, DIABETIC 30 GX3/16"
NEEDLE, DISPOSABLE MISCELLANEOUS
Qty: 100 EACH | Refills: 3 | Status: SHIPPED | OUTPATIENT
Start: 2022-03-18

## 2022-03-18 NOTE — PROGRESS NOTES
1. \"Have you been to the ER, urgent care clinic since your last visit? Hospitalized since your last visit? \" No    2. \"Have you seen or consulted any other health care providers outside of the [address] [place] [address] since your last visit? \" No     3. For patients aged 39-70: Has the patient had a colonoscopy / FIT/ Cologuard? Yes - no Care Gap present      If the patient is female:    4. For patients aged 41-77: Has the patient had a mammogram within the past 2 years? No      5. For patients aged 21-65: Has the patient had a pap smear?  No

## 2022-03-18 NOTE — PROGRESS NOTES
History and Physical    Patient: Dianne Powers MRN: [id number]  SSN: [id number]    YOB: 1955  Age: 74 y.o. Sex: male      Subjective:      Dianne Powers is a 74 y.o. male tension, hyperlipidemia is here for follow-up of type 2 diabetes mellitus. He was sent to me by primary care physician Dr. Jorje Gtz. Patient is present here with his wife today. Patient is a poor historian, and needs to be asked 1 question several times for him to understand and give answer ? Hard of hearing. At the last visit it was noted that patient was supposed to take Toujeo 80 units at night but he was hardly taking it. He was advised to stay off of it while we get labs to check for endogenous insulin production. He continues to take Janumet XR  mg once a day. Overall he takes oral medications more frequently but still not taking it regularly, including blood pressure medication. Since the last visit he has lost 5 pounds. He was educated about making some changes in his eating habits, is not drinking soda and juice anymore, trying to avoid sweets but he still drinking a lot of coffee with sugar, sweet tea throughout the day. He was trained on how to check blood glucose, he has started doing this, checks 2 times per week. Not taking blood pressure and cholesterol medications regularly. Glucometer reading: Checking blood glucose few times per week. Readings are all in 200s and 300s. Updated diabetes history:  · Diagnosis: > 10 years back  · Current treatment: Toujeo 80 units at night, Janumet XR  mg once a day   · Past treatment:   · Glucose checks   · Hyperglycemia:   · Hypoglycemia:   · Meals per day: 3, breakfast: eggs, dinner: junk food, snacks: soda, juice. Coffee.    · Exercise: walks  · DM related hospitalizations:     Smoking: yes  Family history of coronary artery disease/stroke: mother    Complications of DM:  · CAD: no  · CVA: no  · PVD: no  · Amputations: no   · Retinopathy: no; last exam was 2 years back  · Gastropathy: no  · Nephropathy: no  · Neuropathy: yes  Sees podiatrist:    Medications:  · Statin: atorvastatin 20 mg  · ACE-I: lisinopril  · ASA: yes    · Diabetes education: no    Past Medical History:   Diagnosis Date    Burning with urination     Cancer ([facility]     prostate    Colloid cyst of brain ([facility] 6/2011    S/P excision cyst [address]    Constipation     Diabetes ([facility]     IDDM    Diabetes ([facility]     Erectile dysfunction     GERD (gastroesophageal reflux disease)     Hyperlipidemia     Hypertension     Migraines      Past Surgical History:   Procedure Laterality Date    HX GI  2012    HX HEENT  2006    exc mass right neck    HX OTHER SURGICAL  2012    brain    HX UROLOGICAL      prostate bx    NEUROLOGICAL PROCEDURE UNLISTED  6-2011    removal cranial [facility]oid [facility] at [facility]      Family History   Problem Relation Age of Onset    Diabetes Mother     Heart Disease Mother     Stroke Mother     Heart Disease Father     Stroke Father     Hypertension Brother     Elevated Lipids Brother      Social History     Tobacco Use    Smoking status: Current Every Day Smoker     Packs/day: 0.50    Smokeless tobacco: Never Used    Tobacco comment: Started smoking at age 11   Substance Use Topics    Alcohol use: Not Currently      Prior to Admission medications    Medication Sig Start Date End Date Taking? Authorizing Provider   insulin glargine U-300 conc (Toujeo SoloStar U-300 Insulin) 300 unit/mL (1.5 mL) inpn pen Inject 20 units every morning 3/18/22  Yes Carlotta Pruett MD   SITagliptin-metFORMIN (Janumet XR) 100-1,000 mg TM24 Take 1 Tablet by mouth daily for 30 days. 3/18/22 4/17/22 Yes Carlotta Pruett MD   Insulin Needles, Disposable, 31 gauge x 5/16\" ndle Once a day 3/18/22  Yes Carlotta Pruett MD   ammonium lactate (AMLACTIN) 12 % topical cream Apply  to affected area two (2) times a day.  rub in to affected area well 3/3/22 Yes Jose Charlton, DPINDU   OneTouch Delica Plus Lancet 33 gauge misc USE TO TEST BLOOD GLUCOSE THREE TIMES DAILY 12/22/21  Yes Provider, Historical   lancets misc Test 3 times daily. Dx code E11.65  Insurance brand of choice 12/22/21  Yes Mara Grijalva, DO   Blood-Glucose Meter monitoring kit Finger stick glucose TID DX E11.65  Insurance brand of choice. 12/22/21  Yes Mara Grijalva, DO   glucose blood VI test strips (ASCENSIA AUTODISC VI, ONE TOUCH ULTRA TEST VI) strip Finger stick glucose TID DX E11.65  Insurance brand of choice. 12/22/21  Yes Mara Grijalva, DO   atorvastatin (LIPITOR) 20 mg tablet TAKE 1 TABLET BY MOUTH DAILY 11/8/21  Yes Mara Grijalva, DO   ibuprofen (MOTRIN) 600 mg tablet Take 1 Tablet by mouth every six (6) hours as needed for Pain. 6/8/21  Yes Catina Farris NP   lisinopriL (PRINIVIL, ZESTRIL) 40 mg tablet TAKE 1 TABLET BY MOUTH EVERY DAY 5/11/21  Yes Catina Farris NP   aspirin (Nino Chewable Aspirin) 81 mg chewable tablet Take 81 mg by mouth daily. Yes Provider, Historical   glucose blood VI test strips (ONETOUCH VERIO) strip Test 3 times daily. Dx code 250.00 4/29/15  Yes Yudy Santoyo MD        No Known Allergies    Review of Systems:  ROS    A comprehensive review of systems was preformed and it is negative except mentioned in HPI    Objective:     Vitals:    03/18/22 1258   BP: (!) 141/82   Pulse: (!) 118   Resp: 22   Temp: 98.6 °F (37 °C)   TempSrc: Oral   SpO2: 98%   Weight: 172 lb 1.6 oz (78.1 kg)   Height: 5' 9\" (1.753 m)        Physical Exam:    Physical Exam  Vitals and nursing note reviewed. Constitutional:       Appearance: Normal appearance. HENT:      Head: Normocephalic and atraumatic. Cardiovascular:      Rate and Rhythm: Normal rate and regular rhythm. Pulmonary:      Effort: Pulmonary effort is normal.      Breath sounds: Normal breath sounds. Neurological:      Mental Status: He is alert.      2-9-2022:  diabetic foot exam:  Bilateral diabetic foot exam was performed today. Dorsalis pedis pulses 1+ bilaterally. Monofilament sensation decreased bilaterally. Bilateral dry skin and calluses present. Labs and Imaging:    Last 3 Recorded Weights in this Encounter    03/18/22 1258   Weight: 172 lb 1.6 oz (78.1 kg)        Lab Results   Component Value Date/Time    Hemoglobin A1c 12.4 (H) 09/09/2021 11:49 AM    Hemoglobin A1c 10.5 (H) 06/08/2021 03:59 PM    Hemoglobin A1c 10.1 (H) 11/30/2020 01:26 PM    Hemoglobin A1c, External 11.0 01/17/2015 08:37 AM    Hemoglobin A1c, External 10.8 09/13/2014 09:37 AM        Assessment:     Patient Active Problem List   Diagnosis Code    Prostate cancer ([facility] C61    Hypertension I10    Hyperlipidemia E78.5    Erectile dysfunction N52.9    Diabetes ([facility] E11.9    Uncontrolled type 2 diabetes mellitus with hyperglycemia ([facility] E11.65    Benign essential HTN I10    Mixed hyperlipidemia E78.2    Nocturia R35.1    Smoker F17.200    Glycosuria R81           Plan:     Type 2 diabetes mellitus, uncontrolled:  Hemoglobin A1c was 10.5% on 6-8-2021, 12.4% on 9/9-2021, 11.9% on 2-9-2022. Fingerstick blood glucose is 477 mg/dL in my office today. Urine is negative for ketones. Up to date with diabetes related annual labs: Yes June 2021  Up to date with diabetic eye exam: No  2 -9-2022: Glucose 108, C-peptide 1.3 (1.1-4.4), alecia 65 undetectable, zinc autoantibodies undetectable, IA-2 undetectable  Plan:  It looks like patient has type 2 diabetes mellitus but endogenous insulin production is not sufficient. Restart Toujeo but we will only to 20 units every morning. I am going to switch him from taking insulin at night to morning so that he will remember to take it regularly. Discussed with patient importance of taking insulin every single day and not missing it. Increase Janumet XR from  mg to 100-1000 mg. And keeping it at once a day to help with compliance.   Check blood glucose once a day every day and I will see him back in 2 months with glucometer. Advised patient to start arranging his medications in a pillbox so he will take it regularly. Essential hypertension:  Blood pressure elevated today. No microalbuminuria, last checked in June 2021. Advised patient to take blood pressure medication regularly. Start arranging medications and pillbox. Pulse rate is high today. Cut back on caffeine. Mixed hyperlipidemia:  9-9-2021: Total cholesterol 143, triglycerides 166, LDL 81. Continue atorvastatin 20 mg regularly at bedtime. Bilateral foot calluses:  Patient was referred to podiatry at the last visit. Orders Placed This Encounter    AMB POC GLUCOSE BLOOD, BY GLUCOSE MONITORING DEVICE    AMB POC URINALYSIS DIP STICK AUTO W/O MICRO    insulin glargine U-300 conc (Toujeo SoloStar U-300 Insulin) 300 unit/mL (1.5 mL) inpn pen     Sig: Inject 20 units every morning     Dispense:  2 Adjustable Dose Pre-filled Pen Syringe     Refill:  3     Dose decrease    SITagliptin-metFORMIN (Janumet XR) 100-1,000 mg TM24     Sig: Take 1 Tablet by mouth daily for 30 days.      Dispense:  30 Tablet     Refill:  3     DC Janumet XR     Insulin Needles, Disposable, 31 gauge x 5/16\" ndle     Sig: Once a day     Dispense:  100 Each     Refill:  3        Signed By: Eda Palacio MD     March 18, 2022      Return to clinic 2 months

## 2022-03-19 PROBLEM — E78.2 MIXED HYPERLIPIDEMIA: Status: ACTIVE | Noted: 2022-02-09

## 2022-03-19 PROBLEM — E11.65 UNCONTROLLED TYPE 2 DIABETES MELLITUS WITH HYPERGLYCEMIA (HCC): Status: ACTIVE | Noted: 2022-02-09

## 2022-03-19 PROBLEM — R35.1 NOCTURIA: Status: ACTIVE | Noted: 2022-02-15

## 2022-03-19 PROBLEM — F17.200 SMOKER: Status: ACTIVE | Noted: 2022-02-15

## 2022-03-22 ENCOUNTER — OFFICE VISIT (OUTPATIENT)
Dept: FAMILY MEDICINE CLINIC | Age: 67
End: 2022-03-22
Payer: MEDICARE

## 2022-03-22 VITALS
WEIGHT: 172 LBS | TEMPERATURE: 98.3 F | OXYGEN SATURATION: 98 % | HEART RATE: 86 BPM | SYSTOLIC BLOOD PRESSURE: 129 MMHG | BODY MASS INDEX: 25.48 KG/M2 | RESPIRATION RATE: 18 BRPM | HEIGHT: 69 IN | DIASTOLIC BLOOD PRESSURE: 83 MMHG

## 2022-03-22 DIAGNOSIS — E11.65 TYPE 2 DIABETES MELLITUS WITH HYPERGLYCEMIA, WITH LONG-TERM CURRENT USE OF INSULIN (HCC): Primary | ICD-10-CM

## 2022-03-22 DIAGNOSIS — Z79.4 TYPE 2 DIABETES MELLITUS WITH HYPERGLYCEMIA, WITH LONG-TERM CURRENT USE OF INSULIN (HCC): Primary | ICD-10-CM

## 2022-03-22 DIAGNOSIS — E78.2 MIXED HYPERLIPIDEMIA: ICD-10-CM

## 2022-03-22 DIAGNOSIS — I10 PRIMARY HYPERTENSION: ICD-10-CM

## 2022-03-22 PROCEDURE — G8417 CALC BMI ABV UP PARAM F/U: HCPCS | Performed by: NURSE PRACTITIONER

## 2022-03-22 PROCEDURE — 3017F COLORECTAL CA SCREEN DOC REV: CPT | Performed by: NURSE PRACTITIONER

## 2022-03-22 PROCEDURE — G8427 DOCREV CUR MEDS BY ELIG CLIN: HCPCS | Performed by: NURSE PRACTITIONER

## 2022-03-22 PROCEDURE — G8432 DEP SCR NOT DOC, RNG: HCPCS | Performed by: NURSE PRACTITIONER

## 2022-03-22 PROCEDURE — 99214 OFFICE O/P EST MOD 30 MIN: CPT | Performed by: NURSE PRACTITIONER

## 2022-03-22 PROCEDURE — 1123F ACP DISCUSS/DSCN MKR DOCD: CPT | Performed by: NURSE PRACTITIONER

## 2022-03-22 PROCEDURE — 2022F DILAT RTA XM EVC RTNOPTHY: CPT | Performed by: NURSE PRACTITIONER

## 2022-03-22 PROCEDURE — 3046F HEMOGLOBIN A1C LEVEL >9.0%: CPT | Performed by: NURSE PRACTITIONER

## 2022-03-22 PROCEDURE — G8536 NO DOC ELDER MAL SCRN: HCPCS | Performed by: NURSE PRACTITIONER

## 2022-03-22 PROCEDURE — 1101F PT FALLS ASSESS-DOCD LE1/YR: CPT | Performed by: NURSE PRACTITIONER

## 2022-03-22 PROCEDURE — G8752 SYS BP LESS 140: HCPCS | Performed by: NURSE PRACTITIONER

## 2022-03-22 PROCEDURE — G8754 DIAS BP LESS 90: HCPCS | Performed by: NURSE PRACTITIONER

## 2022-03-22 RX ORDER — LISINOPRIL 40 MG/1
40 TABLET ORAL DAILY
Qty: 90 TABLET | Refills: 1 | Status: SHIPPED | OUTPATIENT
Start: 2022-03-22

## 2022-03-22 RX ORDER — ATORVASTATIN CALCIUM 20 MG/1
20 TABLET, FILM COATED ORAL DAILY
Qty: 90 TABLET | Refills: 3 | Status: SHIPPED | OUTPATIENT
Start: 2022-03-22 | End: 2022-08-30 | Stop reason: SDUPTHER

## 2022-03-22 NOTE — PROGRESS NOTES
Chief Complaint   Patient presents with    Diabetes     Visit Vitals  /83 (BP 1 Location: Right upper arm, BP Patient Position: Sitting, BP Cuff Size: Adult)   Pulse 86   Temp 98.3 °F (36.8 °C) (Temporal)   Resp 18   Ht 5' 9\" (1.753 m)   Wt 172 lb (78 kg)   SpO2 98%   BMI 25.40 kg/m²     Samantha Werner is a 74 y.o. male. HPI:  Patient is new to me and presented today to establish with me as new PCP. Former PCP Dr. Justin Waddell who has left the practice. Also needs medication refills today. Review of his record indicated medical history is significant for uncontrolled type 2 diabetes, hypertension, hyperlipidemia, ED, prostate cancer, tobacco dependence, GERD, migraines, and constipation. Uncontrolled type 2 diabetes- last A1c on 2/9/2022 was 11.9. He is under the care of endocrinologist Dr. Trent Archer and last appointment with her was on 3/18/2022. She did a number of lab tests which indicated that patient is not making any endogenous insulin. She did some medication changes. He is now taking Janumet 100-1000 mg daily and Toujeo insulin 20 units every morning because he was having trouble remembering to take it at night. He had not been checking his blood sugar and was instructed by Dr. Trent Archer to check it once a day. He is supposed to return to see her in 2 months. He stated he had prostate cancer 3 yrs ago. He does not remember who the urologist was. He had radiation- no prostatectomy. Review of his record indicated that he had an appointment with urologist Dr. Meghan Moss on 3/2/2022. Patient was supposed to do a PSA but so far has not done it. He was given [place] for urinary frequency by Dr. Mian Cr office but he did not use it except for 1 time. He told Dr. Meghan Moss he thought it helped him so Dr. Meghan Moss advised him to take it every day as ordered. Hyperlipidemia- last LDL on 9/9/2021 was 81 with triglycerides of 166.  His lab results were much improved over the 1 from 6/8/2021 at which time his triglycerides were 334. Current medication regime is atorvastatin 20 mg daily. Will adjust dose if indicated with next lab results. Hypertension- BP in office good at 129/83. He has had elevated BPs in other medical offices and has a history of not taking his BP medication. Tobacco dependence- patient continues to smoke about 1/2 pack of cigarettes per day for 53 years. Is not interested in making a tobacco cessation attempt at this time. Will discuss LDCT at next appointment due to time constraints today. Patient Active Problem List   Diagnosis Code    Prostate cancer ([facility] C61    Hypertension I10    Hyperlipidemia E78.5    Erectile dysfunction N52.9    Uncontrolled type 2 diabetes mellitus with hyperglycemia (HCC) E11.65    Benign essential HTN I10    Mixed hyperlipidemia E78.2    Nocturia R35.1    Tobacco dependence F17.200    Glycosuria R81     Past Medical History:   Diagnosis Date    Burning with urination     Cancer ([facility] [address]     prostate    Colloid cyst of brain ([facility] [address] 6/2011    S/P excision [address]    Constipation     Diabetes ([facility] [address]     IDDM    Diabetes ([facility] [address]     Erectile dysfunction     GERD (gastroesophageal reflux disease)     Hyperlipidemia     Hypertension     Migraines      Past Surgical History:   Procedure Laterality Date    HX GI  2012    HX HEENT  2006    exc mass right neck    HX OTHER SURGICAL  2012    brain    HX UROLOGICAL      prostate bx    NEUROLOGICAL PROCEDURE UNLISTED  6-2011    removal cranial colloid cyst at [facility]     Current Outpatient Medications   Medication Instructions    ammonium lactate (AMLACTIN) 12 % topical cream Topical, 2 TIMES DAILY, rub in to affected area well    aspirin (ELIJAH CHEWABLE ASPIRIN) 81 mg, Oral, DAILY    atorvastatin (LIPITOR) 20 mg, Oral, DAILY    Blood-Glucose Meter monitoring kit Finger stick glucose TID DX E11.65  Insurance brand of choice.     glucose blood VI test strips (ASCENSIA AUTODISC VI, ONE TOUCH ULTRA TEST VI) strip Finger stick glucose TID DX E11.65  Insurance brand of choice.  glucose blood VI test strips (ONETOUCH VERIO) strip Test 3 times daily. Dx code 250.00    ibuprofen (MOTRIN) 600 mg tablet TAKE 1 TABLET BY MOUTH EVERY 6 HOURS AS NEEDED FOR PAIN    insulin glargine U-300 conc (Toujeo SoloStar U-300 Insulin) 300 unit/mL (1.5 mL) inpn pen Inject 20 units every morning    Insulin Needles, Disposable, 31 gauge x 5/16\" ndle Once a day    Janumet -1,000 mg TM24 TAKE 1 TABLET BY MOUTH DAILY. STOP JANUMET XR     lancets misc Test 3 times daily. Dx code E11.65  Insurance brand of choice    lisinopriL (PRINIVIL, ZESTRIL) 40 mg, Oral, DAILY    OneTouch Delica Plus Lancet 33 gauge misc USE TO TEST BLOOD GLUCOSE THREE TIMES DAILY     No Known Allergies  Social History     Tobacco Use    Smoking status: Current Every Day Smoker     Packs/day: 0.50     Years: 53.00     Pack years: 26.50     Types: Cigarettes    Smokeless tobacco: Never Used    Tobacco comment: Started smoking at age 11   Vaping Use    Vaping Use: Never used   Substance Use Topics    Alcohol use: Not Currently    Drug use: Never     Family History   Problem Relation Age of Onset    Diabetes Mother     Heart Disease Mother     Stroke Mother     Heart Disease Father     Stroke Father     Hypertension Brother     Elevated Lipids Brother        Review of Systems   Constitutional: Negative for chills, fever and malaise/fatigue. HENT: Negative for congestion, ear pain and sore throat. Eyes: Positive for blurred vision. Respiratory: Negative for cough, shortness of breath and wheezing. Cardiovascular: Negative for chest pain, palpitations and leg swelling. Gastrointestinal: Positive for constipation. Negative for abdominal pain, diarrhea, heartburn, nausea and vomiting. Genitourinary: Negative for dysuria.         Hesitancy under care of urologist Dr. Edyta Gil Musculoskeletal: Negative for back pain, falls, joint pain, myalgias and neck pain. Neurological: Negative for dizziness, tingling, sensory change, weakness and headaches. Psychiatric/Behavioral: Negative for depression. The patient is not nervous/anxious and does not have insomnia. Objective  Physical Exam  Vitals reviewed. Constitutional:       General: He is not in acute distress. Appearance: Normal appearance. HENT:      Head: Normocephalic. Right Ear: External ear normal.      Left Ear: External ear normal.      Nose: Nose normal.   Eyes:      Conjunctiva/sclera: Conjunctivae normal.   Neck:      Vascular: No carotid bruit. Cardiovascular:      Rate and Rhythm: Normal rate and regular rhythm. Heart sounds: Normal heart sounds. No murmur heard. Pulmonary:      Effort: Pulmonary effort is normal. No respiratory distress. Breath sounds: Normal breath sounds. Musculoskeletal:         General: No swelling or tenderness. Normal range of motion. Cervical back: Neck supple. Right lower leg: No edema. Left lower leg: No edema. Skin:     General: Skin is warm and dry. Coloration: Skin is not jaundiced. Findings: No lesion or rash. Neurological:      Mental Status: He is alert and oriented to person, place, and time. Motor: No weakness. Gait: Gait normal.   Psychiatric:         Mood and Affect: Mood normal.         Behavior: Behavior normal.         Thought Content: Thought content normal.         Judgment: Judgment normal.       Assessment & Plan      ICD-10-CM ICD-9-CM    1. Type 2 diabetes mellitus with hyperglycemia, with long-term current use of insulin ([facility])  E11.65 250.00 Janumet -1,000 mg TM24    Z79.4 790.29      V58.67    2. Mixed hyperlipidemia  E78.2 272.2 atorvastatin (LIPITOR) 20 mg tablet   3. Primary hypertension  I10 401.9 lisinopriL (PRINIVIL, ZESTRIL) 40 mg tablet       1.  Type 2 diabetes mellitus with hyperglycemia, with long-term current use of insulin (HCC)    - Janumet -1,000 mg TM24; TAKE 1 TABLET BY MOUTH DAILY. STOP JANUMET XR     2. Mixed hyperlipidemia    - atorvastatin (LIPITOR) 20 mg tablet; Take 1 Tablet by mouth daily. Dispense: 90 Tablet; Refill: 3    3. Primary hypertension    - lisinopriL (PRINIVIL, ZESTRIL) 40 mg tablet; Take 1 Tablet by mouth daily. Dispense: 90 Tablet; Refill: 1    I have discussed the diagnosis with the patient and the intended plan as seen in the above orders. Pt/caretaker has expressed understanding. Questions were answered concerning future plans. I have discussed medication side effects and warnings as indicated with the patient as well.     Kristy Hamilton NP

## 2022-03-22 NOTE — PROGRESS NOTES
1. \"Have you been to the ER, urgent care clinic since your last visit? Hospitalized since your last visit? \" yes     2. \"Have you seen or consulted any other health care providers outside of the [address] [place] [address] since your last visit? \" {no    3. For patients aged 39-70: Has the patient had a colonoscopy / FIT/ Cologuard? Pt cant recall     If the patient is female:    4. For patients aged 41-77: Has the patient had a mammogram within the past 2 years? na      5.  For patients aged 21-65: Has the patient had a pap smear? na        Berenice Moss is a 74 y.o. male is coming in for with the following:     Chief Complaint   Patient presents with    Diabetes          With the following vitals:    Visit Vitals  /83 (BP 1 Location: Right upper arm, BP Patient Position: Sitting, BP Cuff Size: Adult)   Pulse 86   Temp 98.3 °F (36.8 °C) (Temporal)   Resp 18   Ht 5' 9\" (1.753 m)   Wt 172 lb (78 kg)   SpO2 98%   BMI 25.40 kg/m²

## 2022-05-02 NOTE — PROGRESS NOTES
HISTORY OF PRESENT ILLNESS  Az Boykin is a 74 y.o. male is here with cc of nocturia. He has a history of hyperlipidemia. DMII. No chief complaint on file. HPI  Chronic Conditions Addressed Today     1. Erectile dysfunction    2. Diabetes ([address] - Primary      Acute Diagnoses Addressed Today     Nocturia            Patient denies the symptoms of COVID-19 per routine screening guidelines. ROS    Past Medical History:  PMHx (including negatives):  has a past medical history of Cancer ([address] Colloid cyst of brain ([address] (6/2011), Constipation, Diabetes ([address] Diabetes ([address] Erectile dysfunction, GERD (gastroesophageal reflux disease), Hyperlipidemia, Hypertension, and Migraines. PSurgHx:  has a past surgical history that includes hx urological; hx heent (2006); neurological procedure unlisted (6-2011); hx other surgical (2012); and hx gi (2012). PSocHx:  reports that he has been smoking. He has been smoking about 0.50 packs per day. He has never used smokeless tobacco. He reports previous alcohol use. He reports that he does not use drugs. Home Medications    Medication Sig Start Date End Date Taking? Authorizing Provider   [facility] Del[facility] Lancet 33 gauge misc USE TO TEST BLOOD GLUCOSE THREE TIMES DAILY 12/22/21   Provider, Historical   lancets misc Test 3 times daily. Dx code E11.65  Insurance brand of choice 12/22/21   [place] Bobby, DO   insulin glargine (LANTUS,BASAGLAR) 100 unit/mL (3 mL) inpn 80 units subcutaneously daily 12/22/21   [place] Bobby, DO   Blood-Glucose Meter monitoring kit Finger stick glucose TID DX E11.65  Insurance brand of choice. 12/22/21   [facility]   glucose blood VI test strips (ASCENSIA AUTODISC VI, ONE TOUCH ULTRA TEST VI) strip Finger stick glucose TID DX E11.65  Insurance brand of choice.  12/22/21   [place] Bobby, DO   atorvastatin (LIPITOR) 20 mg tablet TAKE 1 TABLET BY MOUTH DAILY 11/8/21   Amber Bobby, DO   oxybutynin (DITROPAN) 5 mg tablet One tablet orally BID 9/10/21   Janet Ion, DO   ibuprofen (MOTRIN) 600 mg tablet Take 1 Tablet by mouth every six (6) hours as needed for Pain. 9/9/51   Catina Farris NP   lisinopriL (PRINIVIL, ZESTRIL) 40 mg tablet TAKE 1 TABLET BY MOUTH EVERY DAY 3/48/77   Catina Blake NP   SITagliptin-metFORMIN (Janumet XR)  mg TM24 Take 1 Tab by mouth two (2) times daily (with meals). To use with free trial offer and savings card 2/3/21   Janet Ion, DO   aspirin (Nino Chewable Aspirin) 81 mg chewable tablet Take 81 mg by mouth daily. Provider, Historical   glucose blood VI test strips (ONETOUCH VERIO) strip Test 3 times daily. Dx code 250.00 4/29/15   Cleveland Aly MD      Physical Exam    ASSESSMENT and PLAN  Diagnoses and all orders for this visit:    1. Type 2 diabetes mellitus without complication, without long-term current use of insulin ([facility] [address]    2. Erectile dysfunction, unspecified erectile dysfunction type    3.  Nocturia               Gerald Quiros NP

## 2022-05-16 DIAGNOSIS — R52 PAIN: ICD-10-CM

## 2022-05-17 RX ORDER — IBUPROFEN 600 MG/1
TABLET ORAL
Qty: 90 TABLET | Refills: 5 | Status: SHIPPED | OUTPATIENT
Start: 2022-05-17

## 2022-06-12 ENCOUNTER — TELEPHONE (OUTPATIENT)
Dept: FAMILY MEDICINE CLINIC | Age: 67
End: 2022-06-12

## 2022-06-12 DIAGNOSIS — E55.9 VITAMIN D DEFICIENCY: Primary | ICD-10-CM

## 2022-06-12 DIAGNOSIS — E11.65 UNCONTROLLED TYPE 2 DIABETES MELLITUS WITH HYPERGLYCEMIA (HCC): ICD-10-CM

## 2022-06-12 DIAGNOSIS — Z12.5 ENCOUNTER FOR SCREENING FOR MALIGNANT NEOPLASM OF PROSTATE: ICD-10-CM

## 2022-06-12 DIAGNOSIS — Z85.46 PERSONAL HISTORY OF PROSTATE CANCER: ICD-10-CM

## 2022-06-12 DIAGNOSIS — C61 PROSTATE CANCER (HCC): ICD-10-CM

## 2022-06-12 DIAGNOSIS — E78.00 PURE HYPERCHOLESTEROLEMIA: ICD-10-CM

## 2022-06-12 PROBLEM — F17.200 TOBACCO DEPENDENCE: Status: ACTIVE | Noted: 2022-02-15

## 2022-06-12 RX ORDER — SITAGLIPTIN AND METFORMIN HYDROCHLORIDE 100; 1000 MG/1; MG/1
TABLET, FILM COATED, EXTENDED RELEASE ORAL
COMMUNITY
Start: 2022-04-18 | End: 2022-09-09 | Stop reason: SDUPTHER

## 2022-06-13 NOTE — TELEPHONE ENCOUNTER
Called pt no answer left vm for pt to come in and complete labs this will week will try and call pt again. Also mobile number is not in service.

## 2022-06-13 NOTE — TELEPHONE ENCOUNTER
Patient does not have MyChart. Please call him and advise him to get fasting lab work done a week prior to coming in for his 6/22/2022 appointment with me. The lab orders are in. Only needs to go to the lab either in our office or in the community and the tech will look up the lab work. Thank you.

## 2022-06-13 NOTE — PROGRESS NOTES
Patient has scheduled appointment with me on 6/22/2022. Staff will be advised to call patient to let him know that I would like him to do his lab work fasting for at least 8 hours prior to coming in for his next appointment on 6/22/2022.

## 2022-08-11 ENCOUNTER — TELEPHONE (OUTPATIENT)
Dept: FAMILY MEDICINE CLINIC | Age: 67
End: 2022-08-11

## 2022-08-15 NOTE — TELEPHONE ENCOUNTER
Received call that patient needs a refill on his amoxicillian due infection in his mouth. Stated this has been ordered before and patient takes all the time.

## 2022-08-18 NOTE — TELEPHONE ENCOUNTER
Received call regarding this request. Caretaker reported that they are requesting this due to an abscess that patient had in mouth. They reported that it had been previously prescribed by Dr. Sherren Bonito. Notified her that it is not active on his meds list, and it may require an appt to prescribe, but we will defer to provider to further advise.

## 2022-08-19 ENCOUNTER — TELEPHONE (OUTPATIENT)
Dept: FAMILY MEDICINE CLINIC | Age: 67
End: 2022-08-19

## 2022-08-19 DIAGNOSIS — K12.2 ORAL INFECTION: ICD-10-CM

## 2022-08-19 RX ORDER — AMOXICILLIN 500 MG/1
CAPSULE ORAL
Qty: 30 CAPSULE | Refills: 1 | Status: ON HOLD | OUTPATIENT
Start: 2022-08-19 | End: 2022-09-01 | Stop reason: ALTCHOICE

## 2022-08-20 RX ORDER — AMOXICILLIN 250 MG/1
250 CAPSULE ORAL 3 TIMES DAILY
Qty: 30 CAPSULE | Refills: 0 | OUTPATIENT
Start: 2022-08-20 | End: 2022-08-30

## 2022-08-22 NOTE — TELEPHONE ENCOUNTER
Refill request done and refused in another encounter. Provider recommend the pt go to urgent care to be seen.  Will close out this encounter see sept enc for further documentation

## 2022-08-30 DIAGNOSIS — E78.2 MIXED HYPERLIPIDEMIA: ICD-10-CM

## 2022-08-30 RX ORDER — ATORVASTATIN CALCIUM 20 MG/1
20 TABLET, FILM COATED ORAL DAILY
Qty: 90 TABLET | Refills: 1 | Status: SHIPPED | OUTPATIENT
Start: 2022-08-30

## 2022-08-31 ENCOUNTER — APPOINTMENT (OUTPATIENT)
Dept: GENERAL RADIOLOGY | Age: 67
DRG: 871 | End: 2022-08-31
Attending: EMERGENCY MEDICINE
Payer: MEDICARE

## 2022-08-31 ENCOUNTER — HOSPITAL ENCOUNTER (INPATIENT)
Age: 67
LOS: 4 days | Discharge: HOME HEALTH | DRG: 871 | End: 2022-09-04
Attending: EMERGENCY MEDICINE | Admitting: HOSPITALIST
Payer: MEDICARE

## 2022-08-31 DIAGNOSIS — R55 SYNCOPE AND COLLAPSE: ICD-10-CM

## 2022-08-31 DIAGNOSIS — R73.9 HYPERGLYCEMIA: Primary | ICD-10-CM

## 2022-08-31 DIAGNOSIS — I21.4 NSTEMI (NON-ST ELEVATED MYOCARDIAL INFARCTION) (HCC): ICD-10-CM

## 2022-08-31 PROBLEM — E11.10 DKA (DIABETIC KETOACIDOSIS) (HCC): Status: ACTIVE | Noted: 2022-08-31

## 2022-08-31 PROBLEM — R79.89 ELEVATED TROPONIN: Status: ACTIVE | Noted: 2022-08-31

## 2022-08-31 PROBLEM — R77.8 ELEVATED TROPONIN: Status: ACTIVE | Noted: 2022-08-31

## 2022-08-31 LAB
ALBUMIN SERPL-MCNC: 3.4 G/DL (ref 3.5–5)
ALBUMIN/GLOB SERPL: 0.8 {RATIO} (ref 1.1–2.2)
ALP SERPL-CCNC: 82 U/L (ref 45–117)
ALT SERPL-CCNC: 22 U/L (ref 12–78)
ANION GAP SERPL CALC-SCNC: 10 MMOL/L (ref 5–15)
ANION GAP SERPL CALC-SCNC: 16 MMOL/L (ref 5–15)
ANION GAP SERPL CALC-SCNC: 18 MMOL/L (ref 5–15)
APPEARANCE UR: CLEAR
AST SERPL W P-5'-P-CCNC: 12 U/L (ref 15–37)
ATRIAL RATE: 112 BPM
BACTERIA URNS QL MICRO: NEGATIVE /HPF
BASOPHILS # BLD: 0.1 K/UL (ref 0–0.1)
BASOPHILS NFR BLD: 0 % (ref 0–1)
BILIRUB SERPL-MCNC: 0.8 MG/DL (ref 0.2–1)
BILIRUB UR QL: NEGATIVE
BUN SERPL-MCNC: 11 MG/DL (ref 6–20)
BUN SERPL-MCNC: 11 MG/DL (ref 6–20)
BUN SERPL-MCNC: 13 MG/DL (ref 6–20)
BUN/CREAT SERPL: 7 (ref 12–20)
BUN/CREAT SERPL: 7 (ref 12–20)
BUN/CREAT SERPL: 8 (ref 12–20)
CA-I BLD-MCNC: 8 MG/DL (ref 8.5–10.1)
CA-I BLD-MCNC: 8.1 MG/DL (ref 8.5–10.1)
CA-I BLD-MCNC: 9.3 MG/DL (ref 8.5–10.1)
CALCULATED P AXIS, ECG09: 44 DEGREES
CALCULATED R AXIS, ECG10: -73 DEGREES
CALCULATED T AXIS, ECG11: 47 DEGREES
CHLORIDE SERPL-SCNC: 100 MMOL/L (ref 97–108)
CHLORIDE SERPL-SCNC: 100 MMOL/L (ref 97–108)
CHLORIDE SERPL-SCNC: 90 MMOL/L (ref 97–108)
CO2 SERPL-SCNC: 17 MMOL/L (ref 21–32)
CO2 SERPL-SCNC: 20 MMOL/L (ref 21–32)
CO2 SERPL-SCNC: 25 MMOL/L (ref 21–32)
COLOR UR: YELLOW
CREAT SERPL-MCNC: 1.53 MG/DL (ref 0.7–1.3)
CREAT SERPL-MCNC: 1.55 MG/DL (ref 0.7–1.3)
CREAT SERPL-MCNC: 1.72 MG/DL (ref 0.7–1.3)
DIAGNOSIS, 93000: NORMAL
DIFFERENTIAL METHOD BLD: ABNORMAL
EOSINOPHIL # BLD: 0 K/UL (ref 0–0.4)
EOSINOPHIL NFR BLD: 0 % (ref 0–7)
EPITH CASTS URNS QL MICRO: NORMAL /LPF
ERYTHROCYTE [DISTWIDTH] IN BLOOD BY AUTOMATED COUNT: 12.3 % (ref 11.5–14.5)
GLOBULIN SER CALC-MCNC: 4.5 G/DL (ref 2–4)
GLUCOSE BLD STRIP.AUTO-MCNC: 314 MG/DL (ref 65–100)
GLUCOSE SERPL-MCNC: 278 MG/DL (ref 65–100)
GLUCOSE SERPL-MCNC: 342 MG/DL (ref 65–100)
GLUCOSE SERPL-MCNC: 624 MG/DL (ref 65–100)
GLUCOSE UR STRIP.AUTO-MCNC: >1000 MG/DL
HCT VFR BLD AUTO: 42 % (ref 36.6–50.3)
HGB BLD-MCNC: 14.5 G/DL (ref 12.1–17)
HGB UR QL STRIP: ABNORMAL
IMM GRANULOCYTES # BLD AUTO: 0.1 K/UL (ref 0–0.04)
IMM GRANULOCYTES NFR BLD AUTO: 1 % (ref 0–0.5)
KETONES UR QL STRIP.AUTO: 15 MG/DL
LEUKOCYTE ESTERASE UR QL STRIP.AUTO: NEGATIVE
LYMPHOCYTES # BLD: 1.2 K/UL (ref 0.8–3.5)
LYMPHOCYTES NFR BLD: 7 % (ref 12–49)
MCH RBC QN AUTO: 29.1 PG (ref 26–34)
MCHC RBC AUTO-ENTMCNC: 34.5 G/DL (ref 30–36.5)
MCV RBC AUTO: 84.3 FL (ref 80–99)
MONOCYTES # BLD: 1.9 K/UL (ref 0–1)
MONOCYTES NFR BLD: 10 % (ref 5–13)
NEUTS SEG # BLD: 15.3 K/UL (ref 1.8–8)
NEUTS SEG NFR BLD: 82 % (ref 32–75)
NITRITE UR QL STRIP.AUTO: NEGATIVE
P-R INTERVAL, ECG05: 124 MS
PERFORMED BY, TECHID: ABNORMAL
PH UR STRIP: 6 [PH] (ref 5–8)
PLATELET # BLD AUTO: 221 K/UL (ref 150–400)
PMV BLD AUTO: 11 FL (ref 8.9–12.9)
POTASSIUM SERPL-SCNC: 3.9 MMOL/L (ref 3.5–5.1)
POTASSIUM SERPL-SCNC: 3.9 MMOL/L (ref 3.5–5.1)
POTASSIUM SERPL-SCNC: 4.1 MMOL/L (ref 3.5–5.1)
PROT SERPL-MCNC: 7.9 G/DL (ref 6.4–8.2)
PROT UR STRIP-MCNC: NEGATIVE MG/DL
Q-T INTERVAL, ECG07: 346 MS
QRS DURATION, ECG06: 132 MS
QTC CALCULATION (BEZET), ECG08: 472 MS
RBC # BLD AUTO: 4.98 M/UL (ref 4.1–5.7)
RBC #/AREA URNS HPF: NORMAL /HPF (ref 0–5)
SODIUM SERPL-SCNC: 128 MMOL/L (ref 136–145)
SODIUM SERPL-SCNC: 133 MMOL/L (ref 136–145)
SODIUM SERPL-SCNC: 135 MMOL/L (ref 136–145)
SP GR UR REFRACTOMETRY: 1 (ref 1–1.03)
TROPONIN-HIGH SENSITIVITY: 265 NG/L (ref 0–76)
TROPONIN-HIGH SENSITIVITY: 392 NG/L (ref 0–76)
UROBILINOGEN UR QL STRIP.AUTO: 0.1 EU/DL (ref 0.2–1)
VENTRICULAR RATE, ECG03: 112 BPM
WBC # BLD AUTO: 18.6 K/UL (ref 4.1–11.1)
WBC URNS QL MICRO: NORMAL /HPF (ref 0–4)

## 2022-08-31 PROCEDURE — 74011250636 HC RX REV CODE- 250/636: Performed by: HOSPITALIST

## 2022-08-31 PROCEDURE — 99285 EMERGENCY DEPT VISIT HI MDM: CPT

## 2022-08-31 PROCEDURE — 74011636637 HC RX REV CODE- 636/637: Performed by: EMERGENCY MEDICINE

## 2022-08-31 PROCEDURE — 81001 URINALYSIS AUTO W/SCOPE: CPT

## 2022-08-31 PROCEDURE — 74011250637 HC RX REV CODE- 250/637: Performed by: EMERGENCY MEDICINE

## 2022-08-31 PROCEDURE — 36415 COLL VENOUS BLD VENIPUNCTURE: CPT

## 2022-08-31 PROCEDURE — 80048 BASIC METABOLIC PNL TOTAL CA: CPT

## 2022-08-31 PROCEDURE — 96360 HYDRATION IV INFUSION INIT: CPT

## 2022-08-31 PROCEDURE — 74011250636 HC RX REV CODE- 250/636: Performed by: EMERGENCY MEDICINE

## 2022-08-31 PROCEDURE — 93005 ELECTROCARDIOGRAM TRACING: CPT

## 2022-08-31 PROCEDURE — 74011636637 HC RX REV CODE- 636/637: Performed by: HOSPITALIST

## 2022-08-31 PROCEDURE — 65270000029 HC RM PRIVATE

## 2022-08-31 PROCEDURE — 96361 HYDRATE IV INFUSION ADD-ON: CPT

## 2022-08-31 PROCEDURE — 80053 COMPREHEN METABOLIC PANEL: CPT

## 2022-08-31 PROCEDURE — 71045 X-RAY EXAM CHEST 1 VIEW: CPT

## 2022-08-31 PROCEDURE — 82962 GLUCOSE BLOOD TEST: CPT

## 2022-08-31 PROCEDURE — 84484 ASSAY OF TROPONIN QUANT: CPT

## 2022-08-31 PROCEDURE — 85025 COMPLETE CBC W/AUTO DIFF WBC: CPT

## 2022-08-31 RX ORDER — INSULIN GLARGINE 100 [IU]/ML
15 INJECTION, SOLUTION SUBCUTANEOUS
Status: COMPLETED | OUTPATIENT
Start: 2022-08-31 | End: 2022-08-31

## 2022-08-31 RX ORDER — INSULIN LISPRO 100 [IU]/ML
10 INJECTION, SOLUTION INTRAVENOUS; SUBCUTANEOUS ONCE
Status: COMPLETED | OUTPATIENT
Start: 2022-08-31 | End: 2022-08-31

## 2022-08-31 RX ORDER — ASPIRIN 325 MG
325 TABLET ORAL
Status: COMPLETED | OUTPATIENT
Start: 2022-08-31 | End: 2022-08-31

## 2022-08-31 RX ADMIN — INSULIN HUMAN 8 UNITS: 100 INJECTION, SOLUTION PARENTERAL at 17:54

## 2022-08-31 RX ADMIN — INSULIN GLARGINE 15 UNITS: 100 INJECTION, SOLUTION SUBCUTANEOUS at 20:01

## 2022-08-31 RX ADMIN — SODIUM CHLORIDE 1000 ML: 9 INJECTION, SOLUTION INTRAVENOUS at 17:33

## 2022-08-31 RX ADMIN — INSULIN LISPRO 10 UNITS: 100 INJECTION, SOLUTION INTRAVENOUS; SUBCUTANEOUS at 20:01

## 2022-08-31 RX ADMIN — SODIUM CHLORIDE 1000 ML: 9 INJECTION, SOLUTION INTRAVENOUS at 18:22

## 2022-08-31 RX ADMIN — SODIUM CHLORIDE 1000 ML: 9 INJECTION, SOLUTION INTRAVENOUS at 20:00

## 2022-08-31 RX ADMIN — ASPIRIN 325 MG ORAL TABLET 325 MG: 325 PILL ORAL at 17:36

## 2022-08-31 NOTE — ED TRIAGE NOTES
Stood up and fell this am, states he got dizzy when he stands up, wife states he may be disoriented, voiding a lot. States no loc, and didn't hit head.   Per Wife pt is non compliant with his medications and doesn't check blood sugars

## 2022-08-31 NOTE — ED PROVIDER NOTES
[facility]ICAL [facility]AM      Date: 8/31/2022  Patient Name: Rosie Gosselin    History of Presenting Illness     Chief Complaint   Patient presents with    Dizziness    Fall       History Provided By: Patient    HPI: Rosie Gosselin, 75-year-old male with history of diabetes here with fatigue, lightheadedness, generalized weakness. This is been going on for few days. Denies any chest pain, shortness of breath, nausea, vomiting, diarrhea. He has been having some urinary urgency and frequency. patient had a near syncopal episode on standing this morning. He fell but did not hit his head. No chest pain or shortness of breath. Patient has history of prostatectomy. History of issues with nocturia and poor follow-up with his urologist.  Denies any back pain, numbness, weakness. Patient was recently switched to Toujeo insulin pen from previously taking 3 medications. However he has not been taking that medication because he is tired of pricking himself to check his blood sugar. There are no other complaints, changes, or physical findings at this time. PCP: Dylan Morgan NP    No current facility-administered medications on file prior to encounter. Current Outpatient Medications on File Prior to Encounter   Medication Sig Dispense Refill    atorvastatin (LIPITOR) 20 mg tablet Take 1 Tablet by mouth daily. 90 Tablet 1    Janumet -1,000 mg TM24 TAKE 1 TABLET BY MOUTH DAILY. STOP JANUMET XR       lisinopriL (PRINIVIL, ZESTRIL) 40 mg tablet Take 1 Tablet by mouth daily. 90 Tablet 1    aspirin 81 mg chewable tablet Take 81 mg by mouth daily.       amoxicillin (AMOXIL) 500 mg capsule TAKE ONE CAPSULE BY MOUTH THREE TIMES DAILY FOR 10 DAYS 30 Capsule 1    ibuprofen (MOTRIN) 600 mg tablet TAKE 1 TABLET BY MOUTH EVERY 6 HOURS AS NEEDED FOR PAIN 90 Tablet 5    insulin glargine U-300 conc (Toujeo SoloStar U-300 Insulin) 300 unit/mL (1.5 mL) inpn pen Inject 20 units every morning (Patient not taking: Reported on 8/31/2022) 2 Adjustable Dose Pre-filled Pen Syringe 3    Insulin Needles, Disposable, 31 gauge x 5/16\" ndle Once a day 100 Each 3    ammonium lactate (AMLACTIN) 12 % topical cream Apply  to affected area two (2) times a day. rub in to affected area well 280 g 3    OneTouch Delica Plus Lancet 33 gauge misc USE TO TEST BLOOD GLUCOSE THREE TIMES DAILY      lancets misc Test 3 times daily. Dx code E11.65  Insurance brand of choice 300 Each 6    Blood-Glucose Meter monitoring kit Finger stick glucose TID DX E11.65  Insurance brand of choice. 1 Kit 0    glucose blood VI test strips (ASCENSIA AUTODISC VI, ONE TOUCH ULTRA TEST VI) strip Finger stick glucose TID DX E11.65  Insurance brand of choice. 300 Strip 3    glucose blood VI test strips (ONETOUCH VERIO) strip Test 3 times daily.  Dx code 250.00 100 Strip 6       Past History     Past Medical History:  Past Medical History:   Diagnosis Date    Burning with urination     Cancer ([facility]     prostate    Colloid cyst of brain ([facility] 6/2011    S/P excision cyst at [address]    Constipation     Diabetes ([facility]     IDDM    Diabetes ([facility]     Erectile dysfunction     GERD (gastroesophageal reflux disease)     Hyperlipidemia     Hypertension     Migraines        Past Surgical History:  Past Surgical History:   Procedure Laterality Date    HX GI  2012    HX HEENT  2006    exc mass right neck    HX OTHER SURGICAL  2012    brain    HX UROLOGICAL      prostate bx    NEUROLOGICAL PROCEDURE UNLISTED  6-2011    removal cranial colloid cyst at [facility]       Family History:  Family History   Problem Relation Age of Onset    Diabetes Mother     Heart Disease Mother     Stroke Mother     Heart Disease Father     Stroke Father     Hypertension Brother     Elevated Lipids Brother        Social History:  Social History     Tobacco Use    Smoking status: Every Day     Packs/day: 0.50     Years: 53.00     Pack years: 26.50     Types: Cigarettes    Smokeless tobacco: Never    Tobacco comments:     Started smoking at age 11   Vaping Use    Vaping Use: Never used   Substance Use Topics    Alcohol use: Not Currently    Drug use: Never       Allergies:  No Known Allergies    Review of Systems   Review of Systems   Constitutional:  Negative for chills and fever. HENT:  Negative for sore throat. Eyes:  Negative for redness. Respiratory:  Negative for shortness of breath. Cardiovascular:  Negative for chest pain. Gastrointestinal:  Negative for abdominal pain, nausea and vomiting. Genitourinary:  Positive for dysuria and urgency. Negative for flank pain. Musculoskeletal:  Negative for back pain and myalgias. Skin:  Negative for rash. Neurological:  Negative for weakness, numbness and headaches. Physical Exam   Physical Exam  Vitals and nursing note reviewed. Constitutional:       General: He is not in acute distress. Appearance: Normal appearance. HENT:      Head: Normocephalic and atraumatic. Mouth/Throat:      Mouth: Mucous membranes are moist.   Eyes:      Extraocular Movements: Extraocular movements intact. Conjunctiva/sclera: Conjunctivae normal.      Pupils: Pupils are equal, round, and reactive to light. Cardiovascular:      Rate and Rhythm: Normal rate and regular rhythm. Pulmonary:      Effort: Pulmonary effort is normal. No respiratory distress. Breath sounds: Normal breath sounds. No wheezing, rhonchi or rales. Abdominal:      General: There is no distension. Palpations: Abdomen is soft. Tenderness: There is no abdominal tenderness. Musculoskeletal:         General: Normal range of motion. Cervical back: Normal range of motion. Comments: No midline cervical, thoracic or lumbar tenderness to palpation   Skin:     General: Skin is warm and dry. Neurological:      General: No focal deficit present. Mental Status: He is alert and oriented to person, place, and time. Mental status is at baseline. Comments: 5/5 strength of bilateral upper and lower extremities       Lab and Diagnostic Study Results   Labs -     Recent Results (from the past 12 hour(s))   CBC WITH AUTOMATED DIFF    Collection Time: 08/31/22  4:55 PM   Result Value Ref Range    WBC 18.6 (H) 4.1 - 11.1 K/uL    RBC 4.98 4.10 - 5.70 M/uL    HGB 14.5 12.1 - 17.0 g/dL    HCT 42.0 36.6 - 50.3 %    MCV 84.3 80.0 - 99.0 FL    MCH 29.1 26.0 - 34.0 PG    MCHC 34.5 30.0 - 36.5 g/dL    RDW 12.3 11.5 - 14.5 %    PLATELET 566 843 - 915 K/uL    MPV 11.0 8.9 - 12.9 FL    NEUTROPHILS 82 (H) 32 - 75 %    LYMPHOCYTES 7 (L) 12 - 49 %    MONOCYTES 10 5 - 13 %    EOSINOPHILS 0 0 - 7 %    BASOPHILS 0 0 - 1 %    IMMATURE GRANULOCYTES 1 (H) 0.0 - 0.5 %    ABS. NEUTROPHILS 15.3 (H) 1.8 - 8.0 K/UL    ABS. LYMPHOCYTES 1.2 0.8 - 3.5 K/UL    ABS. MONOCYTES 1.9 (H) 0.0 - 1.0 K/UL    ABS. EOSINOPHILS 0.0 0.0 - 0.4 K/UL    ABS. BASOPHILS 0.1 0.0 - 0.1 K/UL    ABS. IMM. GRANS. 0.1 (H) 0.00 - 0.04 K/UL    DF AUTOMATED         Radiologic Studies -   @lastxrresult@  CT Results  (Last 48 hours)      None          CXR Results  (Last 48 hours)                 08/31/22 1703  XR CHEST PORT Final result    Impression:  No acute cardiopulmonary process. Narrative:  EXAM: XR CHEST PORT       HISTORY: dizziness. COMPARISON: 7/21/2011       FINDINGS: Single view(s) of the chest. The lungs are well inflated. No focal   consolidation, pleural effusion, or pneumothorax. The cardiomediastinal   silhouette is unremarkable. The visualized osseous structures are unremarkable. Medical Decision Making and ED Course   Differential Diagnosis & Medical Decision Making Provider Note:   53-year-old male with history of diabetes presenting with fatigue, weakness. He has been noncompliant with his medications.   Labs show leukocytosis of 18.6 however at this time no obvious source of infection with normal urine, clear chest x-ray, no infectious signs or symptoms. His labs primarily show hyperglycemia with mild signs of DKA, CO2 20, anion gap of 18. He was given 2 L of IV fluids and 8 units of IV insulin and labs will be rechecked for transfer to main hospital, ICU versus floor admission. Troponin noted to be elevated at 265. No acute EKG changes. Patient will globin full dose aspirin, repeat pending. Discusssed with  Oncoming physician, Dr. Stepan Henderson for follow-up of these labs and transfer    - I am the first provider for this patient. I reviewed the vital signs, available nursing notes, past medical history, past surgical history, family history and social history. The patients presenting problems have been discussed, and they are in agreement with the care plan formulated and outlined with them. I have encouraged them to ask questions as they arise throughout their visit. Vital Signs-Reviewed the patient's vital signs. Patient Vitals for the past 12 hrs:   Temp Pulse Resp BP SpO2   08/31/22 1658 98.1 °F (36.7 °C) (!) 112 18 (!) 178/79 100 %       ED Course:   ED Course as of 09/01/22 1016   Wed Aug 31, 2022   1735 Troponin-High Sensitivity(!!): 265 [KK]   1735 WBC(!): 18.6 [KK]   1736 Creatinine(!): 1.72  ERASTO [KK]   1736 Sinus tach rate 112, normal axis, right bundle branch block, LAFB, no ST elevations or depressions,  [KK]   1747 XR CHEST PORT  negative [KK]   1747 Troponin-High Sensitivity(!!): 265  No acute ecg changes, no chest pain or SOB [KK]   1849 Glucose(!): >1,000  No evidence of UTI [KK]      ED Course User Index  [KK] Ruthy Vaqzuez MD         Disposition   Disposition: Transferr pending    Diagnosis/Clinical Impression     Clinical Impression:   1. Hyperglycemia    2. Syncope and collapse    3. NSTEMI (non-ST elevated myocardial infarction) [facility])        Attestations: INida MD, am the primary clinician of record.     Please note that this dictation was completed with [employer]er[employer], the computer voice recognition software. Quite often unanticipated grammatical, syntax, homophones, and other interpretive errors are inadvertently transcribed by the computer software. Please disregard these errors. Please excuse any errors that have escaped final proofreading. Thank you.

## 2022-08-31 NOTE — Clinical Note
Status[de-identified] INPATIENT [101]   Type of Bed: Remote Telemetry [29]   Cardiac Monitoring Required?: Yes   Inpatient Hospitalization Certified Necessary for the Following Reasons: 3.  Patient receiving treatment that can only be provided in an inpatient setting (further clarification in H&P documentation)   Admitting Diagnosis: DKA (diabetic ketoacidosis) ([facility] [648145]   Admitting Diagnosis: Elevated troponin [5865860]   Admitting Physician: Mari Harmon [[id number]]   Attending Physician: Mari Harmon [[id number]]   Estimated Length of Stay: 2 Midnights   Discharge Plan[de-identified] Home with Office Follow-up

## 2022-08-31 NOTE — ED NOTES
Pt was cleaned, while giving urine sample, soiled his underwear and and bed.   Cleaned pt and changed bed linen

## 2022-09-01 ENCOUNTER — APPOINTMENT (OUTPATIENT)
Dept: CT IMAGING | Age: 67
DRG: 871 | End: 2022-09-01
Attending: INTERNAL MEDICINE
Payer: MEDICARE

## 2022-09-01 LAB
ALBUMIN SERPL-MCNC: 2.6 G/DL (ref 3.5–5)
ANION GAP SERPL CALC-SCNC: 8 MMOL/L (ref 5–15)
BASOPHILS # BLD: 0.1 K/UL (ref 0–0.1)
BASOPHILS NFR BLD: 0 % (ref 0–1)
BUN SERPL-MCNC: 12 MG/DL (ref 6–20)
BUN/CREAT SERPL: 9 (ref 12–20)
CA-I BLD-MCNC: 8.4 MG/DL (ref 8.5–10.1)
CHLORIDE SERPL-SCNC: 102 MMOL/L (ref 97–108)
CO2 SERPL-SCNC: 22 MMOL/L (ref 21–32)
COVID-19 RAPID TEST, COVR: NOT DETECTED
CREAT SERPL-MCNC: 1.35 MG/DL (ref 0.7–1.3)
CRP SERPL-MCNC: 23.3 MG/DL (ref 0–0.6)
DIFFERENTIAL METHOD BLD: ABNORMAL
EOSINOPHIL # BLD: 0 K/UL (ref 0–0.4)
EOSINOPHIL NFR BLD: 0 % (ref 0–7)
ERYTHROCYTE [DISTWIDTH] IN BLOOD BY AUTOMATED COUNT: 12.5 % (ref 11.5–14.5)
FLUAV AG NPH QL IA: NEGATIVE
FLUBV AG NOSE QL IA: NEGATIVE
GLUCOSE BLD STRIP.AUTO-MCNC: 202 MG/DL (ref 65–100)
GLUCOSE BLD STRIP.AUTO-MCNC: 202 MG/DL (ref 65–100)
GLUCOSE BLD STRIP.AUTO-MCNC: 250 MG/DL (ref 65–100)
GLUCOSE BLD STRIP.AUTO-MCNC: 285 MG/DL (ref 65–100)
GLUCOSE SERPL-MCNC: 260 MG/DL (ref 65–100)
HCT VFR BLD AUTO: 36.2 % (ref 36.6–50.3)
HGB BLD-MCNC: 12 G/DL (ref 12.1–17)
IMM GRANULOCYTES # BLD AUTO: 0.2 K/UL (ref 0–0.04)
IMM GRANULOCYTES NFR BLD AUTO: 1 % (ref 0–0.5)
LACTATE SERPL-SCNC: 2.2 MMOL/L (ref 0.4–2)
LYMPHOCYTES # BLD: 1.4 K/UL (ref 0.8–3.5)
LYMPHOCYTES NFR BLD: 8 % (ref 12–49)
MCH RBC QN AUTO: 28.9 PG (ref 26–34)
MCHC RBC AUTO-ENTMCNC: 33.1 G/DL (ref 30–36.5)
MCV RBC AUTO: 87.2 FL (ref 80–99)
MONOCYTES # BLD: 1.7 K/UL (ref 0–1)
MONOCYTES NFR BLD: 9 % (ref 5–13)
NEUTS SEG # BLD: 14.8 K/UL (ref 1.8–8)
NEUTS SEG NFR BLD: 82 % (ref 32–75)
NRBC # BLD: 0 K/UL (ref 0–0.01)
NRBC BLD-RTO: 0 PER 100 WBC
PERFORMED BY, TECHID: ABNORMAL
PHOSPHATE SERPL-MCNC: 1.5 MG/DL (ref 2.6–4.7)
PLATELET # BLD AUTO: 203 K/UL (ref 150–400)
PMV BLD AUTO: 12.2 FL (ref 8.9–12.9)
POTASSIUM SERPL-SCNC: 3.9 MMOL/L (ref 3.5–5.1)
PROCALCITONIN SERPL-MCNC: 1.92 NG/ML
RBC # BLD AUTO: 4.15 M/UL (ref 4.1–5.7)
SODIUM SERPL-SCNC: 132 MMOL/L (ref 136–145)
TROPONIN-HIGH SENSITIVITY: 428 NG/L (ref 0–76)
TSH SERPL DL<=0.05 MIU/L-ACNC: 0.54 UIU/ML (ref 0.36–3.74)
WBC # BLD AUTO: 18 K/UL (ref 4.1–11.1)

## 2022-09-01 PROCEDURE — 83605 ASSAY OF LACTIC ACID: CPT

## 2022-09-01 PROCEDURE — 74011250637 HC RX REV CODE- 250/637: Performed by: HOSPITALIST

## 2022-09-01 PROCEDURE — 87040 BLOOD CULTURE FOR BACTERIA: CPT

## 2022-09-01 PROCEDURE — 87635 SARS-COV-2 COVID-19 AMP PRB: CPT

## 2022-09-01 PROCEDURE — 80069 RENAL FUNCTION PANEL: CPT

## 2022-09-01 PROCEDURE — 74011250637 HC RX REV CODE- 250/637: Performed by: INTERNAL MEDICINE

## 2022-09-01 PROCEDURE — 97161 PT EVAL LOW COMPLEX 20 MIN: CPT

## 2022-09-01 PROCEDURE — 84484 ASSAY OF TROPONIN QUANT: CPT

## 2022-09-01 PROCEDURE — 65270000029 HC RM PRIVATE

## 2022-09-01 PROCEDURE — 74011250636 HC RX REV CODE- 250/636: Performed by: HOSPITALIST

## 2022-09-01 PROCEDURE — 82962 GLUCOSE BLOOD TEST: CPT

## 2022-09-01 PROCEDURE — 87804 INFLUENZA ASSAY W/OPTIC: CPT

## 2022-09-01 PROCEDURE — 85025 COMPLETE CBC W/AUTO DIFF WBC: CPT

## 2022-09-01 PROCEDURE — 36415 COLL VENOUS BLD VENIPUNCTURE: CPT

## 2022-09-01 PROCEDURE — 86140 C-REACTIVE PROTEIN: CPT

## 2022-09-01 PROCEDURE — 84443 ASSAY THYROID STIM HORMONE: CPT

## 2022-09-01 PROCEDURE — 84145 PROCALCITONIN (PCT): CPT

## 2022-09-01 PROCEDURE — 74011000250 HC RX REV CODE- 250: Performed by: HOSPITALIST

## 2022-09-01 PROCEDURE — 74011636637 HC RX REV CODE- 636/637: Performed by: HOSPITALIST

## 2022-09-01 PROCEDURE — 97116 GAIT TRAINING THERAPY: CPT

## 2022-09-01 PROCEDURE — 74011250636 HC RX REV CODE- 250/636: Performed by: INTERNAL MEDICINE

## 2022-09-01 PROCEDURE — 74011636637 HC RX REV CODE- 636/637: Performed by: INTERNAL MEDICINE

## 2022-09-01 PROCEDURE — 71250 CT THORAX DX C-: CPT

## 2022-09-01 RX ORDER — ONDANSETRON 4 MG/1
4 TABLET, ORALLY DISINTEGRATING ORAL
Status: DISCONTINUED | OUTPATIENT
Start: 2022-09-01 | End: 2022-09-04 | Stop reason: HOSPADM

## 2022-09-01 RX ORDER — MAGNESIUM SULFATE 100 %
4 CRYSTALS MISCELLANEOUS AS NEEDED
Status: DISCONTINUED | OUTPATIENT
Start: 2022-09-01 | End: 2022-09-04 | Stop reason: HOSPADM

## 2022-09-01 RX ORDER — METFORMIN HYDROCHLORIDE 500 MG/1
1000 TABLET ORAL DAILY
Status: DISCONTINUED | OUTPATIENT
Start: 2022-09-01 | End: 2022-09-04 | Stop reason: HOSPADM

## 2022-09-01 RX ORDER — ACETAMINOPHEN 325 MG/1
650 TABLET ORAL
Status: DISCONTINUED | OUTPATIENT
Start: 2022-09-01 | End: 2022-09-04 | Stop reason: HOSPADM

## 2022-09-01 RX ORDER — ALOGLIPTIN 12.5 MG/1
12.5 TABLET, FILM COATED ORAL DAILY
Status: DISCONTINUED | OUTPATIENT
Start: 2022-09-01 | End: 2022-09-04 | Stop reason: HOSPADM

## 2022-09-01 RX ORDER — DEXTROSE MONOHYDRATE 100 MG/ML
0-250 INJECTION, SOLUTION INTRAVENOUS AS NEEDED
Status: DISCONTINUED | OUTPATIENT
Start: 2022-09-01 | End: 2022-09-04 | Stop reason: HOSPADM

## 2022-09-01 RX ORDER — SODIUM CHLORIDE, SODIUM LACTATE, POTASSIUM CHLORIDE, CALCIUM CHLORIDE 600; 310; 30; 20 MG/100ML; MG/100ML; MG/100ML; MG/100ML
125 INJECTION, SOLUTION INTRAVENOUS CONTINUOUS
Status: DISPENSED | OUTPATIENT
Start: 2022-09-01 | End: 2022-09-01

## 2022-09-01 RX ORDER — GUAIFENESIN 100 MG/5ML
81 LIQUID (ML) ORAL DAILY
Status: DISCONTINUED | OUTPATIENT
Start: 2022-09-01 | End: 2022-09-04 | Stop reason: HOSPADM

## 2022-09-01 RX ORDER — INSULIN LISPRO 100 [IU]/ML
INJECTION, SOLUTION INTRAVENOUS; SUBCUTANEOUS
Status: DISCONTINUED | OUTPATIENT
Start: 2022-09-01 | End: 2022-09-04 | Stop reason: HOSPADM

## 2022-09-01 RX ORDER — DOXYCYCLINE 100 MG/1
100 CAPSULE ORAL EVERY 12 HOURS
Status: DISCONTINUED | OUTPATIENT
Start: 2022-09-01 | End: 2022-09-04 | Stop reason: HOSPADM

## 2022-09-01 RX ORDER — ONDANSETRON 2 MG/ML
4 INJECTION INTRAMUSCULAR; INTRAVENOUS
Status: DISCONTINUED | OUTPATIENT
Start: 2022-09-01 | End: 2022-09-04 | Stop reason: HOSPADM

## 2022-09-01 RX ORDER — INSULIN GLARGINE 100 [IU]/ML
15 INJECTION, SOLUTION SUBCUTANEOUS
Refills: 3 | Status: DISCONTINUED | OUTPATIENT
Start: 2022-09-01 | End: 2022-09-01

## 2022-09-01 RX ORDER — INSULIN GLARGINE 100 [IU]/ML
20 INJECTION, SOLUTION SUBCUTANEOUS
Status: DISCONTINUED | OUTPATIENT
Start: 2022-09-01 | End: 2022-09-04 | Stop reason: HOSPADM

## 2022-09-01 RX ORDER — LISINOPRIL 40 MG/1
40 TABLET ORAL DAILY
Status: DISCONTINUED | OUTPATIENT
Start: 2022-09-01 | End: 2022-09-04 | Stop reason: HOSPADM

## 2022-09-01 RX ORDER — SODIUM CHLORIDE 0.9 % (FLUSH) 0.9 %
5-40 SYRINGE (ML) INJECTION EVERY 8 HOURS
Status: DISCONTINUED | OUTPATIENT
Start: 2022-09-01 | End: 2022-09-04 | Stop reason: HOSPADM

## 2022-09-01 RX ORDER — IBUPROFEN 400 MG/1
400 TABLET ORAL ONCE
Status: COMPLETED | OUTPATIENT
Start: 2022-09-01 | End: 2022-09-01

## 2022-09-01 RX ORDER — BUDESONIDE AND FORMOTEROL FUMARATE DIHYDRATE 160; 4.5 UG/1; UG/1
2 AEROSOL RESPIRATORY (INHALATION)
Status: DISCONTINUED | OUTPATIENT
Start: 2022-09-01 | End: 2022-09-01

## 2022-09-01 RX ORDER — SODIUM CHLORIDE, SODIUM LACTATE, POTASSIUM CHLORIDE, CALCIUM CHLORIDE 600; 310; 30; 20 MG/100ML; MG/100ML; MG/100ML; MG/100ML
125 INJECTION, SOLUTION INTRAVENOUS CONTINUOUS
Status: DISPENSED | OUTPATIENT
Start: 2022-09-01 | End: 2022-09-02

## 2022-09-01 RX ORDER — ATORVASTATIN CALCIUM 20 MG/1
20 TABLET, FILM COATED ORAL DAILY
Status: DISCONTINUED | OUTPATIENT
Start: 2022-09-01 | End: 2022-09-04 | Stop reason: HOSPADM

## 2022-09-01 RX ORDER — ACETAMINOPHEN 650 MG/1
650 SUPPOSITORY RECTAL
Status: DISCONTINUED | OUTPATIENT
Start: 2022-09-01 | End: 2022-09-04 | Stop reason: HOSPADM

## 2022-09-01 RX ORDER — ENOXAPARIN SODIUM 100 MG/ML
40 INJECTION SUBCUTANEOUS DAILY
Status: DISCONTINUED | OUTPATIENT
Start: 2022-09-01 | End: 2022-09-04 | Stop reason: HOSPADM

## 2022-09-01 RX ORDER — IBUPROFEN 200 MG
1 TABLET ORAL DAILY
Status: DISCONTINUED | OUTPATIENT
Start: 2022-09-01 | End: 2022-09-04 | Stop reason: HOSPADM

## 2022-09-01 RX ORDER — SODIUM CHLORIDE 0.9 % (FLUSH) 0.9 %
5-40 SYRINGE (ML) INJECTION AS NEEDED
Status: DISCONTINUED | OUTPATIENT
Start: 2022-09-01 | End: 2022-09-04 | Stop reason: HOSPADM

## 2022-09-01 RX ADMIN — ACETAMINOPHEN 650 MG: 325 TABLET, FILM COATED ORAL at 15:31

## 2022-09-01 RX ADMIN — DIBASIC SODIUM PHOSPHATE, MONOBASIC POTASSIUM PHOSPHATE AND MONOBASIC SODIUM PHOSPHATE 2 TABLET: 852; 155; 130 TABLET ORAL at 10:58

## 2022-09-01 RX ADMIN — IBUPROFEN 400 MG: 400 TABLET, FILM COATED ORAL at 20:59

## 2022-09-01 RX ADMIN — ACETAMINOPHEN 650 MG: 325 TABLET, FILM COATED ORAL at 01:51

## 2022-09-01 RX ADMIN — ENOXAPARIN SODIUM 40 MG: 100 INJECTION SUBCUTANEOUS at 09:13

## 2022-09-01 RX ADMIN — DIBASIC SODIUM PHOSPHATE, MONOBASIC POTASSIUM PHOSPHATE AND MONOBASIC SODIUM PHOSPHATE 2 TABLET: 852; 155; 130 TABLET ORAL at 23:03

## 2022-09-01 RX ADMIN — INSULIN LISPRO 3 UNITS: 100 INJECTION, SOLUTION INTRAVENOUS; SUBCUTANEOUS at 23:03

## 2022-09-01 RX ADMIN — ASPIRIN 81 MG CHEWABLE TABLET 81 MG: 81 TABLET CHEWABLE at 09:13

## 2022-09-01 RX ADMIN — DOXYCYCLINE HYCLATE 100 MG: 100 CAPSULE ORAL at 23:03

## 2022-09-01 RX ADMIN — CEFTRIAXONE SODIUM 1 G: 1 INJECTION, POWDER, FOR SOLUTION INTRAMUSCULAR; INTRAVENOUS at 15:31

## 2022-09-01 RX ADMIN — INSULIN LISPRO 5 UNITS: 100 INJECTION, SOLUTION INTRAVENOUS; SUBCUTANEOUS at 13:37

## 2022-09-01 RX ADMIN — CEFTRIAXONE SODIUM 1 G: 1 INJECTION, POWDER, FOR SOLUTION INTRAMUSCULAR; INTRAVENOUS at 03:24

## 2022-09-01 RX ADMIN — SODIUM CHLORIDE, PRESERVATIVE FREE 10 ML: 5 INJECTION INTRAVENOUS at 23:04

## 2022-09-01 RX ADMIN — ALOGLIPTIN 12.5 MG: 12.5 TABLET, FILM COATED ORAL at 09:13

## 2022-09-01 RX ADMIN — SODIUM CHLORIDE, POTASSIUM CHLORIDE, SODIUM LACTATE AND CALCIUM CHLORIDE 125 ML/HR: 600; 310; 30; 20 INJECTION, SOLUTION INTRAVENOUS at 23:03

## 2022-09-01 RX ADMIN — INSULIN LISPRO 3 UNITS: 100 INJECTION, SOLUTION INTRAVENOUS; SUBCUTANEOUS at 15:40

## 2022-09-01 RX ADMIN — INSULIN LISPRO 5 UNITS: 100 INJECTION, SOLUTION INTRAVENOUS; SUBCUTANEOUS at 09:13

## 2022-09-01 RX ADMIN — INSULIN GLARGINE 20 UNITS: 100 INJECTION, SOLUTION SUBCUTANEOUS at 23:03

## 2022-09-01 RX ADMIN — DOXYCYCLINE HYCLATE 100 MG: 100 CAPSULE ORAL at 10:58

## 2022-09-01 RX ADMIN — SODIUM CHLORIDE, PRESERVATIVE FREE 20 ML: 5 INJECTION INTRAVENOUS at 15:31

## 2022-09-01 RX ADMIN — LISINOPRIL 40 MG: 40 TABLET ORAL at 09:13

## 2022-09-01 RX ADMIN — METFORMIN HYDROCHLORIDE 1000 MG: 500 TABLET ORAL at 09:13

## 2022-09-01 RX ADMIN — SODIUM CHLORIDE, PRESERVATIVE FREE 10 ML: 5 INJECTION INTRAVENOUS at 01:46

## 2022-09-01 RX ADMIN — DOXYCYCLINE HYCLATE 100 MG: 100 CAPSULE ORAL at 03:24

## 2022-09-01 RX ADMIN — ATORVASTATIN CALCIUM 20 MG: 20 TABLET, FILM COATED ORAL at 09:13

## 2022-09-01 RX ADMIN — SODIUM CHLORIDE, POTASSIUM CHLORIDE, SODIUM LACTATE AND CALCIUM CHLORIDE 125 ML/HR: 600; 310; 30; 20 INJECTION, SOLUTION INTRAVENOUS at 03:00

## 2022-09-01 RX ADMIN — SODIUM CHLORIDE, PRESERVATIVE FREE 10 ML: 5 INJECTION INTRAVENOUS at 05:15

## 2022-09-01 RX ADMIN — ACETAMINOPHEN 650 MG: 325 TABLET, FILM COATED ORAL at 23:03

## 2022-09-01 NOTE — PROGRESS NOTES
Problem: Mobility Impaired (Adult and Pediatric)  Goal: *Acute Goals and Plan of Care (Insert Text)  Description: I with LE HEP x7 days  Mod I with bed mob x7 days  SBA with all transfers x7 days  Amb 50-75ft with LRAD and SBAx1 x7 days    Pt stated goal: to feel better  Outcome: Not Met    PHYSICAL THERAPY EVALUATION  Patient: Odalis Pedro (34 y.o. male)  Date: 9/1/2022  Primary Diagnosis: DKA (diabetic ketoacidosis) ([facility] [E11.10]  Elevated troponin [R77.8]       Precautions:      ASSESSMENT    69yo M admitted to hospital with DKA now r/o for ? COVID/flu since developing a fever presents to PT with decreased bed mob, transfers, LE strength, gt,  balance, activity tolerance, and overall functional mobility. Pt sleeping upon PT arrival, agreeable to work with PT. Pt alert and oriented x4. Pt lives with spouse in 1 story home with 3 ARIAD[place] home with B rails. PTA pt was I with ADLs and amb without AD. Currently, pt is CGA with supine to sit EOB and sit to stand transfers. Upon standing pt became lightheaded but resolved quickly. Pt able to amb approx 15ft in room with RW and CGAx1, pt unsteady at times an shaky (chills from fever?) but no LOB during amb. Pt amb with slow kyle, shuffling gt pattern. Pt CGA for return to supine in bed. Pt may benefit from skilled PT to address his functional deficits. Recommend HHPT upon d/c at this time. PLAN :  Recommendations and Planned Interventions: bed mobility training, transfer training, gait training, therapeutic exercises, patient and family training/education, and therapeutic activities      Frequency/Duration: Patient will be followed by physical therapy:  5 times a week to address goals. Recommendation for discharge: (in order for the patient to meet his/her long term goals)  Home with [address] [place] [address]    IF patient discharges home will need the following DME: rolling walker? ? potentially         SUBJECTIVE:   Patient supine in bed sleeping upon PT arrival, agreeable to work with PT    OBJECTIVE DATA SUMMARY:   HISTORY:    Past Medical History:   Diagnosis Date    Cancer [facility])     prostate    Colloid cyst of brain ([address] 06/2011    S/P excision cyst at [facility] [address]    Constipation     Diabetes [facility])     IDDM    Erectile dysfunction     GERD (gastroesophageal reflux disease)     Hyperlipidemia     Hypertension     Migraines      Past Surgical History:   Procedure Laterality Date    HX GI  2012    HX HEENT  2006    exc mass right neck    HX OTHER SURGICAL  2012    brain    HX UROLOGICAL      prostate bx    NEUROLOGICAL PROCEDURE UNLISTED  6-2011    removal cranial colloid cyst at [facility] factors and/or comorbidities impacting plan of care:     Home Situation  Home Environment: Private residence  # Steps to Enter: 3  Rails to Enter: Yes  Hand Rails : Bilateral  One/Two Story Residence: One story  Living Alone: No  Support Systems: Spouse/Significant Other  Patient Expects to be Discharged to[de-identified] Home with home health  Current DME Used/Available at Home: Walker        EXAMINATION/PRESENTATION/DECISION MAKING:   Critical Behavior:  Neurologic State: Drowsy  Orientation Level: Oriented X4  Cognition: Follows commands           Range Of Motion:  AROM: Generally decreased, functional  B LE    Strength:    Strength: Generally decreased, functional  Grossly 4-/5 B LE    Tone & Sensation:      Intact to LT B LE    Functional Mobility:  Bed Mobility:  Rolling: Contact guard assistance  Supine to Sit: Contact guard assistance  Sit to Supine: Contact guard assistance  Scooting: Contact guard assistance  Transfers:  Sit to Stand: Contact guard assistance  Stand to Sit: Contact guard assistance  Stand Pivot Transfers: Contact guard assistance                    Balance:   Sitting: Intact  Standing: Impaired;Pull to stand; With support  Standing - Static: Constant support; Fair  Standing - Dynamic : Constant support; Fair  Ambulation/Gait Training:  Distance (ft): 15 Feet (ft)  Assistive Device: Walker, rolling;Gait belt  Ambulation - Level of Assistance: Contact guard assistance                  Functional Measure:  [facility] [address] [address] AM-PAC 6 Clicks         Basic Mobility Inpatient Short Form  How much difficulty does the patient currently have. .. Unable A Lot A Little None   1. Turning over in bed (including adjusting bedclothes, sheets and blankets)? [] 1   [] 2   [x] 3   [] 4   2. Sitting down on and standing up from a chair with arms ( e.g., wheelchair, bedside commode, etc.)   [] 1   [] 2   [x] 3   [] 4   3. Moving from lying on back to sitting on the side of the bed? [] 1   [] 2   [x] 3   [] 4          How much help from another person does the patient currently need. .. Total A Lot A Little None   4. Moving to and from a bed to a chair (including a wheelchair)? [] 1   [] 2   [x] 3   [] 4   5. Need to walk in hospital room? [] 1   [] 2   [x] 3   [] 4   6. Climbing 3-5 steps with a railing? [] 1   [x] 2   [] 3   [] 4   © 2007, Trustees of [address] [facility] [address] [address], under license to [employer]. All rights reserved     Score:  Initial: 17 Most Recent: X (Date: -- )   Interpretation of Tool:  Represents activities that are increasingly more difficult (i.e. Bed mobility, Transfers, Gait).   Score 24 23 22-20 19-15 14-10 9-7 6   Modifier CH CI CJ CK CL CM CN           Physical Therapy Evaluation Charge Determination   History Examination Presentation Decision-Making   MEDIUM  Complexity : 1-2 comorbidities / personal factors will impact the outcome/ POC  MEDIUM Complexity : 3 Standardized tests and measures addressing body structure, function, activity limitation and / or participation in recreation  LOW Complexity : Stable, uncomplicated  Other Functional Measure [facility] 6 MED      Based on the above components, the patient evaluation is determined to be of the following complexity level: LOW     Pain Rating:  No c/o pain    Activity Tolerance:   Fair      After treatment patient left in no apparent distress:   Supine in bed, Call bell within reach, Bed / chair alarm activated, Side rails x 3, and bed locked and in lowest position          COMMUNICATION/EDUCATION:   The patients plan of care was discussed with: Case management. Fall prevention education was provided and the patient/caregiver indicated understanding. and Patient/family agree to work toward stated goals and plan of care.       Thank you for this referral.  Bandar Stevenson   Time Calculation: 24 mins

## 2022-09-01 NOTE — PROGRESS NOTES
Reason for Admission:  DKA                     RUR Score: 11%                    Plan for utilizing home health: recommended and patient agreeable         PCP: First and Last name:  Saleem Lemos NP   Name of Practice:    Are you a current patient: Yes/No: yes   Approximate date of last visit: cannot recall   Can you participate in a virtual visit with your PCP: no                    Current Advanced Directive/Advance Care Plan: Full Code      Healthcare Decision Maker:   Patient reported his girlfriend is his POA. CM attempted to reach girlfriend, Geronimo Pedro, at number listed on facesheet [phone]. This is not a working number. CM is not able to confirm if she has POA or not. Click here to complete [address] including selection of the Healthcare Decision Maker Relationship (ie \"Primary\")                       Transition of Care Plan: CM spoke with the phone to complete assessment due to covid precautions. Patient verified home address and demos on facesheet. He reported he lives in a one story house with his daughter and his daughter's mother. He reported prior to admission he was independent and did not use any DME or assistive devices. At this time therapy is recommending home health and possibly a rolling walker. Patient is agreeable to [facility] with no preference and feels he would benefit from a walker. Referrals have been sent. CM will need a DME order for a rolling walker prior to discharge.

## 2022-09-01 NOTE — ROUTINE PROCESS
TRANSFER - OUT REPORT:    Verbal report given to Александр Erazo RN (name) on Gwen Aviles  being transferred to (unit) for routine progression of care       Report consisted of patients Situation, Background, Assessment and   Recommendations(SBAR). Information from the following report(s) SBAR was reviewed with the receiving nurse. Lines:   Peripheral IV 08/31/22 Posterior;Right Forearm (Active)   Site Assessment Clean, dry, & intact 08/31/22 1651   Dressing Status Clean, dry, & intact 08/31/22 1651   Dressing Type Transparent 08/31/22 1651   Hub Color/Line Status Pink 08/31/22 1651   Alcohol Cap Used No 08/31/22 1651        Opportunity for questions and clarification was provided.       Patient transported with:   Monitor

## 2022-09-01 NOTE — PROGRESS NOTES
Problem: Pressure Injury - Risk of  Goal: *Prevention of pressure injury  Description: Document Bryn Scale and appropriate interventions in the flowsheet.   Outcome: Progressing Towards Goal  Note: Pressure Injury Interventions:  Sensory Interventions: Assess changes in LOC, Keep linens dry and wrinkle-free, Minimize linen layers    Moisture Interventions: Absorbent underpads, Internal/External urinary devices    Activity Interventions: PT/OT evaluation    Mobility Interventions: PT/OT evaluation    Nutrition Interventions: Document food/fluid/supplement intake

## 2022-09-01 NOTE — H&P
Hospitalist History & Physical Notes. [facility]. Name : Odin Matos      MRN number : [id number]     YOB: 1955     Subjective :   Chief Complaint : Severe generalized weakness with dizziness, fall this morning    Source of information : Patient not a good historian, mostly from the ED provider and previous records. History of present illness:   69 y.o. male with history of hypertension, carcinoma prostate treated, diabetes mellitus on treatment presents to the emergency room complaining of severe generalized weakness started few days ago. States he was given a new prescription for insulin but not given by the pharmacy and not taking since few days. Started feeling weak and tired, it gradually worsened that he was having significant fatigue. This morning when he woke up he felt very dizzy and lightheaded and fell, did not lose any consciousness. Admits increased frequency of urination and feeling thirsty all the time. Denies any chest pain, trouble breathing, nausea or vomiting. States he has chronic cough nothing new now. Evaluation in the emergency room found with diabetic ketoacidosis, started on IV fluids and insulin coverage. As it was mild acidosis and type II patient monitored with subcu insulin and long-acting insulin, repeat chemistry suggested closing of the anion gap completely with normal CO2. He was transferred to the floor for further evaluation, during that time noted that he has temperature first-line. On arrival to the floor he has a temperature of 102. Ordered for blood cultures and urine cultures and COVID testing.     Past Medical History:   Diagnosis Date    Burning with urination     Cancer [facility])     prostate    Colloid cyst of brain ([facility] [address] 6/2011    S/P excision cyst at [address]    Constipation     Diabetes [facility])     IDDM    Diabetes ([facility] [address]     Erectile dysfunction     GERD (gastroesophageal reflux disease) Hyperlipidemia     Hypertension     Migraines      Past Surgical History:   Procedure Laterality Date    HX GI  2012    HX HEENT  2006    exc mass right neck    HX OTHER SURGICAL  2012    brain    HX UROLOGICAL      prostate bx    NEUROLOGICAL PROCEDURE UNLISTED  6-2011    removal cranial colloid cyst at [facility]     Family History   Problem Relation Age of Onset    Diabetes Mother     Heart Disease Mother     Stroke Mother     Heart Disease Father     Stroke Father     Hypertension Brother     Elevated Lipids Brother       Social History     Tobacco Use    Smoking status: Every Day     Packs/day: 0.50     Years: 53.00     Pack years: 26.50     Types: Cigarettes    Smokeless tobacco: Never    Tobacco comments:     Started smoking at age 11   Substance Use Topics    Alcohol use: Not Currently       Prior to Admission medications    Medication Sig Start Date End Date Taking? Authorizing Provider   atorvastatin (LIPITOR) 20 mg tablet Take 1 Tablet by mouth daily. 8/30/22  Yes Saleem Lemos NP   Janumet -1,000 mg TM24 TAKE 1 TABLET BY MOUTH DAILY. STOP JANUMET XR  4/18/22  Yes Provider, Historical   lisinopriL (PRINIVIL, ZESTRIL) 40 mg tablet Take 1 Tablet by mouth daily. 3/22/22  Yes Saleem Lemos NP   aspirin 81 mg chewable tablet Take 81 mg by mouth daily. Yes Provider, Historical   amoxicillin (AMOXIL) 500 mg capsule TAKE ONE CAPSULE BY MOUTH THREE TIMES DAILY FOR 10 DAYS 0/37/56   Catina Cordova NP   ibuprofen (MOTRIN) 600 mg tablet TAKE 1 TABLET BY MOUTH EVERY 6 HOURS AS NEEDED FOR PAIN 6/39/35   Catina Cordova NP   insulin glargine U-300 conc (Toujeo SoloStar U-300 Insulin) 300 unit/mL (1.5 mL) inpn pen Inject 20 units every morning  Patient not taking: Reported on 8/31/2022 3/18/22   Michael Draper MD   Insulin Needles, Disposable, 31 gauge x 5/16\" ndle Once a day 3/18/22   Michael Draper MD   ammonium lactate (AMLACTIN) 12 % topical cream Apply  to affected area two (2) times a day.  rub in to affected area well 3/3/22   Juan Charlton Mari, DPM   OneTouch Delica Plus Lancet 33 gauge misc USE TO TEST BLOOD GLUCOSE THREE TIMES DAILY 12/22/21   Provider, Historical   lancets misc Test 3 times daily. Dx code E11.65  Insurance brand of choice 12/22/21   Larwance Ron, DO   Blood-Glucose Meter monitoring kit Finger stick glucose TID DX E11.65  Insurance brand of choice. 12/22/21   Larwance Orn, DO   glucose blood VI test strips (ASCENSIA AUTODISC VI, ONE TOUCH ULTRA TEST VI) strip Finger stick glucose TID DX E11.65  Insurance brand of choice. 12/22/21   Larwance Ron, DO   glucose blood VI test strips (ONETOUCH VERIO) strip Test 3 times daily. Dx code 250.00 4/29/15   Herminio Szymanski MD     No Known Allergies          Review of Systems: Patient not a very good historian. Constitutional: Appetite is usually good, denies weight loss, no fever, no chills, no night sweats. Eye: No recent visual disturbances, no discharge, no double vision. Ear/nose/mouth/throat : No hearing disturbance, no ear pain, no nasal congestion, no sore throat, no trouble swallowing. Respiratory : No trouble breathing, + chronic cough, no shortness of breath, no hemoptysis, no wheezing. Cardiovascular : No chest pain, no palpitation, no orthopnea,  no peripheral edema. Gastrointestinal : No nausea, no vomiting,  No abdominal pain. Genitourinary : No dysuria, no hematuria. Admits increased frequency of urination. Endocrine : ++++ excessive thirst, +++ polyuria   Immunologic :  No seasonal allergies. Musculoskeletal : No joint swelling, No pain, No effusion. Complains of generalized muscle pains. Integumentary : Denies any itching,   Hematology : No petechiae, No easy bruising,    Neurology : Denies change in mental status, No headache, No confusion, No numbness or tingling. Psychiatric : No mood swings, No anxiety, No depression.     Vitals:   Patient Vitals for the past 12 hrs:   Temp Pulse Resp BP SpO2   09/01/22 0119 (!) 102.7 °F (39.3 °C) 90 16 136/71 98 %   09/01/22 0013 -- 92 20 (!) 140/73 98 %   08/31/22 2224 -- 91 21 (!) 140/86 99 %   08/31/22 2113 -- 92 20 136/78 100 %   08/31/22 2013 -- 88 18 124/69 100 %   08/31/22 1922 -- (!) 101 19 126/80 98 %   08/31/22 1801 -- (!) 102 21 (!) 166/90 98 %   08/31/22 1658 98.1 °F (36.7 °C) (!) 112 18 (!) 178/79 100 %       Physical Exam:   General : Looks weak but no distress noted. Pleasant, trying to eat. Magno Ra HEENT : PERRLA, dry oral mucosa, atraumatic normocephalic, Normal ear and nose. Neck : Supple, no JVD, no masses noted, no carotid bruit. Lungs : Breath sounds with good air entry bilaterally, no wheezes or rales, no accessory muscle use. CVS : Rhythm rate regular, S1+, S2+, no murmur or gallop. Abdomen : Soft, nontender,  bowel sounds active. Extremities : No edema noted,  pedal pulses palpable. Musculoskeletal : Fair range of motion, no joint swelling or effusion, muscle tone and power appears decreased. Skin : Dry, poor skin turgor. No pathological rash. Lymphatic : No cervical lymphadenopathy. Neurological : Awake, alert, oriented to time place person. Psychiatric : Mood and affect appears appropriate to the situation. Data Review:   Recent Results (from the past 24 hour(s))   EKG, 12 LEAD, INITIAL    Collection Time: 08/31/22  4:45 PM   Result Value Ref Range    Ventricular Rate 112 BPM    Atrial Rate 112 BPM    P-R Interval 124 ms    QRS Duration 132 ms    Q-T Interval 346 ms    QTC Calculation (Bezet) 472 ms    Calculated P Axis 44 degrees    Calculated R Axis -73 degrees    Calculated T Axis 47 degrees    Diagnosis       Sinus tachycardia  Right bundle branch block  Left anterior fascicular block   Bifascicular block   Minimal voltage criteria for LVH, may be normal variant  Septal infarct , age undetermined  Abnormal ECG  When compared with ECG of 01-NOV-2005 12:06,  Vent.  rate has increased BY  47 BPM  Septal infarct is now Present  ST elevation now present in Inferior leads  ST no longer elevated in Anterior leads  Confirmed by [facility], [address] ([id number]) on 8/31/2022 2:26:88 PM     METABOLIC PANEL, COMPREHENSIVE    Collection Time: 08/31/22  4:55 PM   Result Value Ref Range    Sodium 128 (L) 136 - 145 mmol/L    Potassium 4.1 3.5 - 5.1 mmol/L    Chloride 90 (L) 97 - 108 mmol/L    CO2 20 (L) 21 - 32 mmol/L    Anion gap 18 (H) 5 - 15 mmol/L    Glucose 624 (HH) 65 - 100 mg/dL    BUN 13 6 - 20 mg/dL    Creatinine 1.72 (H) 0.70 - 1.30 mg/dL    BUN/Creatinine ratio 8 (L) 12 - 20      GFR est AA 48 (L) >60 ml/min/1.73m2    GFR est non-AA 40 (L) >60 ml/min/1.73m2    Calcium 9.3 8.5 - 10.1 mg/dL    Bilirubin, total 0.8 0.2 - 1.0 mg/dL    AST (SGOT) 12 (L) 15 - 37 U/L    ALT (SGPT) 22 12 - 78 U/L    Alk. phosphatase 82 45 - 117 U/L    Protein, total 7.9 6.4 - 8.2 g/dL    Albumin 3.4 (L) 3.5 - 5.0 g/dL    Globulin 4.5 (H) 2.0 - 4.0 g/dL    A-G Ratio 0.8 (L) 1.1 - 2.2     CBC WITH AUTOMATED DIFF    Collection Time: 08/31/22  4:55 PM   Result Value Ref Range    WBC 18.6 (H) 4.1 - 11.1 K/uL    RBC 4.98 4.10 - 5.70 M/uL    HGB 14.5 12.1 - 17.0 g/dL    HCT 42.0 36.6 - 50.3 %    MCV 84.3 80.0 - 99.0 FL    MCH 29.1 26.0 - 34.0 PG    MCHC 34.5 30.0 - 36.5 g/dL    RDW 12.3 11.5 - 14.5 %    PLATELET 923 688 - 530 K/uL    MPV 11.0 8.9 - 12.9 FL    NEUTROPHILS 82 (H) 32 - 75 %    LYMPHOCYTES 7 (L) 12 - 49 %    MONOCYTES 10 5 - 13 %    EOSINOPHILS 0 0 - 7 %    BASOPHILS 0 0 - 1 %    IMMATURE GRANULOCYTES 1 (H) 0.0 - 0.5 %    ABS. NEUTROPHILS 15.3 (H) 1.8 - 8.0 K/UL    ABS. LYMPHOCYTES 1.2 0.8 - 3.5 K/UL    ABS. MONOCYTES 1.9 (H) 0.0 - 1.0 K/UL    ABS. EOSINOPHILS 0.0 0.0 - 0.4 K/UL    ABS. BASOPHILS 0.1 0.0 - 0.1 K/UL    ABS. IMM.  GRANS. 0.1 (H) 0.00 - 0.04 K/UL    DF AUTOMATED     TROPONIN-HIGH SENSITIVITY    Collection Time: 08/31/22  4:55 PM   Result Value Ref Range    Troponin-High Sensitivity 265 (HH) 0 - 76 ng/L   URINALYSIS W/ RFLX MICROSCOPIC    Collection Time: 08/31/22  6:10 PM Result Value Ref Range    Color Yellow      Appearance Clear Clear      Specific gravity 1.005 1.003 - 1.030      pH (UA) 6.0 5.0 - 8.0      Protein Negative Negative mg/dL    Glucose >1,000 (A) Negative mg/dL    Ketone 15 (A) Negative mg/dL    Bilirubin Negative Negative      Blood Small (A) Negative      Urobilinogen 0.1 (L) 0.2 - 1.0 EU/dL    Nitrites Negative Negative      Leukocyte Esterase Negative Negative     URINE MICROSCOPIC    Collection Time: 08/31/22  6:10 PM   Result Value Ref Range    WBC 0-4 0 - 4 /hpf    RBC 10-20 0 - 5 /hpf    Epithelial cells Few Few /lpf    Bacteria Negative Negative /hpf   TROPONIN-HIGH SENSITIVITY    Collection Time: 08/31/22  6:59 PM   Result Value Ref Range    Troponin-High Sensitivity 392 (HH) 0 - 76 ng/L   METABOLIC PANEL, BASIC    Collection Time: 08/31/22  7:00 PM   Result Value Ref Range    Sodium 133 (L) 136 - 145 mmol/L    Potassium 3.9 3.5 - 5.1 mmol/L    Chloride 100 97 - 108 mmol/L    CO2 17 (L) 21 - 32 mmol/L    Anion gap 16 (H) 5 - 15 mmol/L    Glucose 342 (H) 65 - 100 mg/dL    BUN 11 6 - 20 mg/dL    Creatinine 1.55 (H) 0.70 - 1.30 mg/dL    BUN/Creatinine ratio 7 (L) 12 - 20      GFR est AA 54 (L) >60 ml/min/1.73m2    GFR est non-AA 45 (L) >60 ml/min/1.73m2    Calcium 8.0 (L) 8.5 - 10.1 mg/dL   GLUCOSE, POC    Collection Time: 08/31/22  7:25 PM   Result Value Ref Range    Glucose (POC) 314 (H) 65 - 100 mg/dL    Performed by [facility]    METABOLIC PANEL, BASIC    Collection Time: 08/31/22 10:18 PM   Result Value Ref Range    Sodium 135 (L) 136 - 145 mmol/L    Potassium 3.9 3.5 - 5.1 mmol/L    Chloride 100 97 - 108 mmol/L    CO2 25 21 - 32 mmol/L    Anion gap 10 5 - 15 mmol/L    Glucose 278 (H) 65 - 100 mg/dL    BUN 11 6 - 20 mg/dL    Creatinine 1.53 (H) 0.70 - 1.30 mg/dL    BUN/Creatinine ratio 7 (L) 12 - 20      GFR est AA 55 (L) >60 ml/min/1.73m2    GFR est non-AA 45 (L) >60 ml/min/1.73m2    Calcium 8.1 (L) 8.5 - 10.1 mg/dL       Radiologic Studies : CXR Results  (Last 48 hours)                 08/31/22 1703  XR CHEST PORT Final result    Impression:  No acute cardiopulmonary process. Narrative:  EXAM: XR CHEST PORT       HISTORY: dizziness. COMPARISON: 7/21/2011       FINDINGS: Single view(s) of the chest. The lungs are well inflated. No focal   consolidation, pleural effusion, or pneumothorax. The cardiomediastinal   silhouette is unremarkable. The visualized osseous structures are unremarkable. Assessment and Plan :     Diabetic ketoacidosis type II uncontrolled with hyperglycemia: Secondary to missing his long-acting insulin supposed to take. We will keep him on long-acting insulin, continue his home medications of sitagliptin and metformin. We will keep him on Accu-Cheks with sliding scale insulin, if he continues to do well next 24 hours we can plan for discharge    Fever: Did not have on arrival to the emergency room. Likely because of his stress. We cannot rule out COVID-19 due to current situation, not in any distress and does not look septic. Ordered for blood cultures, procalcitonin and lactic acid. Physically patient does not look septic, it may be just an influenza or COVID. Benign essential hypertension: On lisinopril with good control    Chronic smoker, chronic cough lungs with prolonged expirations, suspected undiagnosed COPD started on a Symbicort inhaler. Nicotine patch. History of carcinoma prostate in remission, will check a PSA. Last PSA on file was from November 2020 it is less than 0.1. Admitted to medical floor, full CODE STATUS, patient unable to verify the home medications. Already reconciliation is done by nursing staff. Has no advance medical directives on file. CC : Ellis Koroma NP  Signed By: Beatriz Agosto MD     September 1, 2022      This dictation was done by dragon, computer voice recognition software.   Often unanticipated grammatical, syntax, [employer] phones and other interpretive errors are inadvertently transcribed. Please excuse errors that have escaped final proofreading.

## 2022-09-01 NOTE — PROGRESS NOTES
Hospitalist Progress Note               Daily Progress Note: 9/1/2022      Subjective:   Hospital course to date:  Patient is a 56-year-old male with a history of hypertension, prostate cancer, and diabetes who presented to the ED on 8/31 with severe generalized weakness for several days. He was complaining of urinary frequency and polydipsia. Patient had been given a recent prescription for insulin but had not been taking it recently. Patient also noted to have a fever of 102 and testing for COVID still pending. Chest x-ray was clear and patient was not hypoxic. Initial labs showed mild DKA with a bicarb of 17 and a gap of 16. He had a white count of 18,000. He was started on IV fluids and subcutaneous insulin and within 3 hours his DKA had already resolved. Patient was admitted to the floor. Patient was also noted to have an elevated troponin of 265 on admission. Repeat was 392    Patient was started empirically on ceftriaxone and doxycycline    --------    Seen today for follow-up. No new problems. Labs this morning show a sodium of 132, glucose 260, creatinine 1.3. Procalcitonin 1.9.     Patient remains febrile at 102.7 earlier this morning    He is feeling better currently    Problem List:  Problem List as of 9/1/2022 Date Reviewed: 9/1/2022            Codes Class Noted - Resolved    DKA (diabetic ketoacidosis) ([facility] ICD-10-CM: E11.10  ICD-9-CM: 250.12  8/31/2022 - Present        Elevated troponin ICD-10-CM: R77.8  ICD-9-CM: 790.6  8/31/2022 - Present        Glycosuria ICD-10-CM: R81  ICD-9-CM: 791.5  2/28/2022 - Present        Nocturia ICD-10-CM: R35.1  ICD-9-CM: 788.43  2/15/2022 - Present        Tobacco dependence ICD-10-CM: F17.200  ICD-9-CM: 305.1  2/15/2022 - Present        Uncontrolled type 2 diabetes mellitus with hyperglycemia ([facility] ICD-10-CM: E11.65  ICD-9-CM: 250.02  2/9/2022 - Present        Benign essential HTN ICD-10-CM: I10  ICD-9-CM: 401.1  2/9/2022 - Present        Mixed hyperlipidemia ICD-10-CM: E78.2  ICD-9-CM: 272.2  2/9/2022 - Present        Hypertension ICD-10-CM: I10  ICD-9-CM: 401.9  Unknown - Present        Hyperlipidemia ICD-10-CM: E78.5  ICD-9-CM: 272.4  Unknown - Present        Erectile dysfunction ICD-10-CM: N52.9  ICD-9-CM: 607.84  Unknown - Present        Prostate cancer ([facility] ICD-10-CM: C61  ICD-9-CM: 185  8/4/2011 - Present           Medications reviewed  Current Facility-Administered Medications   Medication Dose Route Frequency    insulin lispro (HUMALOG) injection   SubCUTAneous AC&HS    glucose chewable tablet 16 g  4 Tablet Oral PRN    glucagon (GLUCAGEN) injection 1 mg  1 mg IntraMUSCular PRN    sodium chloride (NS) flush 5-40 mL  5-40 mL IntraVENous Q8H    sodium chloride (NS) flush 5-40 mL  5-40 mL IntraVENous PRN    acetaminophen (TYLENOL) tablet 650 mg  650 mg Oral Q6H PRN    Or    acetaminophen (TYLENOL) suppository 650 mg  650 mg Rectal Q6H PRN    ondansetron (ZOFRAN ODT) tablet 4 mg  4 mg Oral Q6H PRN    Or    ondansetron (ZOFRAN) injection 4 mg  4 mg IntraVENous Q6H PRN    enoxaparin (LOVENOX) injection 40 mg  40 mg SubCUTAneous DAILY    dextrose 10% infusion 0-250 mL  0-250 mL IntraVENous PRN    cefTRIAXone (ROCEPHIN) 1 g in sterile water (preservative free) 10 mL IV syringe  1 g IntraVENous Q12H    doxycycline (VIBRAMYCIN) capsule 100 mg  100 mg Oral Q12H    nicotine (NICODERM CQ) 21 mg/24 hr patch 1 Patch  1 Patch TransDERmal DAILY    aspirin chewable tablet 81 mg  81 mg Oral DAILY    lisinopriL (PRINIVIL, ZESTRIL) tablet 40 mg  40 mg Oral DAILY    atorvastatin (LIPITOR) tablet 20 mg  20 mg Oral DAILY    insulin glargine (LANTUS) injection 15 Units  15 Units SubCUTAneous QHS    lactated Ringers infusion  125 mL/hr IntraVENous CONTINUOUS    alogliptin (NESINA) tablet 12.5 mg  12.5 mg Oral DAILY    And    metFORMIN (GLUCOPHAGE) tablet 1,000 mg  1,000 mg Oral DAILY       Review of Systems:   A comprehensive review of systems was negative except for that written in the HPI. Objective:   Physical Exam:     Visit Vitals  BP (!) 166/81 (BP 1 Location: Left upper arm)   Pulse (!) 105   Temp (!) 102.9 °F (39.4 °C)   Resp 16   Ht 5' 9\" (1.753 m)   Wt 78 kg (172 lb)   SpO2 97%   BMI 25.40 kg/m²      O2 Device: None (Room air)    Temp (24hrs), Av.2 °F (38.4 °C), Min:98.1 °F (36.7 °C), Max:102.9 °F (39.4 °C)    No intake/output data recorded.  190 -  0700  In: 1000 [I.V.:1000]  Out: -     General:   Awake and alert   Lungs:   Clear to auscultation bilaterally. Chest wall:  No tenderness or deformity. Heart:  Regular rate and rhythm, S1, S2 normal, no murmur, click, rub or gallop. Abdomen:   Soft, non-tender. Bowel sounds normal. No masses,  No organomegaly. Extremities: Extremities normal, atraumatic, no cyanosis or edema. Pulses: 2+ and symmetric all extremities. Skin: Skin color, texture, turgor normal. No rashes or lesions   Neurologic: CNII-XII intact. No gross focal deficits         Data Review:       Recent Days:  Recent Labs     22  1655   WBC 18.6*   HGB 14.5   HCT 42.0        Recent Labs     22  0738 22  2218 22  1900 22  1655   * 135* 133* 128*   K 3.9 3.9 3.9 4.1    100 100 90*   CO2 22 25 17* 20*   * 278* 342* 624*   BUN 12 11 11 13   CREA 1.35* 1.53* 1.55* 1.72*   CA 8.4* 8.1* 8.0* 9.3   PHOS 1.5*  --   --   --    ALB 2.6*  --   --  3.4*   TBILI  --   --   --  0.8   ALT  --   --   --  22     No results for input(s): PH, PCO2, PO2, HCO3, FIO2 in the last 72 hours. 24 Hour Results:      XR CHEST PORT   Final Result   No acute cardiopulmonary process.               Assessment:  Possible COVID-19 infection without pneumonitis or hypoxia    Uncontrolled diabetes mellitus type 2 with mild DKA on presentation, now resolved    Dehydration with acute kidney injury, improved    Generalized weakness due to all of above    Tobacco abuse with suspected underlying COPD    Elevated troponin, probably due to sepsis    Sepsis with fever, tachycardia, leukocytosis and elevated procalcitonin.    -Leukocytosis may be in part due to leukemoid reaction        Hypophosphatemia    Plan: We will request PT and OT evaluations  Will continue on empiric antibiotics for now pending blood cultures  Cardiology has been consulted  Recheck labs in a.m. Care Plan discussed with: Patient/Family    Disposition: Probable discharge in 24 hours    Total time spent with patient: 30 minutes.     Sloane Crawford MD

## 2022-09-01 NOTE — CONSULTS
Consult    Patient: Estelle Bradley MRN: [id number]  SSN: [id number]    YOB: 1955  Age: 69 y.o. Sex: male      Subjective:      Estelle Bradley is a 69 y.o. male who is being seen for abnormal troponin . Patient is a 61-year-old -American male with history of prostate cancer, diabetes, GERD, hypertension, hyperlipidemia, migraine. .  Presented complaining of lower abdominal pain and generalized weakness that has been going on for few days now. Denied having any chest pain or chest tightness or nausea or vomiting or excessive sweating. He has been feeling dizzy and lightheaded. He has been urinating more frequently.   Past Medical History:   Diagnosis Date    Cancer [facility])     prostate    Colloid cyst of brain ([facility] [address] 06/2011    S/P excision cyst at [address]    Constipation     Diabetes [facility])     IDDM    Erectile dysfunction     GERD (gastroesophageal reflux disease)     Hyperlipidemia     Hypertension     Migraines      Past Surgical History:   Procedure Laterality Date    HX GI  2012    HX HEENT  2006    exc mass right neck    HX OTHER SURGICAL  2012    brain    HX UROLOGICAL      prostate bx    NEUROLOGICAL PROCEDURE UNLISTED  6-2011    removal cranial colloid cyst at [facility]      Family History   Problem Relation Age of Onset    Diabetes Mother     Heart Disease Mother     Stroke Mother     Heart Disease Father     Stroke Father     Hypertension Brother     Elevated Lipids Brother      Social History     Tobacco Use    Smoking status: Every Day     Packs/day: 0.50     Years: 53.00     Pack years: 26.50     Types: Cigarettes    Smokeless tobacco: Never    Tobacco comments:     Started smoking at age 11   Substance Use Topics    Alcohol use: Not Currently      Current Facility-Administered Medications   Medication Dose Route Frequency Provider Last Rate Last Admin    insulin lispro (HUMALOG) injection   SubCUTAneous AC&HS Castro Voss MD   5 Units at 09/01/22 0913    glucose chewable tablet 16 g  4 Tablet Oral PRN Laura Lamas MD        glucagon (GLUCAGEN) injection 1 mg  1 mg IntraMUSCular PRN Laura Lamas MD        sodium chloride (NS) flush 5-40 mL  5-40 mL IntraVENous Q8H Laura Lamas MD   10 mL at 09/01/22 0515    sodium chloride (NS) flush 5-40 mL  5-40 mL IntraVENous PRN Laura Lamas MD        acetaminophen (TYLENOL) tablet 650 mg  650 mg Oral Q6H PRN Laura Lamas MD   650 mg at 09/01/22 0151    Or    acetaminophen (TYLENOL) suppository 650 mg  650 mg Rectal Q6H PRN Laura Lamas MD        ondansetron (ZOFRAN ODT) tablet 4 mg  4 mg Oral Q6H PRN Laura Lamas MD        Or    ondansetron (ZOFRAN) injection 4 mg  4 mg IntraVENous Q6H PRN Laura Lamas MD        enoxaparin (LOVENOX) injection 40 mg  40 mg SubCUTAneous DAILY Laura Lamas MD   40 mg at 09/01/22 0913    dextrose 10% infusion 0-250 mL  0-250 mL IntraVENous PRN Laura Lamas MD        cefTRIAXone (ROCEPHIN) 1 g in sterile water (preservative free) 10 mL IV syringe  1 g IntraVENous Q12H Laura Lamas MD   1 g at 09/01/22 0324    doxycycline (VIBRAMYCIN) capsule 100 mg  100 mg Oral Q12H Laura Lamas MD   100 mg at 09/01/22 1058    nicotine (NICODERM CQ) 21 mg/24 hr patch 1 Patch  1 Patch TransDERmal DAILY Laura Lamas MD   1 Patch at 09/01/22 0913    aspirin chewable tablet 81 mg  81 mg Oral DAILY Laura Lamas MD   81 mg at 09/01/22 0913    lisinopriL (PRINIVIL, ZESTRIL) tablet 40 mg  40 mg Oral DAILY Laura Lamas MD   40 mg at 09/01/22 0913    atorvastatin (LIPITOR) tablet 20 mg  20 mg Oral DAILY Laura Lamas MD   20 mg at 09/01/22 0913    alogliptin (NESINA) tablet 12.5 mg  12.5 mg Oral DAILY Laura Lamas MD   12.5 mg at 09/01/22 8302    And    metFORMIN (GLUCOPHAGE) tablet 1,000 mg  1,000 mg Oral DAILY Laura Lamas MD 1,000 mg at 09/01/22 0913    phosphorus (K PHOS NEUTRAL) 250 mg tablet 2 Tablet  2 Tablet Oral BID Emily Comer MD   2 Tablet at 09/01/22 1058    insulin glargine (LANTUS) injection 20 Units  20 Units SubCUTAneous QHS Emily Comer MD            No Known Allergies    Review of Systems:  A comprehensive review of systems was negative except for that written in the History of Present Illness. Objective:     Vitals:    09/01/22 0400 09/01/22 0455 09/01/22 0705 09/01/22 0800   BP:   (!) 166/81    Pulse: 84  (!) 105 (!) 105   Resp:   16    Temp:  (!) 101.1 °F (38.4 °C) (!) 102.9 °F (39.4 °C)    SpO2:   97%    Weight:       Height:            Physical Exam:  General:  Alert, cooperative, no distress, appears stated age. Eyes:  Conjunctivae/corneas clear. PERRL, EOMs intact. Fundi benign   Ears:  Normal TMs and external ear canals both ears. Nose: Nares normal. Septum midline. Mucosa normal. No drainage or sinus tenderness. Mouth/Throat: Lips, mucosa, and tongue normal. Teeth and gums normal.   Neck: Supple, symmetrical, trachea midline, no adenopathy, thyroid: no enlargment/tenderness/nodules, no carotid bruit and no JVD. Back:   Symmetric, no curvature. ROM normal. No CVA tenderness. Lungs:   Clear to auscultation bilaterally. Heart:  Regular rate and rhythm, S1, S2 normal, no murmur, click, rub or gallop. Abdomen:   Soft, non-tender. Bowel sounds normal. No masses,  No organomegaly. Extremities: Extremities normal, atraumatic, no cyanosis or edema. Pulses: 2+ and symmetric all extremities. Skin: Skin color, texture, turgor normal. No rashes or lesions   Lymph nodes: Cervical, supraclavicular, and axillary nodes normal.   Neurologic: CNII-XII intact. Normal strength, sensation and reflexes throughout.          Assessment:     Hospital Problems  Date Reviewed: 9/1/2022            Codes Class Noted POA    DKA (diabetic ketoacidosis) ([facility] ICD-10-CM: E11.10  ICD-9-CM: 250.12  8/31/2022 Yes Elevated troponin ICD-10-CM: R77.8  ICD-9-CM: 790.6  8/31/2022 Yes         Patient is a 38-year-old -American male with:  1. Mildly elevated troponin with no chest pain or anginal equivalent symptoms  2. DKA  3. Diabetes mellitus, poorly controlled  4. Chronic smoker  5. History of prostate cancer in remission  6. Plan:     White count elevated at 18,000. Hemoglobin 12. Sodium 132, potassium is 3.9. Creatinine 1.35 is better to 1.53 the day before. Calcium 8.4. Troponin was up to 428. Denied having any anginal equivalent symptoms. C-reactive protein is elevated. COVID test is pending. EKG showed sinus rhythm, right bundle branch block with left intrafascicular block. Currently on aspirin, atorvastatin, lisinopril and nicotine patches. Discussed regarding the importance of smoking cessation. We will check an echocardiogram.  Further recommendation depending on clinical progression, echo findings and troponin trend    Thank you  For involving me in Mr. Thania sims. I will follow.       Signed By: Lucy Hawkins MD     September 1, 2022

## 2022-09-02 ENCOUNTER — TELEPHONE (OUTPATIENT)
Dept: FAMILY MEDICINE CLINIC | Age: 67
End: 2022-09-02

## 2022-09-02 ENCOUNTER — APPOINTMENT (OUTPATIENT)
Dept: NON INVASIVE DIAGNOSTICS | Age: 67
DRG: 871 | End: 2022-09-02
Attending: INTERNAL MEDICINE
Payer: MEDICARE

## 2022-09-02 LAB
ALBUMIN SERPL-MCNC: 2 G/DL (ref 3.5–5)
ALBUMIN/GLOB SERPL: 0.6 {RATIO} (ref 1.1–2.2)
ALP SERPL-CCNC: 54 U/L (ref 45–117)
ALT SERPL-CCNC: 38 U/L (ref 12–78)
ANION GAP SERPL CALC-SCNC: 6 MMOL/L (ref 5–15)
AST SERPL W P-5'-P-CCNC: 56 U/L (ref 15–37)
BASOPHILS # BLD: 0 K/UL (ref 0–0.1)
BASOPHILS NFR BLD: 0 % (ref 0–1)
BILIRUB SERPL-MCNC: 0.6 MG/DL (ref 0.2–1)
BUN SERPL-MCNC: 14 MG/DL (ref 6–20)
BUN/CREAT SERPL: 12 (ref 12–20)
CA-I BLD-MCNC: 7.7 MG/DL (ref 8.5–10.1)
CHLORIDE SERPL-SCNC: 101 MMOL/L (ref 97–108)
CO2 SERPL-SCNC: 25 MMOL/L (ref 21–32)
CREAT SERPL-MCNC: 1.19 MG/DL (ref 0.7–1.3)
DIFFERENTIAL METHOD BLD: ABNORMAL
ECHO AO ASC DIAM: 3.3 CM
ECHO AO ASCENDING AORTA INDEX: 1.7 CM/M2
ECHO AO ROOT DIAM: 2.7 CM
ECHO AO ROOT INDEX: 1.39 CM/M2
ECHO AV AREA PEAK VELOCITY: 2.7 CM2
ECHO AV AREA VTI: 2.8 CM2
ECHO AV AREA/BSA PEAK VELOCITY: 1.4 CM2/M2
ECHO AV AREA/BSA VTI: 1.4 CM2/M2
ECHO AV MEAN GRADIENT: 4 MMHG
ECHO AV MEAN VELOCITY: 0.9 M/S
ECHO AV PEAK GRADIENT: 10 MMHG
ECHO AV PEAK VELOCITY: 1.6 M/S
ECHO AV VELOCITY RATIO: 0.69
ECHO AV VTI: 25.5 CM
ECHO EST RA PRESSURE: 3 MMHG
ECHO LA AREA 2C: 12.1 CM2
ECHO LA AREA 4C: 11.7 CM2
ECHO LA DIAMETER INDEX: 1.75 CM/M2
ECHO LA DIAMETER: 3.4 CM
ECHO LA MAJOR AXIS: 4.7 CM
ECHO LA MINOR AXIS: 4.2 CM
ECHO LA TO AORTIC ROOT RATIO: 1.26
ECHO LA VOL BP: 27 ML (ref 18–58)
ECHO LA VOL/BSA BIPLANE: 14 ML/M2 (ref 16–34)
ECHO LV EDV A2C: 38 ML
ECHO LV EDV A4C: 40 ML
ECHO LV EDV INDEX A4C: 21 ML/M2
ECHO LV EDV NDEX A2C: 20 ML/M2
ECHO LV EJECTION FRACTION A2C: 53 %
ECHO LV EJECTION FRACTION A4C: 26 %
ECHO LV EJECTION FRACTION BIPLANE: 37 % (ref 55–100)
ECHO LV ESV A2C: 18 ML
ECHO LV ESV A4C: 30 ML
ECHO LV ESV INDEX A2C: 9 ML/M2
ECHO LV ESV INDEX A4C: 15 ML/M2
ECHO LV FRACTIONAL SHORTENING: 29 % (ref 28–44)
ECHO LV INTERNAL DIMENSION DIASTOLE INDEX: 2.11 CM/M2
ECHO LV INTERNAL DIMENSION DIASTOLIC: 4.1 CM (ref 4.2–5.9)
ECHO LV INTERNAL DIMENSION SYSTOLIC INDEX: 1.49 CM/M2
ECHO LV INTERNAL DIMENSION SYSTOLIC: 2.9 CM
ECHO LV IVSD: 1.2 CM (ref 0.6–1)
ECHO LV MASS 2D: 182.5 G (ref 88–224)
ECHO LV MASS INDEX 2D: 94 G/M2 (ref 49–115)
ECHO LV POSTERIOR WALL DIASTOLIC: 1.3 CM (ref 0.6–1)
ECHO LV RELATIVE WALL THICKNESS RATIO: 0.63
ECHO LVOT AREA: 3.8 CM2
ECHO LVOT AV VTI INDEX: 0.73
ECHO LVOT DIAM: 2.2 CM
ECHO LVOT MEAN GRADIENT: 2 MMHG
ECHO LVOT PEAK GRADIENT: 5 MMHG
ECHO LVOT PEAK VELOCITY: 1.1 M/S
ECHO LVOT STROKE VOLUME INDEX: 36.2 ML/M2
ECHO LVOT SV: 70.3 ML
ECHO LVOT VTI: 18.5 CM
ECHO MV REGURGITANT PEAK GRADIENT: 92 MMHG
ECHO MV REGURGITANT PEAK VELOCITY: 4.8 M/S
ECHO PV MAX VELOCITY: 1.1 M/S
ECHO PV PEAK GRADIENT: 4 MMHG
ECHO RA AREA 4C: 9 CM2
ECHO RA END SYSTOLIC VOLUME APICAL 4 CHAMBER INDEX BSA: 9 ML/M2
ECHO RA VOLUME: 17 ML
ECHO RIGHT VENTRICULAR SYSTOLIC PRESSURE (RVSP): 22 MMHG
ECHO RV BASAL DIMENSION: 2.4 CM
ECHO RV MID DIMENSION: 1.7 CM
ECHO TV REGURGITANT MAX VELOCITY: 2.18 M/S
ECHO TV REGURGITANT PEAK GRADIENT: 19 MMHG
EOSINOPHIL # BLD: 0 K/UL (ref 0–0.4)
EOSINOPHIL NFR BLD: 0 % (ref 0–7)
ERYTHROCYTE [DISTWIDTH] IN BLOOD BY AUTOMATED COUNT: 12.6 % (ref 11.5–14.5)
GLOBULIN SER CALC-MCNC: 3.1 G/DL (ref 2–4)
GLUCOSE BLD STRIP.AUTO-MCNC: 106 MG/DL (ref 65–100)
GLUCOSE BLD STRIP.AUTO-MCNC: 148 MG/DL (ref 65–100)
GLUCOSE BLD STRIP.AUTO-MCNC: 164 MG/DL (ref 65–100)
GLUCOSE BLD STRIP.AUTO-MCNC: 97 MG/DL (ref 65–100)
GLUCOSE SERPL-MCNC: 155 MG/DL (ref 65–100)
HCT VFR BLD AUTO: 32.5 % (ref 36.6–50.3)
HGB BLD-MCNC: 10.9 G/DL (ref 12.1–17)
IMM GRANULOCYTES # BLD AUTO: 0.1 K/UL (ref 0–0.04)
IMM GRANULOCYTES NFR BLD AUTO: 1 % (ref 0–0.5)
LYMPHOCYTES # BLD: 1.4 K/UL (ref 0.8–3.5)
LYMPHOCYTES NFR BLD: 12 % (ref 12–49)
MCH RBC QN AUTO: 28.8 PG (ref 26–34)
MCHC RBC AUTO-ENTMCNC: 33.5 G/DL (ref 30–36.5)
MCV RBC AUTO: 86 FL (ref 80–99)
MONOCYTES # BLD: 1.1 K/UL (ref 0–1)
MONOCYTES NFR BLD: 9 % (ref 5–13)
NEUTS SEG # BLD: 9.4 K/UL (ref 1.8–8)
NEUTS SEG NFR BLD: 78 % (ref 32–75)
NRBC # BLD: 0 K/UL (ref 0–0.01)
NRBC BLD-RTO: 0 PER 100 WBC
PERFORMED BY, TECHID: ABNORMAL
PERFORMED BY, TECHID: NORMAL
PLATELET # BLD AUTO: 174 K/UL (ref 150–400)
PMV BLD AUTO: 11.7 FL (ref 8.9–12.9)
POTASSIUM SERPL-SCNC: 3.8 MMOL/L (ref 3.5–5.1)
PROCALCITONIN SERPL-MCNC: 2.08 NG/ML
PROT SERPL-MCNC: 5.1 G/DL (ref 6.4–8.2)
RBC # BLD AUTO: 3.78 M/UL (ref 4.1–5.7)
SODIUM SERPL-SCNC: 132 MMOL/L (ref 136–145)
WBC # BLD AUTO: 12 K/UL (ref 4.1–11.1)

## 2022-09-02 PROCEDURE — 97116 GAIT TRAINING THERAPY: CPT

## 2022-09-02 PROCEDURE — 74011250637 HC RX REV CODE- 250/637: Performed by: HOSPITALIST

## 2022-09-02 PROCEDURE — 74011250636 HC RX REV CODE- 250/636: Performed by: HOSPITALIST

## 2022-09-02 PROCEDURE — 74011636637 HC RX REV CODE- 636/637: Performed by: INTERNAL MEDICINE

## 2022-09-02 PROCEDURE — 97110 THERAPEUTIC EXERCISES: CPT

## 2022-09-02 PROCEDURE — 93306 TTE W/DOPPLER COMPLETE: CPT

## 2022-09-02 PROCEDURE — 82962 GLUCOSE BLOOD TEST: CPT

## 2022-09-02 PROCEDURE — 74011000250 HC RX REV CODE- 250: Performed by: HOSPITALIST

## 2022-09-02 PROCEDURE — 80053 COMPREHEN METABOLIC PANEL: CPT

## 2022-09-02 PROCEDURE — 85025 COMPLETE CBC W/AUTO DIFF WBC: CPT

## 2022-09-02 PROCEDURE — 74011636637 HC RX REV CODE- 636/637: Performed by: HOSPITALIST

## 2022-09-02 PROCEDURE — 84145 PROCALCITONIN (PCT): CPT

## 2022-09-02 PROCEDURE — 36415 COLL VENOUS BLD VENIPUNCTURE: CPT

## 2022-09-02 PROCEDURE — 74011000258 HC RX REV CODE- 258: Performed by: HOSPITALIST

## 2022-09-02 PROCEDURE — 74011250637 HC RX REV CODE- 250/637: Performed by: INTERNAL MEDICINE

## 2022-09-02 PROCEDURE — 65270000029 HC RM PRIVATE

## 2022-09-02 RX ADMIN — SODIUM CHLORIDE, PRESERVATIVE FREE 10 ML: 5 INJECTION INTRAVENOUS at 14:46

## 2022-09-02 RX ADMIN — ASPIRIN 81 MG CHEWABLE TABLET 81 MG: 81 TABLET CHEWABLE at 09:18

## 2022-09-02 RX ADMIN — PIPERACILLIN AND TAZOBACTAM 3.38 G: 3; .375 INJECTION, POWDER, FOR SOLUTION INTRAVENOUS at 21:10

## 2022-09-02 RX ADMIN — SODIUM CHLORIDE, PRESERVATIVE FREE 10 ML: 5 INJECTION INTRAVENOUS at 21:10

## 2022-09-02 RX ADMIN — ATORVASTATIN CALCIUM 20 MG: 20 TABLET, FILM COATED ORAL at 09:18

## 2022-09-02 RX ADMIN — LISINOPRIL 40 MG: 40 TABLET ORAL at 09:18

## 2022-09-02 RX ADMIN — DIBASIC SODIUM PHOSPHATE, MONOBASIC POTASSIUM PHOSPHATE AND MONOBASIC SODIUM PHOSPHATE 2 TABLET: 852; 155; 130 TABLET ORAL at 09:18

## 2022-09-02 RX ADMIN — PIPERACILLIN AND TAZOBACTAM 3.38 G: 3; .375 INJECTION, POWDER, FOR SOLUTION INTRAVENOUS at 05:44

## 2022-09-02 RX ADMIN — ACETAMINOPHEN 650 MG: 325 TABLET, FILM COATED ORAL at 21:25

## 2022-09-02 RX ADMIN — DOXYCYCLINE HYCLATE 100 MG: 100 CAPSULE ORAL at 09:18

## 2022-09-02 RX ADMIN — DIBASIC SODIUM PHOSPHATE, MONOBASIC POTASSIUM PHOSPHATE AND MONOBASIC SODIUM PHOSPHATE 2 TABLET: 852; 155; 130 TABLET ORAL at 21:10

## 2022-09-02 RX ADMIN — INSULIN LISPRO 2 UNITS: 100 INJECTION, SOLUTION INTRAVENOUS; SUBCUTANEOUS at 09:29

## 2022-09-02 RX ADMIN — PIPERACILLIN SODIUM AND TAZOBACTAM SODIUM 4.5 G: 4; .5 INJECTION, POWDER, LYOPHILIZED, FOR SOLUTION INTRAVENOUS at 00:13

## 2022-09-02 RX ADMIN — DOXYCYCLINE HYCLATE 100 MG: 100 CAPSULE ORAL at 21:10

## 2022-09-02 RX ADMIN — ENOXAPARIN SODIUM 40 MG: 100 INJECTION SUBCUTANEOUS at 09:21

## 2022-09-02 RX ADMIN — PIPERACILLIN AND TAZOBACTAM 3.38 G: 3; .375 INJECTION, POWDER, FOR SOLUTION INTRAVENOUS at 14:46

## 2022-09-02 RX ADMIN — SODIUM CHLORIDE, PRESERVATIVE FREE 10 ML: 5 INJECTION INTRAVENOUS at 05:52

## 2022-09-02 RX ADMIN — ALOGLIPTIN 12.5 MG: 12.5 TABLET, FILM COATED ORAL at 09:18

## 2022-09-02 RX ADMIN — INSULIN GLARGINE 20 UNITS: 100 INJECTION, SOLUTION SUBCUTANEOUS at 21:10

## 2022-09-02 RX ADMIN — METFORMIN HYDROCHLORIDE 1000 MG: 500 TABLET ORAL at 09:18

## 2022-09-02 NOTE — PROGRESS NOTES
Progress Note    Patient: Griselda Han MRN: [id number]  SSN: [id number]    YOB: 1955  Age: 69 y.o. Sex: male      Admit Date: 8/31/2022    LOS: 2 days     Subjective:     No acute events overnight. He feels better. Objective:     Vitals:    09/01/22 2241 09/02/22 0100 09/02/22 0330 09/02/22 0826   BP: (!) 142/81  139/82 (!) 140/81   Pulse: 86  88 81   Resp: 18  17 20   Temp: 99.8 °F (37.7 °C) 98.7 °F (37.1 °C) 98.5 °F (36.9 °C) 98.9 °F (37.2 °C)   SpO2: 97%  97% 98%   Weight:       Height:            Intake and Output:  Current Shift: No intake/output data recorded. Last three shifts: 08/31 1901 - 09/02 0700  In: 3220 [P.O.:720; I.V.:2500]  Out: 1200 [Urine:1200]    Physical Exam:   General:  He is oriented to self. Eyes:  Conjunctivae/corneas clear. PERRL, EOMs intact. Fundi benign   Ears:  Normal TMs and external ear canals both ears. Nose: Nares normal. Septum midline. Mucosa normal. No drainage or sinus tenderness. Mouth/Throat: Lips, mucosa, and tongue normal. Teeth and gums normal.   Neck: Supple, symmetrical, trachea midline, no adenopathy, thyroid: no enlargment/tenderness/nodules, no carotid bruit and no JVD. Back:   Symmetric, no curvature. ROM normal. No CVA tenderness. Lungs:   Clear to auscultation bilaterally. Heart:  Regular rate and rhythm, S1, S2 normal, no murmur, click, rub or gallop. Abdomen:   Soft, non-tender. Bowel sounds normal. No masses,  No organomegaly. Extremities: Extremities normal, atraumatic, no cyanosis or edema. Pulses: 2+ and symmetric all extremities. Skin: Skin color, texture, turgor normal. No rashes or lesions   Lymph nodes: Cervical, supraclavicular, and axillary nodes normal.   Neurologic: CNII-XII intact. Normal strength, sensation and reflexes throughout. Lab/Data Review: All lab results for the last 24 hours reviewed.          Assessment:     Active Problems:    DKA (diabetic ketoacidosis) ([facility] (8/31/2022) Elevated troponin (8/31/2022)    Patient is a 64-year-old -American male with:  1. Mildly elevated troponin with no chest pain or anginal equivalent symptoms  2. DKA  3. Diabetes mellitus, poorly controlled  4. Chronic smoker  5. History of prostate cancer in remission  6. Plan:     Rhythm has been stable. Denied having any chest pain or shortness of breath. Being treated for DKA.   Check echocardiogram.  Signed By: Soren Sal MD     September 2, 2022

## 2022-09-02 NOTE — PROGRESS NOTES
PHYSICAL THERAPY TREATMENT  Patient: Belgica Hernandez (70 y.o. male)  Date: 9/2/2022  Diagnosis: DKA (diabetic ketoacidosis) ([facility] [address] [E11.10]  Elevated troponin [R77.8] <principal problem not specified>      Precautions:    Chart, physical therapy assessment, plan of care and goals were reviewed. ASSESSMENT  Patient continues with skilled PT services and is progressing towards goals. Pt with nursing upon arrival receiving meds and agreeable to therapy. Patient progressing well towards goals and did not c/o any pain or lightheadedness during session. Pt performed bed mob supervision, transfers CGA and ambulated CGA approx 175 ft with no LOB noted using RW. Patient amb with slow, steady kyle with occasional cueing to stay closer to RW. Pt amb the last few feet in room without RW and pt also steady with no LOB. Pt may benefit from RW at home for longer distances along with HHPT. Pt tolerated seated and standing therex well with good dynamic balance using RW for UE support and balance. Will cont to progress further mobility and ambulation with and without RW use. Current Level of Function Impacting Discharge (mobility/balance): safety, balance, using RW for safety    Other factors to consider for discharge: PLOF, safety and assist at home, lightheadedness at times          PLAN :  Patient continues to benefit from skilled intervention to address the above impairments. Continue treatment per established plan of care. to address goals. Recommendation for discharge: (in order for the patient to meet his/her long term goals)  Home with [address] [place] [address]    This discharge recommendation:  Has been made in collaboration with the attending provider and/or case management    IF patient discharges home will need the following DME: rolling walker       SUBJECTIVE:   Patient stated Delle Sever is much better than yesterday.     OBJECTIVE DATA SUMMARY:   Critical Behavior:  Neurologic State: Alert  Orientation Level: Oriented X4  Cognition: Appropriate decision making, Appropriate safety awareness, Appropriate for age attention/concentration, Follows commands     Functional Mobility Training:  Bed Mobility:  Rolling: Supervision  Supine to Sit: Supervision  Sit to Supine: Supervision  Scooting: Supervision    Transfers:  Sit to Stand: Contact guard assistance  Stand to Sit: Contact guard assistance    Balance:  Sitting: Intact  Standing: Intact; With support  Standing - Static: Constant support;Good  Standing - Dynamic : Constant support;Good    Ambulation/Gait Training:  Distance (ft): 175 Feet (ft)  Assistive Device: Gait belt;Walker, rolling  Ambulation - Level of Assistance: Contact guard assistance      Therapeutic Exercises:       EXERCISE   Sets   Reps   Active Active Assist   Passive Self ROM   Comments   Standing heel raises  10 [x] [] [] []    Standing Hip abd/add  10 [x] [] [] []    Seated Long Arc Quads  10 [x] [] [] []    Standing Marching  10 [x] [] [] []       Pain Ratin/10    Activity Tolerance:    Good and requires rest breaks  Please refer to the flowsheet for vital signs taken during this treatment. After treatment patient left in no apparent distress:   Bed returned to lowest position and locked, Supine in bed, Call bell within reach, Bed / chair alarm activated, and Side rails x 3    COMMUNICATION/COLLABORATION:   The patients plan of care was discussed with: Occupational therapist and Registered nurse.      Jake Zamarripa   Time Calculation: 23 mins         Problem: Mobility Impaired (Adult and Pediatric)  Goal: *Acute Goals and Plan of Care (Insert Text)  Description: I with LE HEP x7 days  Mod I with bed mob x7 days  SBA with all transfers x7 days  Amb 50-75ft with LRAD and SBAx1 x7 days    Pt stated goal: to feel better  Outcome: Progressing Towards Goal

## 2022-09-02 NOTE — PROGRESS NOTES
Patient has been accepted with [facility] [place]note 2] once medically stable for discharge. CM will still need an order for a rolling walker to order.

## 2022-09-02 NOTE — PROGRESS NOTES
Hospitalist Progress Note               Daily Progress Note: 9/2/2022      Subjective:   Hospital course to date:  Patient is a 64-year-old male with a history of hypertension, prostate cancer, and diabetes who presented to the ED on 8/31 with severe generalized weakness for several days. He was complaining of urinary frequency and polydipsia. Patient had been given a recent prescription for insulin but had not been taking it recently. Patient also noted to have a fever of 102 and testing for COVID still pending. Chest x-ray was clear and patient was not hypoxic. Initial labs showed mild DKA with a bicarb of 17 and a gap of 16. He had a white count of 18,000. He was started on IV fluids and subcutaneous insulin and within 3 hours his DKA had already resolved. Patient was admitted to the floor. Patient was also noted to have an elevated troponin of 265 on admission. Repeat was 392    Patient was started empirically on ceftriaxone and doxycycline    --------  Patient is seen for follow-up. He had high fevers throughout most of the day yesterday. Sent him down for a CT of the chest, abdomen pelvis yesterday which showed a fairly significant left lower lobe infiltrate which was not evident on chest x-ray.     He has had no fever overnight    COVID-19 testing came back negative    The telemetry nocturnist transitioned him to Zosyn last night which is not unreasonable         Problem List:  Problem List as of 9/2/2022 Date Reviewed: 9/1/2022            Codes Class Noted - Resolved    DKA (diabetic ketoacidosis) ([facility] ICD-10-CM: E11.10  ICD-9-CM: 250.12  8/31/2022 - Present        Elevated troponin ICD-10-CM: R77.8  ICD-9-CM: 790.6  8/31/2022 - Present        Glycosuria ICD-10-CM: R81  ICD-9-CM: 791.5  2/28/2022 - Present        Nocturia ICD-10-CM: R35.1  ICD-9-CM: 788.43  2/15/2022 - Present        Tobacco dependence ICD-10-CM: Q76.912  ICD-9-CM: 305.1  2/15/2022 - Present        Uncontrolled type 2 diabetes mellitus with hyperglycemia ([facility] ICD-10-CM: E11.65  ICD-9-CM: 250.02  2/9/2022 - Present        Benign essential HTN ICD-10-CM: I10  ICD-9-CM: 401.1  2/9/2022 - Present        Mixed hyperlipidemia ICD-10-CM: E78.2  ICD-9-CM: 272.2  2/9/2022 - Present        Hypertension ICD-10-CM: I10  ICD-9-CM: 401.9  Unknown - Present        Hyperlipidemia ICD-10-CM: E78.5  ICD-9-CM: 272.4  Unknown - Present        Erectile dysfunction ICD-10-CM: N52.9  ICD-9-CM: 607.84  Unknown - Present        Prostate cancer ([facility] ICD-10-CM: C61  ICD-9-CM: 185  8/4/2011 - Present         Medications reviewed  Current Facility-Administered Medications   Medication Dose Route Frequency    insulin lispro (HUMALOG) injection   SubCUTAneous AC&HS    glucose chewable tablet 16 g  4 Tablet Oral PRN    glucagon (GLUCAGEN) injection 1 mg  1 mg IntraMUSCular PRN    sodium chloride (NS) flush 5-40 mL  5-40 mL IntraVENous Q8H    sodium chloride (NS) flush 5-40 mL  5-40 mL IntraVENous PRN    acetaminophen (TYLENOL) tablet 650 mg  650 mg Oral Q6H PRN    Or    acetaminophen (TYLENOL) suppository 650 mg  650 mg Rectal Q6H PRN    ondansetron (ZOFRAN ODT) tablet 4 mg  4 mg Oral Q6H PRN    Or    ondansetron (ZOFRAN) injection 4 mg  4 mg IntraVENous Q6H PRN    enoxaparin (LOVENOX) injection 40 mg  40 mg SubCUTAneous DAILY    dextrose 10% infusion 0-250 mL  0-250 mL IntraVENous PRN    doxycycline (VIBRAMYCIN) capsule 100 mg  100 mg Oral Q12H    nicotine (NICODERM CQ) 21 mg/24 hr patch 1 Patch  1 Patch TransDERmal DAILY    aspirin chewable tablet 81 mg  81 mg Oral DAILY    lisinopriL (PRINIVIL, ZESTRIL) tablet 40 mg  40 mg Oral DAILY    atorvastatin (LIPITOR) tablet 20 mg  20 mg Oral DAILY    alogliptin (NESINA) tablet 12.5 mg  12.5 mg Oral DAILY    And    metFORMIN (GLUCOPHAGE) tablet 1,000 mg  1,000 mg Oral DAILY    phosphorus (K PHOS NEUTRAL) 250 mg tablet 2 Tablet  2 Tablet Oral BID    insulin glargine (LANTUS) injection 20 Units  20 Units SubCUTAneous QHS    piperacillin-tazobactam (ZOSYN) 3.375 g in 0.9% sodium chloride (MBP/ADV) 100 mL MBP  3.375 g IntraVENous Q8H       Review of Systems:   A comprehensive review of systems was negative except for that written in the HPI. Objective:   Physical Exam:     Visit Vitals  BP (!) 140/81 (BP 1 Location: Left upper arm, BP Patient Position: At rest)   Pulse 81   Temp 98.9 °F (37.2 °C)   Resp 20   Ht 5' 9\" (1.753 m)   Wt 78 kg (172 lb)   SpO2 98%   BMI 25.40 kg/m²      O2 Device: None (Room air)    Temp (24hrs), Av.3 °F (37.9 °C), Min:98.5 °F (36.9 °C), Max:103 °F (39.4 °C)    No intake/output data recorded. 1901 -  0700  In: 4987 [P.O.:720; I.V.:2500]  Out: 1200 [Urine:1200]    General:   Awake and alert   Lungs:   Crackles left lower lobe   Chest wall:  No tenderness or deformity. Heart:  Regular rate and rhythm, S1, S2 normal, no murmur, click, rub or gallop. Abdomen:   Soft, non-tender. Bowel sounds normal. No masses,  No organomegaly. Extremities: Extremities normal, atraumatic, no cyanosis or edema. Pulses: 2+ and symmetric all extremities. Skin: Skin color, texture, turgor normal. No rashes or lesions   Neurologic: CNII-XII intact. No gross focal deficits         Data Review:       Recent Days:  Recent Labs     22  0302 22  0754 22  1655   WBC 12.0* 18.0* 18.6*   HGB 10.9* 12.0* 14.5   HCT 32.5* 36.2* 42.0    203 221       Recent Labs     22  0302 22  0738 22  2218 22  1900 22  1655   * 132* 135*   < > 128*   K 3.8 3.9 3.9   < > 4.1    102 100   < > 90*   CO2 25 22 25   < > 20*   * 260* 278*   < > 624*   BUN 14 12 11   < > 13   CREA 1.19 1.35* 1.53*   < > 1.72*   CA 7.7* 8.4* 8.1*   < > 9.3   PHOS  --  1.5*  --   --   --    ALB 2.0* 2.6*  --   --  3.4*   TBILI 0.6  --   --   --  0.8   ALT 38  --   --   --  22    < > = values in this interval not displayed.        No results for input(s): PH, PCO2, PO2, HCO3, FIO2 in the last 72 hours. 24 Hour Results:      CT CHEST ABD PELV WO CONT   Final Result   1. Left lower lobe consolidation, concerning for pneumonia. Recommend follow-up   in 6-8 weeks. 2.  No evidence of obvious intra-abdominal infection. XR CHEST PORT   Final Result   No acute cardiopulmonary process. Assessment:  Community acquired pneumonia left lower lobe    Uncontrolled diabetes mellitus type 2 with mild DKA on presentation, now resolved    Dehydration with acute kidney injury, improved    Generalized weakness due to all of above    Tobacco abuse with suspected underlying COPD    Elevated troponin, probably due to sepsis    Sepsis with fever, tachycardia, leukocytosis and elevated procalcitonin. Hypophosphatemia    Plan:  Continue Zosyn and doxycycline  Obtain sputum culture  Await blood cultures    Care Plan discussed with: Patient/Family    Disposition: Probable discharge in 24 hours    Total time spent with patient: 30 minutes.     Juwan Beck MD

## 2022-09-02 NOTE — TELEPHONE ENCOUNTER
[facility] are going to start meeting with this patient and would like for you to follow the services please call Malena Holloway at [phone] to confirm

## 2022-09-02 NOTE — PROGRESS NOTES
Problem: Falls - Risk of  Goal: *Absence of Falls  Description: Document [place] Fall Risk and appropriate interventions in the flowsheet.   Outcome: Progressing Towards Goal  Note: Fall Risk Interventions:  Mobility Interventions: Bed/chair exit alarm, Utilize walker, cane, or other assistive device         Medication Interventions: Bed/chair exit alarm    Elimination Interventions: Bed/chair exit alarm, Call light in reach, Patient to call for help with toileting needs    History of Falls Interventions: Bed/chair exit alarm

## 2022-09-03 LAB
GLUCOSE BLD STRIP.AUTO-MCNC: 102 MG/DL (ref 65–100)
GLUCOSE BLD STRIP.AUTO-MCNC: 139 MG/DL (ref 65–100)
GLUCOSE BLD STRIP.AUTO-MCNC: 164 MG/DL (ref 65–100)
GLUCOSE BLD STRIP.AUTO-MCNC: 216 MG/DL (ref 65–100)
PERFORMED BY, TECHID: ABNORMAL

## 2022-09-03 PROCEDURE — 65270000029 HC RM PRIVATE

## 2022-09-03 PROCEDURE — 82962 GLUCOSE BLOOD TEST: CPT

## 2022-09-03 PROCEDURE — 97165 OT EVAL LOW COMPLEX 30 MIN: CPT

## 2022-09-03 PROCEDURE — 74011250637 HC RX REV CODE- 250/637: Performed by: HOSPITALIST

## 2022-09-03 PROCEDURE — 74011636637 HC RX REV CODE- 636/637: Performed by: HOSPITALIST

## 2022-09-03 PROCEDURE — 74011250637 HC RX REV CODE- 250/637: Performed by: INTERNAL MEDICINE

## 2022-09-03 PROCEDURE — 74011250636 HC RX REV CODE- 250/636: Performed by: HOSPITALIST

## 2022-09-03 PROCEDURE — 74011000258 HC RX REV CODE- 258: Performed by: HOSPITALIST

## 2022-09-03 PROCEDURE — 97535 SELF CARE MNGMENT TRAINING: CPT

## 2022-09-03 PROCEDURE — 74011000250 HC RX REV CODE- 250: Performed by: HOSPITALIST

## 2022-09-03 RX ADMIN — PIPERACILLIN AND TAZOBACTAM 3.38 G: 3; .375 INJECTION, POWDER, FOR SOLUTION INTRAVENOUS at 15:35

## 2022-09-03 RX ADMIN — INSULIN LISPRO 2 UNITS: 100 INJECTION, SOLUTION INTRAVENOUS; SUBCUTANEOUS at 17:08

## 2022-09-03 RX ADMIN — METFORMIN HYDROCHLORIDE 1000 MG: 500 TABLET ORAL at 08:14

## 2022-09-03 RX ADMIN — ENOXAPARIN SODIUM 40 MG: 100 INJECTION SUBCUTANEOUS at 08:14

## 2022-09-03 RX ADMIN — PIPERACILLIN AND TAZOBACTAM 3.38 G: 3; .375 INJECTION, POWDER, FOR SOLUTION INTRAVENOUS at 05:44

## 2022-09-03 RX ADMIN — ASPIRIN 81 MG CHEWABLE TABLET 81 MG: 81 TABLET CHEWABLE at 08:14

## 2022-09-03 RX ADMIN — DOXYCYCLINE HYCLATE 100 MG: 100 CAPSULE ORAL at 21:53

## 2022-09-03 RX ADMIN — ACETAMINOPHEN 650 MG: 325 TABLET, FILM COATED ORAL at 05:52

## 2022-09-03 RX ADMIN — ALOGLIPTIN 12.5 MG: 12.5 TABLET, FILM COATED ORAL at 08:14

## 2022-09-03 RX ADMIN — LISINOPRIL 40 MG: 40 TABLET ORAL at 08:15

## 2022-09-03 RX ADMIN — DIBASIC SODIUM PHOSPHATE, MONOBASIC POTASSIUM PHOSPHATE AND MONOBASIC SODIUM PHOSPHATE 2 TABLET: 852; 155; 130 TABLET ORAL at 21:53

## 2022-09-03 RX ADMIN — SODIUM CHLORIDE, PRESERVATIVE FREE 10 ML: 5 INJECTION INTRAVENOUS at 05:44

## 2022-09-03 RX ADMIN — SODIUM CHLORIDE, PRESERVATIVE FREE 10 ML: 5 INJECTION INTRAVENOUS at 15:35

## 2022-09-03 RX ADMIN — DOXYCYCLINE HYCLATE 100 MG: 100 CAPSULE ORAL at 08:15

## 2022-09-03 RX ADMIN — ATORVASTATIN CALCIUM 20 MG: 20 TABLET, FILM COATED ORAL at 08:15

## 2022-09-03 RX ADMIN — SODIUM CHLORIDE, PRESERVATIVE FREE 10 ML: 5 INJECTION INTRAVENOUS at 21:53

## 2022-09-03 RX ADMIN — PIPERACILLIN AND TAZOBACTAM 3.38 G: 3; .375 INJECTION, POWDER, FOR SOLUTION INTRAVENOUS at 21:53

## 2022-09-03 RX ADMIN — INSULIN LISPRO 3 UNITS: 100 INJECTION, SOLUTION INTRAVENOUS; SUBCUTANEOUS at 11:06

## 2022-09-03 NOTE — PROGRESS NOTES
Problem: Self Care Deficits Care Plan (Adult)  Goal: *Acute Goals and Plan of Care (Insert Text)  Description: Pt stated goal \"I want to wash up\"  Pt will be supervision sup<->sit in prep for EOB ADL's  Pt will be supervision  LB dressing EOB level  Pt will be supervision  sit<-> prep for toilet transfer  Pt will be supervision  toilet transfer with LRAD  Pt will be supervision  toileting/cloth mgmt LRAD  Pt will be supervision  grooming standing sink  Pt will be supervision bathing sitting/standing sink LRAD  Pt will be MI gordon UE HEP in prep for self care tasks    Outcome: Progressing Towards Goal    OCCUPATIONAL THERAPY EVALUATION  Patient: Wilber Sheffield (25 y.o. male)  Date: 9/3/2022  Primary Diagnosis: DKA (diabetic ketoacidosis) ([facility] [address] [E11.10]  Elevated troponin [R77.8]       Precautions:     ASSESSMENT  Pt is 68 y/o male came to [facility] with severe generalized weakness, dizzy and lightheaded, fall at home and adm 8/31/2022 for diabetic ketoacidosis type II uncontrolled with hyperglycemia, fever, CAP left lower lobe, dehydration with ERASTO, generalized weakness, sepsis. Pt has hx of HTN, DM, prostate carcinoma treated, colloid cyst of brain s/p excision at [facility] (6/2011), GERD, migraines, HEENT exc mass right neck (2006). Pt received semi supine in bed A&O to self and place (reoriented to time) and agreeable for OT eval/tx. Per pt report, pt lives with family  in 1 story home with 4 steps no rails to enter and is independent for self care and functional transfers/mobility (has RW if needed).     Pt currently presents with decreased balance, decreased activity tolerance, generalized weakness and increased need for assist with self care (SBA LB dressing, SBA toileting/cloth mgmt, SBA grooming standing sink, min A UB dressing 2/2 line management) and functional transfers/mobility (SBA sup<->sit and scooting EOB, SBA sit<->stand and toilet transfer with gait belt with 1 LOB noted however pt able to recover with SBA). Pt would benefit from skilled OT services while at [facility] in order to increase safety and independence with self care and functional transfers/mobility. Recommend discharge to home with family care and [address] [place] [address] when medically appropriate. Other factors to consider for discharge: time since onset, excellent family support        PLAN :  Recommendations and Planned Interventions: self care training, functional mobility training, therapeutic exercise, balance training, therapeutic activities, endurance activities, patient education, and home safety training    Frequency/Duration: Patient will be followed by occupational therapy 2-3x/week to address goals. Recommendation for discharge: (in order for the patient to meet his/her long term goals)  Home with [address] [place] [address] and family care    This discharge recommendation:  Has been made in collaboration with the attending provider and/or case management    IF patient discharges home will need the following DME: shower chair       SUBJECTIVE:   Patient stated that feels good.  after washing face    OBJECTIVE DATA SUMMARY:   HISTORY:   Past Medical History:   Diagnosis Date    Cancer ([facility] [address]     prostate    Colloid cyst of brain ([facility] [address] 06/2011    S/P excision cyst at [address]    Constipation     Diabetes [facility])     IDDM    Erectile dysfunction     GERD (gastroesophageal reflux disease)     Hyperlipidemia     Hypertension     Migraines      Past Surgical History:   Procedure Laterality Date    HX GI  2012    HX HEENT  2006    exc mass right neck    HX OTHER SURGICAL  2012    brain    HX UROLOGICAL      prostate bx    NEUROLOGICAL PROCEDURE UNLISTED  6-2011    removal cranial colloid cyst at [facility]       Expanded or extensive additional review of patient history:     Home Situation  Home Environment: Private residence  # Steps to Enter: 4  Rails to Enter: No  Hand Rails : Bilateral  One/Two Story Residence: One story  Living Alone: No  Support Systems: Child(josé)  Patient Expects to be Discharged to[de-identified] Home with family assistance  Current DME Used/Available at Home: Walker, rolling  Tub or Shower Type: Tub/Shower combination    PLOF: Pt independent for ADLS/IADLS, independent with mobility prior to admission. EXAMINATION OF PERFORMANCE DEFICITS:  Cognitive/Behavioral Status:  Neurologic State: Alert  Orientation Level: Oriented to person;Oriented to place (not oriented to time (pt stating summer and 2003))  Cognition: Appropriate decision making; Appropriate for age attention/concentration; Appropriate safety awareness; Follows commands    Hearing: Auditory  Auditory Impairment: Hard of hearing, bilateral    Range of Motion:  AROM: Within functional limits     Strength:  Strength:  Within functional limits     Balance:  Sitting: Intact  Standing: Intact  Standing - Static: Good  Standing - Dynamic : Good (1 LOB SBA to recover)    Functional Mobility and Transfers for ADLs:  Bed Mobility:  Rolling: Supervision  Supine to Sit: Supervision  Sit to Supine: Supervision  Scooting: Supervision    Transfers:  Sit to Stand: Stand-by assistance  Stand to Sit: Stand-by assistance  Bed to Chair: Stand-by assistance  Bathroom Mobility: Stand-by assistance  Toilet Transfer : Stand-by assistance  Assistive Device : Gait Belt    ADL Assessment:     Oral Facial Hygiene/Grooming: Stand-by assistance    Upper Body Dressing: Minimum assistance (2/2 line management)    Lower Body Dressing: Stand-by assistance    Toileting: Stand by assistance     ADL Intervention and task modifications:     Grooming  Grooming Assistance: Stand-by assistance  Position Performed: Standing  Washing Face: Stand-by assistance  Washing Hands: Stand-by assistance    Upper Body [address]: Minimum  assistance (2/2 line management)    Lower Body Dressing Assistance  Socks: Stand-by assistance  Position Performed: Seated edge of bed    Toileting  Toileting Assistance: Stand-by assistance  Bladder Hygiene: Stand-by assistance  Bowel Hygiene: Stand-by assistance  Clothing Management: Stand-by assistance    Cognitive Retraining  Orientation Retraining: Reorienting;Time    [employer] AM-PACTM \"6 Clicks\"                                                       Daily Activity Inpatient Short Form  How much help from another person does the patient currently need. .. Total; A Lot A Little None   1. Putting on and taking off regular lower body clothing? []  1 []  2 [x]  3 []  4   2. Bathing (including washing, rinsing, drying)? []  1 []  2 [x]  3 []  4   3. Toileting, which includes using toilet, bedpan or urinal? [] 1 []  2 [x]  3 []  4   4. Putting on and taking off regular upper body clothing? []  1 []  2 [x]  3 []  4   5. Taking care of personal grooming such as brushing teeth? []  1 []  2 [x]  3 []  4   6. Eating meals? []  1 []  2 []  3 [x]  4   © 2007, Trustees of [facility] [employer], under license to [employer]. All rights reserved     Score: 19/24     Interpretation of Tool:  Represents clinically-significant functional categories (i.e. Activities of daily living). Percentage of Impairment CH    0%   CI    1-19% CJ    20-39% CK    40-59% CL    60-79% CM    80-99% CN     100%   [facility]  Score 6-24 24 23 20-22 15-19 10-14 7-9 6        Occupational Therapy Evaluation Charge Determination   History Examination Decision-Making   LOW Complexity : Brief history review  LOW Complexity : 1-3 performance deficits relating to physical, cognitive , or psychosocial skils that result in activity limitations and / or participation restrictions  MEDIUM Complexity : Patient may present with comorbidities that affect occupational performnce.  Miniml to moderate modification of tasks or assistance (eg, physical or verbal ) with assesment(s) is necessary to enable patient to complete evaluation       Based on the above components, the patient evaluation is determined to be of the following complexity level: LOW   Pain Rating:  No pain reported    Activity Tolerance:   Good  Please refer to the flowsheet for vital signs taken during this treatment. After treatment patient left in no apparent distress:    Supine in bed, Call bell within reach, and Caregiver / family present    COMMUNICATION/EDUCATION:   The patients plan of care was discussed with: Registered nurse. Home safety education was provided and the patient/caregiver indicated understanding. and Patient/family have participated as able in goal setting and plan of care. This patients plan of care is appropriate for delegation to [facility].     Thank you for this referral.  Robbi Lutz  Time Calculation: 28 mins

## 2022-09-03 NOTE — PROGRESS NOTES
Hospitalist Progress Note               Daily Progress Note: 9/3/2022      Subjective:   Hospital course to date:  Patient is a 78-year-old male with a history of hypertension, prostate cancer, and diabetes who presented to the ED on 8/31 with severe generalized weakness for several days. He was complaining of urinary frequency and polydipsia. Patient had been given a recent prescription for insulin but had not been taking it recently. Patient also noted to have a fever of 102 and testing for COVID still pending. Chest x-ray was clear and patient was not hypoxic. Initial labs showed mild DKA with a bicarb of 17 and a gap of 16. He had a white count of 18,000. He was started on IV fluids and subcutaneous insulin and within 3 hours his DKA had already resolved. Patient was admitted to the floor. Patient was also noted to have an elevated troponin of 265 on admission. Repeat was 392    Patient was started empirically on ceftriaxone and doxycycline, then transitioned to Zosyn and doxycycline    --------  Patient is seen for follow-up. He is doing well overall. Fever of 100.7 at 9 PM last night and low-grade temperature of 100.4 at 6:00 this morning. He notes his cough has improved. He continues to breathe comfortably on room air.   Labs are pending       Blood cultures are negative to date    Problem List:  Problem List as of 9/3/2022 Date Reviewed: 9/1/2022            Codes Class Noted - Resolved    DKA (diabetic ketoacidosis) ([facility] ICD-10-CM: E11.10  ICD-9-CM: 250.12  8/31/2022 - Present        Elevated troponin ICD-10-CM: R77.8  ICD-9-CM: 790.6  8/31/2022 - Present        Glycosuria ICD-10-CM: R81  ICD-9-CM: 791.5  2/28/2022 - Present        Nocturia ICD-10-CM: R35.1  ICD-9-CM: 788.43  2/15/2022 - Present        Tobacco dependence ICD-10-CM: F17.200  ICD-9-CM: 305.1  2/15/2022 - Present        Uncontrolled type 2 diabetes mellitus with hyperglycemia ([facility] ICD-10-CM: E11.65  ICD-9-CM: 250.02 2/9/2022 - Present        Benign essential HTN ICD-10-CM: I10  ICD-9-CM: 401.1  2/9/2022 - Present        Mixed hyperlipidemia ICD-10-CM: E78.2  ICD-9-CM: 272.2  2/9/2022 - Present        Hypertension ICD-10-CM: I10  ICD-9-CM: 401.9  Unknown - Present        Hyperlipidemia ICD-10-CM: E78.5  ICD-9-CM: 272.4  Unknown - Present        Erectile dysfunction ICD-10-CM: N52.9  ICD-9-CM: 607.84  Unknown - Present        Prostate cancer ([facility] [address] ICD-10-CM: C61  ICD-9-CM: 185  8/4/2011 - Present       Medications reviewed  Current Facility-Administered Medications   Medication Dose Route Frequency    insulin lispro (HUMALOG) injection   SubCUTAneous AC&HS    glucose chewable tablet 16 g  4 Tablet Oral PRN    glucagon (GLUCAGEN) injection 1 mg  1 mg IntraMUSCular PRN    sodium chloride (NS) flush 5-40 mL  5-40 mL IntraVENous Q8H    sodium chloride (NS) flush 5-40 mL  5-40 mL IntraVENous PRN    acetaminophen (TYLENOL) tablet 650 mg  650 mg Oral Q6H PRN    Or    acetaminophen (TYLENOL) suppository 650 mg  650 mg Rectal Q6H PRN    ondansetron (ZOFRAN ODT) tablet 4 mg  4 mg Oral Q6H PRN    Or    ondansetron (ZOFRAN) injection 4 mg  4 mg IntraVENous Q6H PRN    enoxaparin (LOVENOX) injection 40 mg  40 mg SubCUTAneous DAILY    dextrose 10% infusion 0-250 mL  0-250 mL IntraVENous PRN    doxycycline (VIBRAMYCIN) capsule 100 mg  100 mg Oral Q12H    nicotine (NICODERM CQ) 21 mg/24 hr patch 1 Patch  1 Patch TransDERmal DAILY    aspirin chewable tablet 81 mg  81 mg Oral DAILY    lisinopriL (PRINIVIL, ZESTRIL) tablet 40 mg  40 mg Oral DAILY    atorvastatin (LIPITOR) tablet 20 mg  20 mg Oral DAILY    alogliptin (NESINA) tablet 12.5 mg  12.5 mg Oral DAILY    And    metFORMIN (GLUCOPHAGE) tablet 1,000 mg  1,000 mg Oral DAILY    phosphorus (K PHOS NEUTRAL) 250 mg tablet 2 Tablet  2 Tablet Oral BID    insulin glargine (LANTUS) injection 20 Units  20 Units SubCUTAneous QHS    piperacillin-tazobactam (ZOSYN) 3.375 g in 0.9% sodium chloride (MBP/ADV) 100 mL MBP  3.375 g IntraVENous Q8H       Review of Systems:   A comprehensive review of systems was negative except for that written in the HPI. Objective:   Physical Exam:     Visit Vitals  /72 (BP 1 Location: Left upper arm, BP Patient Position: At rest;Supine)   Pulse 70   Temp 98.8 °F (37.1 °C)   Resp 18   Ht 5' 9\" (1.753 m)   Wt 78 kg (172 lb)   SpO2 97%   BMI 25.40 kg/m²      O2 Device: None (Room air)    Temp (24hrs), Av.9 °F (37.7 °C), Min:98.8 °F (37.1 °C), Max:100.7 °F (38.2 °C)    No intake/output data recorded.  1901 -  0700  In: 2460 [P.O.:960; I.V.:1500]  Out: 950 [Urine:950]    General:   Awake and alert   Lungs:   Crackles left lower lobe   Chest wall:  No tenderness or deformity. Heart:  Regular rate and rhythm, S1, S2 normal, no murmur, click, rub or gallop. Abdomen:   Soft, non-tender. Bowel sounds normal. No masses,  No organomegaly. Extremities: Extremities normal, atraumatic, no cyanosis or edema. Pulses: 2+ and symmetric all extremities. Skin: Skin color, texture, turgor normal. No rashes or lesions   Neurologic: CNII-XII intact. No gross focal deficits         Data Review:       Recent Days:  Recent Labs     22  0302 22  0754 22  1655   WBC 12.0* 18.0* 18.6*   HGB 10.9* 12.0* 14.5   HCT 32.5* 36.2* 42.0    203 221       Recent Labs     22  0302 22  0738 22  2218 22  1900 22  1655   * 132* 135*   < > 128*   K 3.8 3.9 3.9   < > 4.1    102 100   < > 90*   CO2 25 22 25   < > 20*   * 260* 278*   < > 624*   BUN 14 12 11   < > 13   CREA 1.19 1.35* 1.53*   < > 1.72*   CA 7.7* 8.4* 8.1*   < > 9.3   PHOS  --  1.5*  --   --   --    ALB 2.0* 2.6*  --   --  3.4*   TBILI 0.6  --   --   --  0.8   ALT 38  --   --   --  22    < > = values in this interval not displayed. No results for input(s): PH, PCO2, PO2, HCO3, FIO2 in the last 72 hours.     24 Hour Results:      CT CHEST ABD PELV WO CONT Final Result   1. Left lower lobe consolidation, concerning for pneumonia. Recommend follow-up   in 6-8 weeks. 2.  No evidence of obvious intra-abdominal infection. XR CHEST PORT   Final Result   No acute cardiopulmonary process. Assessment:  Community acquired pneumonia left lower lobe    Uncontrolled diabetes mellitus type 2 with mild DKA on presentation, now resolved    Dehydration with acute kidney injury, improved    Generalized weakness due to all of above    Tobacco abuse with suspected underlying COPD    Elevated troponin, probably due to sepsis    Sepsis with fever, tachycardia, leukocytosis and elevated procalcitonin. Hypophosphatemia    Plan:  Continue Zosyn and doxycycline  We will consider discharge tomorrow if he remains afebrile    Care Plan discussed with: Patient/Family    Disposition: Probable discharge in 24 hours    Total time spent with patient: 30 minutes.     Raghavendra Jones MD

## 2022-09-04 VITALS
WEIGHT: 172 LBS | DIASTOLIC BLOOD PRESSURE: 80 MMHG | HEART RATE: 69 BPM | TEMPERATURE: 99.1 F | BODY MASS INDEX: 25.48 KG/M2 | HEIGHT: 69 IN | RESPIRATION RATE: 16 BRPM | OXYGEN SATURATION: 100 % | SYSTOLIC BLOOD PRESSURE: 155 MMHG

## 2022-09-04 LAB
GLUCOSE BLD STRIP.AUTO-MCNC: 152 MG/DL (ref 65–100)
PERFORMED BY, TECHID: ABNORMAL

## 2022-09-04 PROCEDURE — 82962 GLUCOSE BLOOD TEST: CPT

## 2022-09-04 PROCEDURE — 74011000250 HC RX REV CODE- 250: Performed by: HOSPITALIST

## 2022-09-04 PROCEDURE — 74011000258 HC RX REV CODE- 258: Performed by: HOSPITALIST

## 2022-09-04 PROCEDURE — 74011636637 HC RX REV CODE- 636/637: Performed by: HOSPITALIST

## 2022-09-04 PROCEDURE — 74011250637 HC RX REV CODE- 250/637: Performed by: INTERNAL MEDICINE

## 2022-09-04 PROCEDURE — 74011250636 HC RX REV CODE- 250/636: Performed by: HOSPITALIST

## 2022-09-04 PROCEDURE — 74011250637 HC RX REV CODE- 250/637: Performed by: HOSPITALIST

## 2022-09-04 RX ORDER — COSYNTROPIN 0.25 MG/ML
0.25 INJECTION, POWDER, FOR SOLUTION INTRAMUSCULAR; INTRAVENOUS ONCE
Status: DISCONTINUED | OUTPATIENT
Start: 2022-09-04 | End: 2022-09-04

## 2022-09-04 RX ORDER — LEVOFLOXACIN 750 MG/1
750 TABLET ORAL DAILY
Qty: 5 TABLET | Refills: 0 | Status: SHIPPED | OUTPATIENT
Start: 2022-09-04 | End: 2022-09-20 | Stop reason: ALTCHOICE

## 2022-09-04 RX ADMIN — LISINOPRIL 40 MG: 40 TABLET ORAL at 11:19

## 2022-09-04 RX ADMIN — DOXYCYCLINE HYCLATE 100 MG: 100 CAPSULE ORAL at 11:19

## 2022-09-04 RX ADMIN — ASPIRIN 81 MG CHEWABLE TABLET 81 MG: 81 TABLET CHEWABLE at 11:19

## 2022-09-04 RX ADMIN — SODIUM CHLORIDE, PRESERVATIVE FREE 10 ML: 5 INJECTION INTRAVENOUS at 05:18

## 2022-09-04 RX ADMIN — ENOXAPARIN SODIUM 40 MG: 100 INJECTION SUBCUTANEOUS at 11:19

## 2022-09-04 RX ADMIN — METFORMIN HYDROCHLORIDE 1000 MG: 500 TABLET ORAL at 11:19

## 2022-09-04 RX ADMIN — INSULIN LISPRO 2 UNITS: 100 INJECTION, SOLUTION INTRAVENOUS; SUBCUTANEOUS at 11:19

## 2022-09-04 RX ADMIN — ATORVASTATIN CALCIUM 20 MG: 20 TABLET, FILM COATED ORAL at 11:19

## 2022-09-04 RX ADMIN — DIBASIC SODIUM PHOSPHATE, MONOBASIC POTASSIUM PHOSPHATE AND MONOBASIC SODIUM PHOSPHATE 2 TABLET: 852; 155; 130 TABLET ORAL at 11:19

## 2022-09-04 RX ADMIN — ALOGLIPTIN 12.5 MG: 12.5 TABLET, FILM COATED ORAL at 11:19

## 2022-09-04 RX ADMIN — PIPERACILLIN AND TAZOBACTAM 3.38 G: 3; .375 INJECTION, POWDER, FOR SOLUTION INTRAVENOUS at 05:18

## 2022-09-04 NOTE — PROGRESS NOTES
Problem: Pressure Injury - Risk of  Goal: *Prevention of pressure injury  Description: Document Bryn Scale and appropriate interventions in the flowsheet. Outcome: Progressing Towards Goal  Note: Pressure Injury Interventions:  Sensory Interventions: Assess changes in LOC    Moisture Interventions: Absorbent underpads    Activity Interventions: PT/OT evaluation    Mobility Interventions: HOB 30 degrees or less    Nutrition Interventions: Document food/fluid/supplement intake    Friction and Shear Interventions: HOB 30 degrees or less                Problem: Patient Education: Go to Patient Education Activity  Goal: Patient/Family Education  Outcome: Progressing Towards Goal     Problem: Falls - Risk of  Goal: *Absence of Falls  Description: Document Melba Fall Risk and appropriate interventions in the flowsheet.   Outcome: Progressing Towards Goal  Note: Fall Risk Interventions:  Mobility Interventions: Bed/chair exit alarm         Medication Interventions: Bed/chair exit alarm    Elimination Interventions: Call light in reach, Bed/chair exit alarm    History of Falls Interventions: Bed/chair exit alarm         Problem: Patient Education: Go to Patient Education Activity  Goal: Patient/Family Education  Outcome: Progressing Towards Goal     Problem: Patient Education: Go to Patient Education Activity  Goal: Patient/Family Education  Outcome: Progressing Towards Goal     Problem: Patient Education: Go to Patient Education Activity  Goal: Patient/Family Education  Outcome: Progressing Towards Goal

## 2022-09-04 NOTE — DISCHARGE SUMMARY
Physician Discharge Summary     Patient ID:    Haley Padilla  [id number]  26 y.o.  1955    Admit date: 8/31/2022    Discharge date : 9/4/2022    Chronic Diagnoses:    Problem List as of 9/4/2022 Date Reviewed: 9/1/2022            Codes Class Noted - Resolved    DKA (diabetic ketoacidosis) ([facility] ICD-10-CM: E11.10  ICD-9-CM: 250.12  8/31/2022 - Present        Elevated troponin ICD-10-CM: R77.8  ICD-9-CM: 790.6  8/31/2022 - Present        Glycosuria ICD-10-CM: R81  ICD-9-CM: 791.5  2/28/2022 - Present        Nocturia ICD-10-CM: R35.1  ICD-9-CM: 788.43  2/15/2022 - Present        Tobacco dependence ICD-10-CM: F17.200  ICD-9-CM: 305.1  2/15/2022 - Present        Uncontrolled type 2 diabetes mellitus with hyperglycemia ([facility] ICD-10-CM: E11.65  ICD-9-CM: 250.02  2/9/2022 - Present        Benign essential HTN ICD-10-CM: I10  ICD-9-CM: 401.1  2/9/2022 - Present        Mixed hyperlipidemia ICD-10-CM: E78.2  ICD-9-CM: 272.2  2/9/2022 - Present        Hypertension ICD-10-CM: I10  ICD-9-CM: 401.9  Unknown - Present        Hyperlipidemia ICD-10-CM: E78.5  ICD-9-CM: 272.4  Unknown - Present        Erectile dysfunction ICD-10-CM: N52.9  ICD-9-CM: 607.84  Unknown - Present        Prostate cancer ([facility] ICD-10-CM: C61  ICD-9-CM: 185  8/4/2011 - Present       22    Final Diagnoses:   DKA (diabetic ketoacidosis) ([facility] [E11.10]  Elevated troponin [R77.8]  Community acquired pneumonia left lower lobe, present on admission    Uncontrolled diabetes mellitus type 2 with mild DKA on presentation, now resolved    Dehydration with acute kidney injury, improved    Generalized weakness due to all of above    Tobacco abuse with suspected underlying COPD     Elevated troponin, probably due to sepsis     Sepsis with fever, tachycardia, leukocytosis and elevated procalcitonin.       Hypophosphatemia       Reason for Hospitalization:  38-year-old male with a history of hypertension, prostate cancer, and diabetes who presented to the ED on 8/31 with severe generalized weakness for several days. He was complaining of urinary frequency and polydipsia. Patient had been given a recent prescription for insulin but had not been taking it recently. Patient also noted to have a fever of 102 and testing for COVID still pending. Chest x-ray was clear and patient was not hypoxic. Initial labs showed mild DKA with a bicarb of 17 and a gap of 16. He had a white count of 18,000. He was started on IV fluids and subcutaneous insulin and within 3 hours his DKA had already resolved. Patient was admitted to the floor. Patient was also noted to have an elevated troponin of 265 on admission. Repeat was [address] Course:   Was admitted to medical telemetry. Patient was started empirically on ceftriaxone and doxycycline, then transitioned to Zosyn and doxycycline    He continued to spike fevers for the first several days of his admission. I obtained a CT of the chest, abdomen pelvis. Chest CT did demonstrate a rather significant left lower lobe infiltrate which appears to be the source for his fever    Patient defervesced and clinically improved. White count improved. Blood cultures came back negative    He was seen by cardiology who recommended conservative management echo was unremarkable    Patient was felt stable for discharge home on 9/4          Discharge Medications:   Current Discharge Medication List        START taking these medications    Details   levoFLOXacin (Levaquin) 750 mg tablet Take 1 Tablet by mouth daily. Qty: 5 Tablet, Refills: 0  Start date: 9/4/2022           CONTINUE these medications which have NOT CHANGED    Details   atorvastatin (LIPITOR) 20 mg tablet Take 1 Tablet by mouth daily. Qty: 90 Tablet, Refills: 1    Associated Diagnoses: Mixed hyperlipidemia      Janumet -1,000 mg TM24 TAKE 1 TABLET BY MOUTH DAILY.  STOP JANUMET XR     Associated Diagnoses: Type 2 diabetes mellitus with hyperglycemia, with long-term current use of insulin ([facility])      lisinopriL (PRINIVIL, ZESTRIL) 40 mg tablet Take 1 Tablet by mouth daily. Qty: 90 Tablet, Refills: 1    Associated Diagnoses: Primary hypertension      aspirin 81 mg chewable tablet Take 81 mg by mouth daily. ibuprofen (MOTRIN) 600 mg tablet TAKE 1 TABLET BY MOUTH EVERY 6 HOURS AS NEEDED FOR PAIN  Qty: 90 Tablet, Refills: 5    Associated Diagnoses: Pain      insulin glargine U-300 conc (Toujeo SoloStar U-300 Insulin) 300 unit/mL (1.5 mL) inpn pen Inject 20 units every morning  Qty: 2 Adjustable Dose Pre-filled Pen Syringe, Refills: 3    Comments: Dose decrease  Associated Diagnoses: Uncontrolled type 2 diabetes mellitus with hyperglycemia ([facility])      Insulin Needles, Disposable, 31 gauge x 5/16\" ndle Once a day  Qty: 100 Each, Refills: 3    Associated Diagnoses: Uncontrolled type 2 diabetes mellitus with hyperglycemia ([facility])      ammonium lactate (AMLACTIN) 12 % topical cream Apply  to affected area two (2) times a day. rub in to affected area well  Qty: 280 g, Refills: 3      !! OneTouch Delica Plus Lancet 33 gauge misc USE TO TEST BLOOD GLUCOSE THREE TIMES DAILY      !! lancets misc Test 3 times daily. Dx code E11.65  Insurance brand of choice  Qty: 300 Each, Refills: 6    Associated Diagnoses: Controlled type 2 diabetes mellitus without complication, without long-term current use of insulin ([facility])      Blood-Glucose Meter monitoring kit Finger stick glucose TID DX E11.65  Insurance brand of choice. Qty: 1 Kit, Refills: 0    Associated Diagnoses: Controlled type 2 diabetes mellitus without complication, without long-term current use of insulin ([facility]      ! ! glucose blood VI test strips (ASCENSIA AUTODISC VI, ONE TOUCH ULTRA TEST VI) strip Finger stick glucose TID DX E11.65  Insurance brand of choice.   Qty: 300 Strip, Refills: 3    Associated Diagnoses: Controlled type 2 diabetes mellitus without complication, without long-term current use of insulin ([address]      ! ! glucose blood VI test strips (ONETOUCH VERIO) strip Test 3 times daily. Dx code 250.00  Qty: 100 Strip, Refills: 6       !! - Potential duplicate medications found. Please discuss with provider. Follow up Care:    1. Marlyn Scott NP in 1-2 weeks. Please call to set up an appointment shortly after discharge. Diet:  Cardiac Diet    Disposition:  Home. Advanced Directive:   FULL    DNR      Discharge Exam:  General:  Alert, cooperative, no distress, appears stated age. Lungs:   Clear to auscultation bilaterally. Chest wall:  No tenderness or deformity. Heart:  Regular rate and rhythm, S1, S2 normal, no murmur, click, rub or gallop. Abdomen:   Soft, non-tender. Bowel sounds normal. No masses,  No organomegaly. Extremities: Extremities normal, atraumatic, no cyanosis or edema. Pulses: 2+ and symmetric all extremities. Skin: Skin color, texture, turgor normal. No rashes or lesions   Neurologic: CNII-XII intact. No gross sensory or motor deficits        CONSULTATIONS: Cardiology    Significant Diagnostic Studies:   8/31/2022: BUN 13 mg/dL (Ref range: 6 - 20 mg/dL); BUN 11 mg/dL (Ref range: 6 - 20 mg/dL); BUN 11 mg/dL (Ref range: 6 - 20 mg/dL); Calcium 9.3 mg/dL (Ref range: 8.5 - 10.1 mg/dL); Calcium 8.0 mg/dL (L; Ref range: 8.5 - 10.1 mg/dL); Calcium 8.1 mg/dL (L; Ref range: 8.5 - 10.1 mg/dL); CO2 20 mmol/L (L; Ref range: 21 - 32 mmol/L); CO2 17 mmol/L (L; Ref range: 21 - 32 mmol/L); CO2 25 mmol/L (Ref range: 21 - 32 mmol/L); Creatinine 1.72 mg/dL (H; Ref range: 0.70 - 1.30 mg/dL); Creatinine 1.55 mg/dL (H; Ref range: 0.70 - 1.30 mg/dL); Creatinine 1.53 mg/dL (H; Ref range: 0.70 - 1.30 mg/dL); Glucose 624 mg/dL (HH; Ref range: 65 - 100 mg/dL); Glucose 342 mg/dL (H; Ref range: 65 - 100 mg/dL); Glucose 278 mg/dL (H; Ref range: 65 - 100 mg/dL); HCT 42.0 % (Ref range: 36.6 - 50.3 %); HGB 14.5 g/dL (Ref range: 12.1 - 17.0 g/dL);  Potassium 4.1 mmol/L (Ref range: 3.5 - 5.1 mmol/L); Potassium 3.9 mmol/L (Ref range: 3.5 - 5.1 mmol/L); Potassium 3.9 mmol/L (Ref range: 3.5 - 5.1 mmol/L); Sodium 128 mmol/L (L; Ref range: 136 - 145 mmol/L); Sodium 133 mmol/L (L; Ref range: 136 - 145 mmol/L); Sodium 135 mmol/L (L; Ref range: 136 - 145 mmol/L)  9/1/2022: BUN 12 mg/dL (Ref range: 6 - 20 mg/dL); Calcium 8.4 mg/dL (L; Ref range: 8.5 - 10.1 mg/dL); CO2 22 mmol/L (Ref range: 21 - 32 mmol/L); Creatinine 1.35 mg/dL (H; Ref range: 0.70 - 1.30 mg/dL); Glucose 260 mg/dL (H; Ref range: 65 - 100 mg/dL); HCT 36.2 % (L; Ref range: 36.6 - 50.3 %); HGB 12.0 g/dL (L; Ref range: 12.1 - 17.0 g/dL); Potassium 3.9 mmol/L (Ref range: 3.5 - 5.1 mmol/L); Sodium 132 mmol/L (L; Ref range: 136 - 145 mmol/L)  Recent Labs     09/02/22 0302   WBC 12.0*   HGB 10.9*   HCT 32.5*        Recent Labs     09/02/22 0302   *   K 3.8      CO2 25   BUN 14   CREA 1.19   *   CA 7.7*     Recent Labs     09/02/22 0302   ALT 38   AP 54   TBILI 0.6   TP 5.1*   ALB 2.0*   GLOB 3.1     No results for input(s): INR, PTP, APTT, INREXT in the last 72 hours. No results for input(s): FE, TIBC, PSAT, FERR in the last 72 hours. No results for input(s): PH, PCO2, PO2 in the last 72 hours. No results for input(s): CPK, CKMB in the last 72 hours.     No lab exists for component: TROPONINI  Lab Results   Component Value Date/Time    Glucose (POC) 152 (H) 09/04/2022 07:35 AM    Glucose (POC) 102 (H) 09/03/2022 07:49 PM    Glucose (POC) 164 (H) 09/03/2022 03:33 PM    Glucose (POC) 216 (H) 09/03/2022 10:59 AM    Glucose (POC) 139 (H) 09/03/2022 07:14 AM       Discharge time spent 35 minutes    Signed:  Rhianna Ramos MD  9/4/2022  8:58 AM

## 2022-09-04 NOTE — PROGRESS NOTES
DC order noted. Chart reviewed. Pt has been recommended Home PT/OT. He has been accepted for post hospital care needs by [facility]. Spoke with Attending regarding DC option. DC modified as ordered. DC order and other documentation faxed via [facility] to [facility]. Per Lauro Moore RN, the pt has a walker for his use at home already. Medicare pt has received and verbalized understanding of 2nd IM letter informing them of their right to appeal the discharge. Co-Signed copy filed in Med Rec Dept. No other intervention required for this discharge. Please call [phone] if status changes and assist is needed.

## 2022-09-06 NOTE — TELEPHONE ENCOUNTER
Najma Umana from [facility] [phone] would like to know if Adelaida Khan will follow this patient since [facility] sent to referral to them for care. Please call Antonio and advise. Jaspreet would like to talk to nurse today if possible about the above.

## 2022-09-08 LAB
BACTERIA SPEC CULT: NORMAL
BACTERIA SPEC CULT: NORMAL
SPECIAL REQUESTS,SREQ: NORMAL
SPECIAL REQUESTS,SREQ: NORMAL

## 2022-09-08 NOTE — TELEPHONE ENCOUNTER
Pt last seen on 3/22/22 and has no future appts. Will defer to provider to provide decision on whether not she will follow this.

## 2022-09-09 NOTE — TELEPHONE ENCOUNTER
Called and spoke w/ [facility] and provided the following message per provider:  Diamante Dong, NP  You 23 hours ago (3:40 PM)     Kyle Camara  I could sign the orders but only if pt makes an appt to be seen. He is supposed to be seen in 1-2 weeks. I don't think I have any appt so would suggest he establish at another office to be seen sooner. They verbalized understanding and will notify pt.

## 2022-09-20 ENCOUNTER — OFFICE VISIT (OUTPATIENT)
Dept: FAMILY MEDICINE CLINIC | Age: 67
End: 2022-09-20
Payer: MEDICARE

## 2022-09-20 VITALS
SYSTOLIC BLOOD PRESSURE: 154 MMHG | OXYGEN SATURATION: 99 % | TEMPERATURE: 97.8 F | DIASTOLIC BLOOD PRESSURE: 87 MMHG | HEIGHT: 69 IN | WEIGHT: 159 LBS | HEART RATE: 90 BPM | RESPIRATION RATE: 18 BRPM | BODY MASS INDEX: 23.55 KG/M2

## 2022-09-20 DIAGNOSIS — I10 PRIMARY HYPERTENSION: ICD-10-CM

## 2022-09-20 DIAGNOSIS — Z87.891 PERSONAL HISTORY OF TOBACCO USE, PRESENTING HAZARDS TO HEALTH: ICD-10-CM

## 2022-09-20 DIAGNOSIS — E78.2 MIXED HYPERLIPIDEMIA: ICD-10-CM

## 2022-09-20 DIAGNOSIS — Z79.4 TYPE 2 DIABETES MELLITUS WITH HYPERGLYCEMIA, WITH LONG-TERM CURRENT USE OF INSULIN (HCC): ICD-10-CM

## 2022-09-20 DIAGNOSIS — Z11.59 NEED FOR HEPATITIS C SCREENING TEST: ICD-10-CM

## 2022-09-20 DIAGNOSIS — Z09 HOSPITAL DISCHARGE FOLLOW-UP: Primary | ICD-10-CM

## 2022-09-20 DIAGNOSIS — E11.65 UNCONTROLLED TYPE 2 DIABETES MELLITUS WITH HYPERGLYCEMIA (HCC): ICD-10-CM

## 2022-09-20 DIAGNOSIS — E11.65 TYPE 2 DIABETES MELLITUS WITH HYPERGLYCEMIA, WITH LONG-TERM CURRENT USE OF INSULIN (HCC): ICD-10-CM

## 2022-09-20 PROCEDURE — G8536 NO DOC ELDER MAL SCRN: HCPCS | Performed by: NURSE PRACTITIONER

## 2022-09-20 PROCEDURE — G0008 ADMIN INFLUENZA VIRUS VAC: HCPCS | Performed by: NURSE PRACTITIONER

## 2022-09-20 PROCEDURE — G8427 DOCREV CUR MEDS BY ELIG CLIN: HCPCS | Performed by: NURSE PRACTITIONER

## 2022-09-20 PROCEDURE — G8420 CALC BMI NORM PARAMETERS: HCPCS | Performed by: NURSE PRACTITIONER

## 2022-09-20 PROCEDURE — 99214 OFFICE O/P EST MOD 30 MIN: CPT | Performed by: NURSE PRACTITIONER

## 2022-09-20 PROCEDURE — 3017F COLORECTAL CA SCREEN DOC REV: CPT | Performed by: NURSE PRACTITIONER

## 2022-09-20 PROCEDURE — 1123F ACP DISCUSS/DSCN MKR DOCD: CPT | Performed by: NURSE PRACTITIONER

## 2022-09-20 PROCEDURE — 1111F DSCHRG MED/CURRENT MED MERGE: CPT | Performed by: NURSE PRACTITIONER

## 2022-09-20 PROCEDURE — 3046F HEMOGLOBIN A1C LEVEL >9.0%: CPT | Performed by: NURSE PRACTITIONER

## 2022-09-20 PROCEDURE — G8432 DEP SCR NOT DOC, RNG: HCPCS | Performed by: NURSE PRACTITIONER

## 2022-09-20 PROCEDURE — G8754 DIAS BP LESS 90: HCPCS | Performed by: NURSE PRACTITIONER

## 2022-09-20 PROCEDURE — 90694 VACC AIIV4 NO PRSRV 0.5ML IM: CPT | Performed by: NURSE PRACTITIONER

## 2022-09-20 PROCEDURE — 2022F DILAT RTA XM EVC RTNOPTHY: CPT | Performed by: NURSE PRACTITIONER

## 2022-09-20 PROCEDURE — G8753 SYS BP > OR = 140: HCPCS | Performed by: NURSE PRACTITIONER

## 2022-09-20 PROCEDURE — 1101F PT FALLS ASSESS-DOCD LE1/YR: CPT | Performed by: NURSE PRACTITIONER

## 2022-09-20 RX ORDER — FLASH GLUCOSE SENSOR
1 KIT MISCELLANEOUS
Qty: 6 KIT | Refills: 3 | Status: SHIPPED | OUTPATIENT
Start: 2022-09-20

## 2022-09-20 RX ORDER — SITAGLIPTIN AND METFORMIN HYDROCHLORIDE 100; 1000 MG/1; MG/1
TABLET, FILM COATED, EXTENDED RELEASE ORAL
Qty: 90 TABLET | Refills: 1 | Status: SHIPPED | OUTPATIENT
Start: 2022-09-20

## 2022-09-20 RX ORDER — INSULIN GLARGINE 300 U/ML
INJECTION, SOLUTION SUBCUTANEOUS
Qty: 2 ADJUSTABLE DOSE PRE-FILLED PEN SYRINGE | Refills: 3 | Status: SHIPPED | OUTPATIENT
Start: 2022-09-20 | End: 2022-10-18 | Stop reason: SDUPTHER

## 2022-09-20 NOTE — PROGRESS NOTES
Transitional Care Management Progress Note    Patient: Mary Grace Lee  : 1955  PCP: Althea Webster NP    Date of office visit: 2022   Date of admission: 22  Date of discharge: 22  Hospital: [address] [place] [address]    Call initiated w/i 2 business dates of discharge: Yes  Date of the most recent call to the patient: 22     Assessment/Plan:   Diagnoses and all orders for this visit:    1. Hospital discharge follow-up  -     PA DISCHARGE MEDS RECONCILED W/ CURRENT OUTPATIENT MED LIST  -     CBC WITH AUTOMATED DIFF  -     METABOLIC PANEL, COMPREHENSIVE  -     LIPID PANEL  -     THYROID CASCADE PROFILE  -     INFLUENZA, FLUAD, (AGE 65 Y+), IM, PF, 0.5 ML    2. Type 2 diabetes mellitus with hyperglycemia, with long-term current use of insulin (HCC)  -     CBC WITH AUTOMATED DIFF  -     METABOLIC PANEL, COMPREHENSIVE  -     LIPID PANEL  -     THYROID CASCADE PROFILE  -     MICROALBUMIN, UR, RAND W/ MICROALB/CREAT RATIO  -     HEMOGLOBIN A1C WITH EAG  -     REFERRAL TO PHARMACIST  -     flash glucose sensor (FreeStyle Joselin 2 Sensor) kit; 1 Each by Does Not Apply route every fourteen (14) days. Use for daily glucose testing. Dx: E11.9  -     Janumet -1,000 mg TM24; TAKE 1 TABLET BY MOUTH DAILY    3. Primary hypertension  -     CBC WITH AUTOMATED DIFF  -     METABOLIC PANEL, COMPREHENSIVE  -     LIPID PANEL  -     THYROID CASCADE PROFILE  -     MICROALBUMIN, UR, RAND W/ MICROALB/CREAT RATIO  -     REFERRAL TO PHARMACIST    4. Mixed hyperlipidemia  -     CBC WITH AUTOMATED DIFF  -     METABOLIC PANEL, COMPREHENSIVE  -     LIPID PANEL  -     THYROID CASCADE PROFILE    5. Uncontrolled type 2 diabetes mellitus with hyperglycemia (HCC)  -     insulin glargine U-300 conc (Toujeo SoloStar U-300 Insulin) 300 unit/mL (1.5 mL) inpn pen; Inject 20 units every morning    6. Need for hepatitis C screening test  -     HEPATITIS C AB; Future    7.  Personal history of tobacco use, presenting hazards to health  -     CT LOW DOSE LUNG CANCER SCREENING; Future      The complexity of medical decision making for this patient's transitional care is high   Follow-up and Dispositions    Return in about 4 weeks (around 10/18/2022) for DM, Lab review. Subjective:   Perri Olson is a 69 y.o. male presenting today for follow-up after hospital discharge. This encounter and supporting documentation was reviewed if available. Medication reconciliation was performed today. The main problem requiring admission was DKA. Complications during admission: none      Interval history/Current status: Patient was suffering from elevated glucose levels have caused him to be dizzy and feel ill went to the hospital was treated with IV insulin as well as IV fluids to hydrate and was sent home. Patient reports since being home he has been difficult for him to check his on glucose level as he has a difficult time sticking himself. Admitting symptoms have: improved      Medications marked \"taking\" at this time:  Prior to Admission medications    Medication Sig Start Date End Date Taking? Authorizing Provider   Janumet -1,000 mg TM24 TAKE 1 TABLET BY MOUTH DAILY 9/9/22  Yes Amber Garcia NP   atorvastatin (LIPITOR) 20 mg tablet Take 1 Tablet by mouth daily. 8/30/22  Yes Amber Garcia NP   ibuprofen (MOTRIN) 600 mg tablet TAKE 1 TABLET BY MOUTH EVERY 6 HOURS AS NEEDED FOR PAIN 5/17/22  Yes Catina Farris NP   lisinopriL (PRINIVIL, ZESTRIL) 40 mg tablet Take 1 Tablet by mouth daily. 3/22/22  Yes Amber Garcia NP   Insulin Needles, Disposable, 31 gauge x 5/16\" ndle Once a day 3/18/22  Yes MD Vince Washington Plus Lancet 33 gauge misc USE TO TEST BLOOD GLUCOSE THREE TIMES DAILY 12/22/21  Yes Provider, Historical   lancets misc Test 3 times daily.  Dx code E11.65  Insurance brand of choice 12/22/21  Yes Jw Santos DO   Blood-Glucose Meter monitoring kit Finger stick glucose TID DX E11.65  Insurance brand of choice. 12/22/21  Yes Carlo Blount, DO   glucose blood VI test strips (ASCENSIA AUTODISC VI, ONE TOUCH ULTRA TEST VI) strip Finger stick glucose TID DX E11.65  Insurance brand of choice. 12/22/21  Yes Carlo Blount, DO   aspirin 81 mg chewable tablet Take 81 mg by mouth daily. Yes Provider, Historical   glucose blood VI test strips (ONETOUCH VERIO) strip Test 3 times daily. Dx code 250.00 4/29/15  Yes Troy Chaves MD   levoFLOXacin (Levaquin) 750 mg tablet Take 1 Tablet by mouth daily. Patient not taking: Reported on 9/20/2022 9/4/22   Benji Iqbal MD   insulin glargine U-300 conc (Toujeo SoloStar U-300 Insulin) 300 unit/mL (1.5 mL) inpn pen Inject 20 units every morning  Patient not taking: No sig reported 3/18/22   Elaine Naylor MD   ammonium lactate (AMLACTIN) 12 % topical cream Apply  to affected area two (2) times a day.  rub in to affected area well  Patient not taking: Reported on 9/20/2022 3/3/22   Manuelito Do DPM        ROS:  Negative except noted on HPI      Patient Active Problem List   Diagnosis Code    Prostate cancer ([facility] Alvie Dayami    Hypertension I10    Hyperlipidemia E78.5    Erectile dysfunction N52.9    Uncontrolled type 2 diabetes mellitus with hyperglycemia ([facility] E11.65    Benign essential HTN I10    Mixed hyperlipidemia E78.2    Nocturia R35.1    Tobacco dependence F17.200    Glycosuria R81    DKA (diabetic ketoacidosis) ([facility]) E11.10    Elevated troponin R77.8     Patient Active Problem List    Diagnosis Date Noted    DKA (diabetic ketoacidosis) ([facility] 08/31/2022    Elevated troponin 08/31/2022    Glycosuria 02/28/2022    Nocturia 02/15/2022    Tobacco dependence 02/15/2022    Uncontrolled type 2 diabetes mellitus with hyperglycemia ([facility] 02/09/2022    Benign essential HTN 02/09/2022    Mixed hyperlipidemia 02/09/2022    Hypertension     Hyperlipidemia     Erectile dysfunction     Prostate cancer ([facility] 08/04/2011     Current Outpatient Medications   Medication Sig Dispense Refill    flash glucose sensor (FreeStyle Joselin 2 Sensor) kit 1 Each by Does Not Apply route every fourteen (14) days. Use for daily glucose testing. Dx: E11.9 6 Kit 3    insulin glargine U-300 conc (Toujeo SoloStar U-300 Insulin) 300 unit/mL (1.5 mL) inpn pen Inject 20 units every morning 2 Adjustable Dose Pre-filled Pen Syringe 3    Janumet -1,000 mg TM24 TAKE 1 TABLET BY MOUTH DAILY 90 Tablet 1    atorvastatin (LIPITOR) 20 mg tablet Take 1 Tablet by mouth daily. 90 Tablet 1    ibuprofen (MOTRIN) 600 mg tablet TAKE 1 TABLET BY MOUTH EVERY 6 HOURS AS NEEDED FOR PAIN 90 Tablet 5    lisinopriL (PRINIVIL, ZESTRIL) 40 mg tablet Take 1 Tablet by mouth daily. 90 Tablet 1    Insulin Needles, Disposable, 31 gauge x 5/16\" ndle Once a day 100 Each 3    OneTouch Delica Plus Lancet 33 gauge misc USE TO TEST BLOOD GLUCOSE THREE TIMES DAILY      lancets misc Test 3 times daily. Dx code E11.65  Insurance brand of choice 300 Each 6    Blood-Glucose Meter monitoring kit Finger stick glucose TID DX E11.65  Insurance brand of choice. 1 Kit 0    glucose blood VI test strips (ASCENSIA AUTODISC VI, ONE TOUCH ULTRA TEST VI) strip Finger stick glucose TID DX E11.65  Insurance brand of choice. 300 Strip 3    aspirin 81 mg chewable tablet Take 81 mg by mouth daily. glucose blood VI test strips (ONETOUCH VERIO) strip Test 3 times daily. Dx code 250.00 100 Strip 6    ammonium lactate (AMLACTIN) 12 % topical cream Apply  to affected area two (2) times a day.  rub in to affected area well (Patient not taking: Reported on 9/20/2022) 280 g 3     No Known Allergies  Past Medical History:   Diagnosis Date    Cancer [facility])     prostate    Colloid cyst of brain ([address] 06/2011    S/P excision cyst at [place] [address] center    Constipation     Diabetes [facility])     IDDM    Erectile dysfunction     GERD (gastroesophageal reflux disease)     Hyperlipidemia     Hypertension     Migraines      Past Surgical History:   Procedure Laterality Date    HX GI 2012    HX HEENT  2006    exc mass right neck    HX OTHER SURGICAL  2012    brain    HX UROLOGICAL      prostate bx    NEUROLOGICAL PROCEDURE UNLISTED  6-2011    removal cranial colloid cyst at [facility]     Family History   Problem Relation Age of Onset    Diabetes Mother     Heart Disease Mother     Stroke Mother     Heart Disease Father     Stroke Father     Hypertension Brother     Elevated Lipids Brother      Social History     Tobacco Use    Smoking status: Every Day     Packs/day: 0.50     Years: 53.00     Pack years: 26.50     Types: Cigarettes    Smokeless tobacco: Never    Tobacco comments:     Started smoking at age 11   Substance Use Topics    Alcohol use: Not Currently          Objective:   Visit Vitals  BP (!) 154/87 (BP 1 Location: Left arm, BP Patient Position: Sitting)   Pulse 90   Temp 97.8 °F (36.6 °C) (Temporal)   Resp 18   Ht 5' 9\" (1.753 m)   Wt 159 lb (72.1 kg)   SpO2 99%   BMI 23.48 kg/m²        Physical Examination: General appearance - alert, well appearing, and in no distress  Mental status - alert, oriented to person, place, and time  Mouth - mucous membranes moist, pharynx normal without lesions  Lymphatics - no palpable lymphadenopathy, no hepatosplenomegaly  Chest - clear to auscultation, no wheezes, rales or rhonchi, symmetric air entry  Heart - normal rate, regular rhythm, normal S1, S2, no murmurs, rubs, clicks or gallops  Abdomen - soft, nontender, nondistended, no masses or organomegaly  Musculoskeletal - no joint tenderness, deformity or swelling  Extremities - peripheral pulses normal, no pedal edema, no clubbing or cyanosis  Skin - normal coloration and turgor, no rashes, no suspicious skin lesions noted       We discussed the expected course, resolution and complications of the diagnosis(es) in detail. Medication risks, benefits, costs, interactions, and alternatives were discussed as indicated.   I advised him to contact the office if his condition worsens, changes or fails to improve as anticipated. He expressed understanding with the diagnosis(es) and plan.      Bernard Tran, NP

## 2022-09-20 NOTE — PROGRESS NOTES
Chief Complaint   Patient presents with    [facility] follow up, pt was discharged on the Sep. 4th, Elevated sugar levels          Visit Vitals  BP (!) 154/87 (BP 1 Location: Left arm, BP Patient Position: Sitting)   Pulse 90   Temp 97.8 °F (36.6 °C) (Temporal)   Resp 18   Ht 5' 9\" (1.753 m)   Wt 159 lb (72.1 kg)   SpO2 99%   BMI 23.48 kg/m²       1. Have you been to the ER, urgent care clinic since your last visit? Hospitalized since your last visit? [facility] elevated Sugar     2. Have you seen or consulted any other health care providers outside of the [address] [place] [address] since your last visit? Include any pap smears or colon screening.  No

## 2022-09-22 LAB
ALBUMIN SERPL-MCNC: 3.8 G/DL (ref 3.8–4.8)
ALBUMIN/CREAT UR: 7 MG/G CREAT (ref 0–29)
ALBUMIN/GLOB SERPL: 1.4 {RATIO} (ref 1.2–2.2)
ALP SERPL-CCNC: 78 IU/L (ref 44–121)
ALT SERPL-CCNC: 59 IU/L (ref 0–44)
AST SERPL-CCNC: 37 IU/L (ref 0–40)
BASOPHILS # BLD AUTO: 0.1 X10E3/UL (ref 0–0.2)
BASOPHILS NFR BLD AUTO: 1 %
BILIRUB SERPL-MCNC: 0.3 MG/DL (ref 0–1.2)
BUN SERPL-MCNC: 14 MG/DL (ref 8–27)
BUN/CREAT SERPL: 11 (ref 10–24)
CALCIUM SERPL-MCNC: 9.5 MG/DL (ref 8.6–10.2)
CHLORIDE SERPL-SCNC: 97 MMOL/L (ref 96–106)
CHOLEST SERPL-MCNC: 169 MG/DL (ref 100–199)
CO2 SERPL-SCNC: 24 MMOL/L (ref 20–29)
CREAT SERPL-MCNC: 1.3 MG/DL (ref 0.76–1.27)
CREAT UR-MCNC: 137.1 MG/DL
EGFR: 59 ML/MIN/1.73
EOSINOPHIL # BLD AUTO: 0.1 X10E3/UL (ref 0–0.4)
EOSINOPHIL NFR BLD AUTO: 2 %
ERYTHROCYTE [DISTWIDTH] IN BLOOD BY AUTOMATED COUNT: 13.3 % (ref 11.6–15.4)
EST. AVERAGE GLUCOSE BLD GHB EST-MCNC: 329 MG/DL
GLOBULIN SER CALC-MCNC: 2.7 G/DL (ref 1.5–4.5)
GLUCOSE SERPL-MCNC: 226 MG/DL (ref 65–99)
HBA1C MFR BLD: 13.1 % (ref 4.8–5.6)
HCT VFR BLD AUTO: 40.8 % (ref 37.5–51)
HDLC SERPL-MCNC: 59 MG/DL
HGB BLD-MCNC: 13 G/DL (ref 13–17.7)
IMM GRANULOCYTES # BLD AUTO: 0 X10E3/UL (ref 0–0.1)
IMM GRANULOCYTES NFR BLD AUTO: 0 %
LDLC SERPL CALC-MCNC: 88 MG/DL (ref 0–99)
LYMPHOCYTES # BLD AUTO: 2.8 X10E3/UL (ref 0.7–3.1)
LYMPHOCYTES NFR BLD AUTO: 41 %
MCH RBC QN AUTO: 28.4 PG (ref 26.6–33)
MCHC RBC AUTO-ENTMCNC: 31.9 G/DL (ref 31.5–35.7)
MCV RBC AUTO: 89 FL (ref 79–97)
MICROALBUMIN UR-MCNC: 9 UG/ML
MONOCYTES # BLD AUTO: 0.6 X10E3/UL (ref 0.1–0.9)
MONOCYTES NFR BLD AUTO: 9 %
NEUTROPHILS # BLD AUTO: 3.1 X10E3/UL (ref 1.4–7)
NEUTROPHILS NFR BLD AUTO: 47 %
PLATELET # BLD AUTO: 385 X10E3/UL (ref 150–450)
POTASSIUM SERPL-SCNC: 5 MMOL/L (ref 3.5–5.2)
PROT SERPL-MCNC: 6.5 G/DL (ref 6–8.5)
RBC # BLD AUTO: 4.58 X10E6/UL (ref 4.14–5.8)
SODIUM SERPL-SCNC: 136 MMOL/L (ref 134–144)
TRIGL SERPL-MCNC: 128 MG/DL (ref 0–149)
TSH SERPL DL<=0.005 MIU/L-ACNC: 0.47 UIU/ML (ref 0.45–4.5)
VLDLC SERPL CALC-MCNC: 22 MG/DL (ref 5–40)
WBC # BLD AUTO: 6.7 X10E3/UL (ref 3.4–10.8)

## 2022-09-25 NOTE — PROGRESS NOTES
A1c higher than previous, continue with insulin glargine U-300 conc (Toujeo SoloStar U-300 Insulin) 300 unit/mL (1.5 mL) inpn pen 20 units in the morning, and add 10 units at bedtime. We will recheck at follow-up visit.

## 2022-09-26 NOTE — PROGRESS NOTES
Called patient on preferred bnumber listed in the chart, [phone] (cell). This number is no longer in service. Patient's home number was then called, [phone], unable to LVM on this number at this time.

## 2022-09-28 NOTE — PROGRESS NOTES
Physician Progress Note      PATIENT:               Jaclyn Rodríguez  CSN #:                  [id number]  :                       1955  ADMIT DATE:       2022 4:39 PM  [address] [place] DATE:        2022 3:40 PM  RESPONDING  PROVIDER #:        Monika Ugarte MD          QUERY TEXT:    Dr. Ananth Tracy,  Pt admitted with DKA. Pt noted to have pneumonia. If possible, please document in progress notes and discharge summary the present on admission status of pneumonia :    The medical record reflects the following:  Risk Factors: 69year old with Hx of DM, smoker  Clinical Indicators:  CXR - no focal consolidation, pleural effusion or pneumothorax, fever 102.7   CT chest - left lower lobe consolidation  Treatment: IV ceftriaxone and doxycycline    Thank you,  Caesar Thayer RN, CRCR  [email or web address]  You can also reach me via [employer]. Options provided:  -- Yes, pneumonia was present at the time of the order to admit to the hospital  -- No, pneumonia was not present on admission and developed during the inpatient stay  -- Other - I will add my own diagnosis  -- Disagree - Not applicable / Not valid  -- Disagree - Clinically unable to determine / Unknown  -- Refer to Clinical Documentation Reviewer    PROVIDER RESPONSE TEXT:    Yes, pneumonia was present at the time of the order to admit to the hospital.    Query created by: Kyle Farris on 2022 10:51 AM      QUERY TEXT:    Dr. Ananth Tracy,  Pt admitted with DKA. Pt noted to have sepsis. If possible, please document in progress notes and discharge summary the present on admission status of sepsis:    The medical record reflects the following:  Risk Factors: 69year old male, diabetic, smoker  Clinical Indicators: ED - WBC: 18.6, creatinine 1.72, troponin 265 (no EKG changes and no CP), glucose >1000  Temp Pulse Resp BP SpO2  22 1658 98.1 ? F (36.7 ? C) (!) 112 18 (!) 178/79 100 %  CXR - The lungs are well inflated.  No focal  consolidation, pleural effusion, or pneumothorax. The cardiomediastinal  silhouette is unremarkable. The visualized osseous structures are unremarkable. Temp Pulse Resp BP SpO2  09/01/22 0119 (!) 102.7 ? F (39.3 ? C) 90 16 136/71 98 %  9/1 CT chest/abd -  Left lower lobe consolidation, concerning for pneumonia. Treatment: IV ceftriaxone & doxycycline  Thank you,  Renny Pal RN, CRCR  [email or web address]  You can also reach me via [employer]. Options provided:  -- Yes, sepsis was present at the time of the order to admit to the hospital  -- No, sepsis was not present on admission and developed during the inpatient stay  -- Other - I will add my own diagnosis  -- Disagree - Not applicable / Not valid  -- Disagree - Clinically unable to determine / Unknown  -- Refer to Clinical Documentation Reviewer    PROVIDER RESPONSE TEXT:    Yes, sepsis was present at the time of the order to admit to the hospital.    Query created by:  Evie Buitrago on 9/28/2022 10:52 AM      Electronically signed by:  Isaiah Restrepo MD 9/28/2022 1:55 PM

## 2022-10-01 PROBLEM — R79.89 ELEVATED TROPONIN: Status: RESOLVED | Noted: 2022-08-31 | Resolved: 2022-10-01

## 2022-10-01 PROBLEM — R77.8 ELEVATED TROPONIN: Status: RESOLVED | Noted: 2022-08-31 | Resolved: 2022-10-01

## 2022-10-18 ENCOUNTER — VIRTUAL VISIT (OUTPATIENT)
Dept: FAMILY MEDICINE CLINIC | Age: 67
End: 2022-10-18
Payer: MEDICARE

## 2022-10-18 DIAGNOSIS — Z00.00 MEDICARE ANNUAL WELLNESS VISIT, SUBSEQUENT: Primary | ICD-10-CM

## 2022-10-18 DIAGNOSIS — E11.65 UNCONTROLLED TYPE 2 DIABETES MELLITUS WITH HYPERGLYCEMIA (HCC): ICD-10-CM

## 2022-10-18 DIAGNOSIS — Z13.6 ENCOUNTER FOR SCREENING FOR CARDIOVASCULAR DISORDERS: ICD-10-CM

## 2022-10-18 PROCEDURE — 1101F PT FALLS ASSESS-DOCD LE1/YR: CPT | Performed by: NURSE PRACTITIONER

## 2022-10-18 PROCEDURE — G0439 PPPS, SUBSEQ VISIT: HCPCS | Performed by: NURSE PRACTITIONER

## 2022-10-18 PROCEDURE — 1123F ACP DISCUSS/DSCN MKR DOCD: CPT | Performed by: NURSE PRACTITIONER

## 2022-10-18 PROCEDURE — 3017F COLORECTAL CA SCREEN DOC REV: CPT | Performed by: NURSE PRACTITIONER

## 2022-10-18 PROCEDURE — G8432 DEP SCR NOT DOC, RNG: HCPCS | Performed by: NURSE PRACTITIONER

## 2022-10-18 PROCEDURE — 3046F HEMOGLOBIN A1C LEVEL >9.0%: CPT | Performed by: NURSE PRACTITIONER

## 2022-10-18 PROCEDURE — 2022F DILAT RTA XM EVC RTNOPTHY: CPT | Performed by: NURSE PRACTITIONER

## 2022-10-18 PROCEDURE — G8756 NO BP MEASURE DOC: HCPCS | Performed by: NURSE PRACTITIONER

## 2022-10-18 PROCEDURE — G8427 DOCREV CUR MEDS BY ELIG CLIN: HCPCS | Performed by: NURSE PRACTITIONER

## 2022-10-18 RX ORDER — INSULIN GLARGINE 300 U/ML
20 INJECTION, SOLUTION SUBCUTANEOUS 2 TIMES DAILY
Qty: 6 ADJUSTABLE DOSE PRE-FILLED PEN SYRINGE | Refills: 3 | Status: SHIPPED | OUTPATIENT
Start: 2022-10-18

## 2022-10-26 ENCOUNTER — HOSPITAL ENCOUNTER (OUTPATIENT)
Dept: CT IMAGING | Age: 67
Discharge: HOME | End: 2022-10-26
Attending: NURSE PRACTITIONER
Payer: MEDICARE

## 2022-10-26 VITALS — HEIGHT: 69 IN | BODY MASS INDEX: 20.57 KG/M2 | WEIGHT: 138.89 LBS

## 2022-10-26 DIAGNOSIS — Z87.891 PERSONAL HISTORY OF TOBACCO USE, PRESENTING HAZARDS TO HEALTH: ICD-10-CM

## 2022-10-26 PROCEDURE — 71271 CT THORAX LUNG CANCER SCR C-: CPT

## 2022-10-28 NOTE — PROGRESS NOTES
Darcy Yadav who was evaluated through a synchronous (real-time) audio-video encounter, and/or the patient's healthcare decision maker, is aware that it is a billable service, which includes applicable co-pays, with coverage as determined by the patient's insurance carrier. Darcy Yadav provided verbal consent to proceed and patient identification was verified. This visit was conducted pursuant to the emergency declaration under the [address] [place] [address] and the [employer] and [employer] Act. A caregiver was present when appropriate. Ability to conduct physical exam was limited. The patient was located at home in a state where the provider was licensed to provide care. Darcy Yadav (: 1955) is a 69 y.o. male, established patient, here for evaluation of the following chief complaint(s):   Diabetes    SUBJECTIVE/OBJECTIVE:  HPI    Patient is a 69 y.o. M Patient presenting for diabetes follow up, hypertension check, hyperlipidemia check. Patient has symptoms of none of the following; no polyuria or polydipsia, chest pain or shortness of breath, hypoglycemic episodes, weight gain/loss, peripheral extremity tingling/pain, vision changes, medication side effects. Patient reports good compliance with medications, good compliance with diabetic diet. Current treatments include diabetic diet. Patient reports blood glucose most frequently , 100-200 on home checks. Review of Systems   All other systems reviewed and are negative. No flowsheet data found.     Physical Exam    [INSTRUCTIONS:  \"[x]\" Indicates a positive item  \"[]\" Indicates a negative item  -- DELETE ALL ITEMS NOT EXAMINED]    Constitutional: [x] Appears well-developed and well-nourished [x] No apparent distress      [] Abnormal -     Mental status: [x] Alert and awake  [x] Oriented to person/place/time [x] Able to follow commands    [] Abnormal -     Eyes:   EOM    [x]  Normal    [] Abnormal -   Sclera  [x]  Normal    [] Abnormal -          Discharge [x]  None visible   [] Abnormal -     HENT: [x] Normocephalic, atraumatic  [] Abnormal -   [x] Mouth/Throat: Mucous membranes are moist    External Ears [x] Normal  [] Abnormal -    Neck: [x] No visualized mass [] Abnormal -     Pulmonary/Chest: [x] Respiratory effort normal   [x] No visualized signs of difficulty breathing or respiratory distress        [] Abnormal -      Musculoskeletal:   [x] Normal gait with no signs of ataxia         [x] Normal range of motion of neck        [] Abnormal -     Neurological:        [x] No Facial Asymmetry (Cranial nerve 7 motor function) (limited exam due to video visit)          [x] No gaze palsy        [] Abnormal -          Skin:        [x] No significant exanthematous lesions or discoloration noted on facial skin         [] Abnormal -            Psychiatric:       [x] Normal Affect [] Abnormal -        [x] No Hallucinations    This is the Subsequent Medicare Annual Wellness Exam, performed 12 months or more after the Initial AWV or the last Subsequent AWV    I have reviewed the patient's medical history in detail and updated the computerized patient record. Assessment/Plan   Education and counseling provided:  Are appropriate based on today's review and evaluation    1. Medicare annual wellness visit, subsequent  2. Uncontrolled type 2 diabetes mellitus with hyperglycemia (HCC)  -     insulin glargine U-300 conc (Toujeo SoloStar U-300 Insulin) 300 unit/mL (1.5 mL) inpn pen; 20 Units by SubCUTAneous route two (2) times a day. Inject 20 units every morning, Normal, Disp-6 Adjustable Dose Pre-filled Pen Syringe, R-3Dose decrease  -     US EXAM SCREENING AAA; Future  3. Encounter for screening for cardiovascular disorders   -     US EXAM SCREENING AAA; Future   Diabetes mellitus poorly controlled, changes needed as noted above.     Issues discussed include maintaining a diabetic diet, weight control and exercise, continued home glucose monitoring, maintaining medication compliance, medication side effects, long term diabetes complications, foot care and podiatry visits, annual eye exam recommendations, and refraining from tobacco use. Treatment plan: Continue current medications, diet modification, medication changes, Labwork, A1C, Lipids check. patient up to date on maintence and screening exams, except for AAA ultrasound which was ordered today. Depression Risk Factor Screening:     3 most recent PHQ Screens 3/22/2022   Little interest or pleasure in doing things Not at all   Feeling down, depressed, irritable, or hopeless Not at all   Total Score PHQ 2 0   Trouble falling or staying asleep, or sleeping too much -   Feeling tired or having little energy -   Poor appetite, weight loss, or overeating -   Feeling bad about yourself - or that you are a failure or have let yourself or your family down -   Trouble concentrating on things such as school, work, reading, or watching TV -   Moving or speaking so slowly that other people could have noticed; or the opposite being so fidgety that others notice -   Thoughts of being better off dead, or hurting yourself in some way -   PHQ 9 Score -   How difficult have these problems made it for you to do your work, take care of your home and get along with others -       Alcohol & Drug Abuse Risk Screen    Do you average more than 1 drink per night or more than 7 drinks a week: No    In the past three months have you have had more than 4 drinks containing alcohol on one occasion: No          Functional Ability and Level of Safety:    Hearing: Hearing is good. Activities of Daily Living: The home contains: no safety equipment. Patient does total self care      Ambulation: with no difficulty     Fall Risk:  Fall Risk Assessment, last 12 mths 3/22/2022   Able to walk?  Yes   Fall in past 12 months? 0   Do you feel unsteady?  0   Are you worried about falling 0      Abuse Screen:  Patient is not abused       Cognitive Screening    Has your family/caregiver stated any concerns about your memory: no    Cognitive Screening: Normal - MMSE (Mini Mental Status Exam), Clock Drawing Test    Health Maintenance Due     Health Maintenance Due   Topic Date Due    Hepatitis C Screening  Never done    Foot Exam Q1  Never done    AAA Screening 73-69 YO Male Smoking Patients  Never done    COVID-19 Vaccine (2 - Booster for [employer] series) 05/13/2021    Medicare Yearly Exam  Never done    Eye Exam Retinal or Dilated  09/09/2022       Patient Care Team   Patient Care Team:  Claudy Malave NP as PCP - General (Nurse Practitioner)  Claudy Malave NP as PCP - [facility] Empaneled Provider  Meek Giron DO (Family Medicine)  Meek Giron DO    History     Patient Active Problem List   Diagnosis Code    Prostate cancer [facility]) C61    Hypertension I10    Hyperlipidemia E78.5    Erectile dysfunction N52.9    Uncontrolled type 2 diabetes mellitus with hyperglycemia ([facility] E11.65    Benign essential HTN I10    Mixed hyperlipidemia E78.2    Nocturia R35.1    Tobacco dependence F17.200    Glycosuria R81    DKA (diabetic ketoacidosis) ([facility] E11.10    Elevated troponin R77.8     Past Medical History:   Diagnosis Date    Cancer ([facility]     prostate    Colloid cyst of brain ([facility] 06/2011    S/P excision cyst at [facility] [address] [place] [address]    Constipation     Diabetes [facility])     IDDM    Erectile dysfunction     GERD (gastroesophageal reflux disease)     Hyperlipidemia     Hypertension     Migraines       Past Surgical History:   Procedure Laterality Date    HX GI  2012    HX HEENT  2006    exc mass right neck    HX OTHER SURGICAL  2012    brain    HX UROLOGICAL      prostate bx    NEUROLOGICAL PROCEDURE UNLISTED  6-2011    removal cranial colloid cyst at [facility]     Current Outpatient Medications   Medication Sig Dispense Refill    insulin glargine U-300 conc (Toujeo SoloStar U-300 Insulin) 300 unit/mL (1.5 mL) inpn pen 20 Units by SubCUTAneous route two (2) times a day. Inject 20 units every morning 6 Adjustable Dose Pre-filled Pen Syringe 3    flash glucose sensor (FreeStyle Joselin 2 Sensor) kit 1 Each by Does Not Apply route every fourteen (14) days. Use for daily glucose testing. Dx: E11.9 6 Kit 3    Janumet -1,000 mg TM24 TAKE 1 TABLET BY MOUTH DAILY 90 Tablet 1    atorvastatin (LIPITOR) 20 mg tablet Take 1 Tablet by mouth daily. 90 Tablet 1    ibuprofen (MOTRIN) 600 mg tablet TAKE 1 TABLET BY MOUTH EVERY 6 HOURS AS NEEDED FOR PAIN 90 Tablet 5    lisinopriL (PRINIVIL, ZESTRIL) 40 mg tablet Take 1 Tablet by mouth daily. 90 Tablet 1    Insulin Needles, Disposable, 31 gauge x 5/16\" ndle Once a day 100 Each 3    OneTouch Delica Plus Lancet 33 gauge misc USE TO TEST BLOOD GLUCOSE THREE TIMES DAILY      lancets misc Test 3 times daily. Dx code E11.65  Insurance brand of choice 300 Each 6    Blood-Glucose Meter monitoring kit Finger stick glucose TID DX E11.65  Insurance brand of choice. 1 Kit 0    glucose blood VI test strips (ASCENSIA AUTODISC VI, ONE TOUCH ULTRA TEST VI) strip Finger stick glucose TID DX E11.65  Insurance brand of choice. 300 Strip 3    aspirin 81 mg chewable tablet Take 81 mg by mouth daily. glucose blood VI test strips (ONETOUCH VERIO) strip Test 3 times daily.  Dx code 250.00 100 Strip 6     No Known Allergies    Family History   Problem Relation Age of Onset    Diabetes Mother     Heart Disease Mother     Stroke Mother     Heart Disease Father     Stroke Father     Hypertension Brother     Elevated Lipids Brother      Social History     Tobacco Use    Smoking status: Every Day     Packs/day: 0.50     Years: 53.00     Pack years: 26.50     Types: Cigarettes    Smokeless tobacco: Never    Tobacco comments:     Started smoking at age 11   Substance Use Topics    Alcohol use: Not Currently       Dianne Powers, was evaluated through a synchronous (real-time) audio-video encounter. The patient (or guardian if applicable) is aware that this is a billable service, which includes applicable co-pays. This Virtual Visit was conducted with patient's (and/or legal guardian's) consent. The visit was conducted pursuant to the emergency declaration under the [facility], [address] [place] [address] authority and the [employer] and [employer] General Act. Patient identification was verified, and a caregiver was present when appropriate. The patient was located at: Home: [address] [place] [address]  The provider was located at: Facility (Appt Department): [address] [place] [address]     Aspects of this note may have been generated using voice recognition software. Despite editing, there may be some syntax errors. An electronic signature was used to authenticate this note.     Wayne Ware NP

## 2022-11-01 DIAGNOSIS — J18.9 PNEUMONIA OF LOWER LOBE DUE TO INFECTIOUS ORGANISM, UNSPECIFIED LATERALITY: Primary | ICD-10-CM

## 2022-11-01 RX ORDER — LEVOFLOXACIN 750 MG/1
750 TABLET ORAL DAILY
Qty: 10 TABLET | Refills: 0 | Status: SHIPPED | OUTPATIENT
Start: 2022-11-01 | End: 2022-11-11

## 2022-11-01 RX ORDER — PREDNISONE 10 MG/1
TABLET ORAL
Qty: 21 TABLET | Refills: 0 | Status: SHIPPED | OUTPATIENT
Start: 2022-11-01 | End: 2022-11-15

## 2022-11-01 RX ORDER — GUAIFENESIN 600 MG/1
600 TABLET, EXTENDED RELEASE ORAL 2 TIMES DAILY
Qty: 20 TABLET | Refills: 0 | Status: SHIPPED | OUTPATIENT
Start: 2022-11-01

## 2022-11-01 NOTE — PROGRESS NOTES
CT of the lung concurrent with pneumonia for which I have sent antibiotics and prednisone along with Mucinex to better manage.

## 2022-11-02 ENCOUNTER — TELEPHONE (OUTPATIENT)
Dept: FAMILY MEDICINE CLINIC | Age: 67
End: 2022-11-02

## 2022-11-02 NOTE — TELEPHONE ENCOUNTER
I called the patient to advise him of his CT results, no answer. I left a vm for him to return my call.          ----- Message from Ivett Hunter NP sent at 11/1/2022  4:26 PM EDT -----  CT of the lung concurrent with pneumonia for which I have sent antibiotics and prednisone along with Mucinex to better manage.

## 2022-11-03 NOTE — TELEPHONE ENCOUNTER
I called the patient and advised him of his CT results and that medication has been sent to his pharmacy.

## 2022-11-09 ENCOUNTER — TELEPHONE (OUTPATIENT)
Dept: PHARMACY | Age: 67
End: 2022-11-09

## 2022-11-09 DIAGNOSIS — J18.9 PNEUMONIA OF LOWER LOBE DUE TO INFECTIOUS ORGANISM, UNSPECIFIED LATERALITY: ICD-10-CM

## 2022-11-09 NOTE — TELEPHONE ENCOUNTER
Pharmacy Progress Note - Telephone Call    Mr. Sofya Garcia 69 y.o. was contacted via an outbound telephone call regarding scheduling appointment for diabetes management today. A voicemail was left for patient to return my call. Thank you,  Izabela Jackson.  Ellis Daugherty, [address] [place] [address] Medication Management   [phone]

## 2022-11-12 DIAGNOSIS — J18.9 PNEUMONIA OF LOWER LOBE DUE TO INFECTIOUS ORGANISM, UNSPECIFIED LATERALITY: ICD-10-CM

## 2022-11-15 DIAGNOSIS — J18.9 PNEUMONIA OF LOWER LOBE DUE TO INFECTIOUS ORGANISM, UNSPECIFIED LATERALITY: ICD-10-CM

## 2022-11-15 RX ORDER — PREDNISONE 10 MG/1
TABLET ORAL
Qty: 21 TABLET | Refills: 1 | Status: SHIPPED | OUTPATIENT
Start: 2022-11-15

## 2022-11-16 NOTE — TELEPHONE ENCOUNTER
Pharmacy Progress Note - Telephone Call     Mr. Mascorro [place] 69 y.o. was contacted via an outbound telephone call regarding scheduling appointment for diabetes management today. Unable to leave voicemail due to mailbox being full; will attempt outreach again at a later time. Thank you,  Carmelo Vázquez.  Marquis Juares, [address] Medication Management   [phone]

## 2022-11-18 ENCOUNTER — TELEPHONE (OUTPATIENT)
Dept: PHARMACY | Age: 67
End: 2022-11-18

## 2022-11-18 NOTE — TELEPHONE ENCOUNTER
Reason for referral: Diabetes   Referring provider: hSanta Chao NP   Referring provider office: [address] [place] [address]   Status of Patient: New Patient   Length of Appt: 60 minutes   Type of Appt: likely virtual or phone - much further away - for virtual, please get email or cell phone to send link - can come into the office if they want to drive that far   Patient need address?  Yes - only if they want to come in person

## 2022-11-18 NOTE — TELEPHONE ENCOUNTER
Attempted to contact patient at home number but there was no answer and the TAD was full and would not accept any further messages. Attempted to contact patient at the mobile number listed but the announcement advised that this number was not in service. I will continue to attempt to contact this patient.     Damien Watters, [address] [employer] [facility]   Department, toll free: [phone] Option #3

## 2022-11-22 NOTE — TELEPHONE ENCOUNTER
Spoke to patient at the home number and advised him of the previous message. Patient verified understanding and scheduled a telephone appointment for 12/15/22 at 1 PM.    Patient would like to be called at the home number: [phone].

## 2022-11-22 NOTE — TELEPHONE ENCOUNTER
For [facility] in place: No  Recommendation Provided To: Patient/Caregiver: 1 via Telephone  Intervention Detail: Scheduled Appointment  Gap Closed?: Yes  Intervention Accepted By: Patient/Caregiver: 1  Time Spent (min): 10

## 2022-11-23 NOTE — TELEPHONE ENCOUNTER
Patient was scheduled with Jalen Christiansen PharmD on 12/15/22. Will sign off at this time. Thank you,  Can Gonzales.  Zane Ibrahim, [facility]  Clinical Pharmacist   [facility] Medication Management

## 2022-12-15 ENCOUNTER — VIRTUAL VISIT (OUTPATIENT)
Dept: INTERNAL MEDICINE CLINIC | Age: 67
End: 2022-12-15

## 2022-12-15 DIAGNOSIS — E11.65 UNCONTROLLED TYPE 2 DIABETES MELLITUS WITH HYPERGLYCEMIA (HCC): Primary | ICD-10-CM

## 2022-12-15 NOTE — PROGRESS NOTES
Phone follow-up for diabetes management    S/O: Placed an outgoing call to patient (2 identifiers used) to follow-up on diabetes. Patient states that he is taking his insulin twice daily and never misses it. He is out of Janumet as of today and pharmacy has to order it and says it will be in next week. Ngozi Rings had been prescribed previously but patient was never able to get it. Current Diabetes Regimen: Toujeo U300 - 20 units twice daily  Janumet /1000mg 1 tablet daily    ROS: Patient denies hypoglycemic and hyperglycemic signs/symptoms, chest pain, shortness of breath, neuropathy, vision changes, and any foot changes. Blood Sugars:  Not checking- states it is too hard for him to do    Labs:  Lab Results   Component Value Date/Time    Hemoglobin A1c 13.1 (H) 09/20/2022 12:16 PM    Hemoglobin A1c 12.4 (H) 09/09/2021 11:49 AM    Hemoglobin A1c 10.5 (H) 06/08/2021 03:59 PM    Hemoglobin A1c (POC) 11.9 02/09/2022 02:21 PM    Hemoglobin A1c (POC) 10.0 04/29/2015 10:47 AM    Hemoglobin A1c, External 11.0 01/17/2015 08:37 AM    Hemoglobin A1c, External 10.8 09/13/2014 09:37 AM    Glucose 226 (H) 09/20/2022 12:16 PM    Glucose (POC) 152 (H) 09/04/2022 07:35 AM    Microalb/Creat ratio (ug/mg creat.) 7 09/20/2022 12:16 PM    LDL, calculated 88 09/20/2022 12:16 PM    Creatinine (POC) 1.4 (H) 05/06/2014 04:32 PM    Creatinine 1.30 (H) 09/20/2022 12:16 PM         A/P:    Diabetes: a1c not at goal <7% and very elevated. Has been above 10% for years. Patient does not check blood sugars so it is really difficult to assess blood sugars and safely adjust. Will order kvng through [facility] today. Documents all submitted. Patient informed he would likely get a phonecall about this. Advised patient to call pharmacy back and ask for a few tablets of Janumet XR to get him through until the order comes in.    Advised patient Madhav would follow up with him in a week or 2      Patient verbalized understanding all information presented and advised to contact me with any additional questions or concerns. Follow up: next Wednesday    Notification of recommendations will be sent to Dr. Jake Ch for review.     Marii Joyce, PharmD, [facility], [address] [place] [address] in place: Yes  Recommendation Provided To: Patient/Caregiver: 2 via Telephone  Intervention Detail: New Rx: 1, reason: Needs Additional Therapy and Patient Access Assistance/Sample Provided  Intervention Accepted By: Patient/Caregiver: 2  Time Spent (min): 30

## 2022-12-15 NOTE — Clinical Note
Call next Wednesday just to check in - submitted Katelynn Mari to [place] today ([facility]) - you can pick what time you want to put him on your schedule - he wanted the call to [phone]

## 2022-12-21 ENCOUNTER — OFFICE VISIT (OUTPATIENT)
Dept: PHARMACY | Age: 67
End: 2022-12-21

## 2022-12-21 DIAGNOSIS — E11.65 UNCONTROLLED TYPE 2 DIABETES MELLITUS WITH HYPERGLYCEMIA (HCC): Primary | ICD-10-CM

## 2022-12-21 NOTE — PROGRESS NOTES
Phone follow-up for diabetes management    S/O: Placed an outgoing call to patient (2 identifiers used) to follow-up on diabetes. Patient states that he has not heard anything regarding his castaclip order and he still has not received his Janumet from the pharmacy. He does state that he continues to take his Toujeo twice daily without any concerns. Current Diabetes Regimen: Toujeo U300 - 20 units twice daily  Janumet /1000mg 1 tablet daily (has not been taking; currently out of medication and states pharmacy is working on it)    ROS: Patient denies hypoglycemic and hyperglycemic signs/symptoms, chest pain, shortness of breath, neuropathy, vision changes, and any foot changes. Blood Sugars:  - Not checking at home; states it is too hard for him to do    Labs:  Lab Results   Component Value Date/Time    Hemoglobin A1c 13.1 (H) 09/20/2022 12:16 PM    Hemoglobin A1c 12.4 (H) 09/09/2021 11:49 AM    Hemoglobin A1c 10.5 (H) 06/08/2021 03:59 PM    Hemoglobin A1c (POC) 11.9 02/09/2022 02:21 PM    Hemoglobin A1c (POC) 10.0 04/29/2015 10:47 AM    Hemoglobin A1c, External 11.0 01/17/2015 08:37 AM    Hemoglobin A1c, External 10.8 09/13/2014 09:37 AM    Glucose 226 (H) 09/20/2022 12:16 PM    Glucose (POC) 152 (H) 09/04/2022 07:35 AM    Microalb/Creat ratio (ug/mg creat.) 7 09/20/2022 12:16 PM    LDL, calculated 88 09/20/2022 12:16 PM    Creatinine (POC) 1.4 (H) 05/06/2014 04:32 PM    Creatinine 1.30 (H) 09/20/2022 12:16 PM       A/P:    Diabetes: a1c not at goal <7% and very elevated. Patient still waiting to hear from [facility] regarding his [employer] order. Informed him to continue to be on the lookout for a call (may be from an unknown number) and to make sure he is checking his voicemail. He expressed understanding. Will also follow up with Thersia Passer, PharmD regarding the status once she returns to the office. Placed call to patient's pharmacy to check on the status of his Janumet.  They stated that the medication is ready for pickup. Instructed patient to pick this up at his earliest convenience and start medication to assist with his blood sugar control. He expressed understanding. Overall, will plan to get patient set up with [facility] device first so that we can safely monitor blood sugars prior to making medication adjustments. Will also assist patient with lifestyle modifications and overall medication management as appropriate. Patient verbalized understanding all information presented and advised to contact me with any additional questions or concerns. Follow up: 2 weeks     Thank you,  Sukhi Moss [facility]  Clinical Pharmacist           For Pharmacy Admin Tracking Only    Program: Medical Group  CPA in place: Yes  Recommendation Provided To: Patient/Caregiver: 1 via Virtual Visit and Pharmacy: 1  Intervention Detail: Patient Access Assistance/Sample Provided  Gap Closed?: No  Intervention Accepted By: Patient/Caregiver: 1 and Pharmacy: 1  Time Spent (min): 30

## 2023-01-04 ENCOUNTER — OFFICE VISIT (OUTPATIENT)
Dept: PHARMACY | Age: 68
End: 2023-01-04

## 2023-01-04 DIAGNOSIS — E11.65 UNCONTROLLED TYPE 2 DIABETES MELLITUS WITH HYPERGLYCEMIA (HCC): Primary | ICD-10-CM

## 2023-01-04 NOTE — PROGRESS NOTES
Phone follow-up for diabetes management    S/O: Placed an outgoing call to patient (2 identifiers used) to follow-up on diabetes. Patient states that he has [facility]; however, he was obtained and restarted taking his Janumet. He continues to take his Toujeo twice daily without any concerns. Current Diabetes Regimen: Toujeo U300 - 20 units twice daily  Janumet /1000mg 1 tablet daily    ROS: Patient denies hypoglycemic and hyperglycemic signs/symptoms, chest pain, shortness of breath, neuropathy, vision changes, and any foot changes. Blood Sugars:  - Not checking at home; states it is too hard for him to do    Labs:  Lab Results   Component Value Date/Time    Hemoglobin A1c 13.1 (H) 09/20/2022 12:16 PM    Hemoglobin A1c 12.4 (H) 09/09/2021 11:49 AM    Hemoglobin A1c 10.5 (H) 06/08/2021 03:59 PM    Hemoglobin A1c (POC) 11.9 02/09/2022 02:21 PM    Hemoglobin A1c (POC) 10.0 04/29/2015 10:47 AM    Hemoglobin A1c, External 11.0 01/17/2015 08:37 AM    Hemoglobin A1c, External 10.8 09/13/2014 09:37 AM    Glucose 226 (H) 09/20/2022 12:16 PM    Glucose (POC) 152 (H) 09/04/2022 07:35 AM    Microalb/Creat ratio (ug/mg creat.) 7 09/20/2022 12:16 PM    LDL, calculated 88 09/20/2022 12:16 PM    Creatinine (POC) 1.4 (H) 05/06/2014 04:32 PM    Creatinine 1.30 (H) 09/20/2022 12:16 PM       A/P:    Diabetes: a1c not at goal <7% and very elevated. Patient still waiting to hear from [employer] regarding his [employer] order. Followed up on this and resubmitted order to a different DME supplier to see if it will be covered by his insurance. Will await response and follow up with patient as appropriate. Overall, will plan to get patient set up with [employer] device first so that we can safely monitor blood sugars prior to making medication adjustments. Will also assist patient with lifestyle modifications and overall medication management as appropriate. Patient verbalized understanding all information presented and advised to contact me with any additional questions or concerns. Follow up: 1-2 weeks     Thank you,  [facility]ienne [facility]  Clinical Pharmacist         For Pharmacy Admin Tracking Only    Program: Medical Group  CPA in place: Yes  Recommendation Provided To:  Other: 1  Intervention Detail: Patient Access Assistance/Sample Provided  Intervention Accepted By: Other: 1  Time Spent (min): 60

## 2023-01-06 ENCOUNTER — HOSPITAL ENCOUNTER (EMERGENCY)
Age: 68
Discharge: HOME | End: 2023-01-06
Attending: EMERGENCY MEDICINE
Payer: MEDICARE

## 2023-01-06 ENCOUNTER — TELEPHONE (OUTPATIENT)
Dept: FAMILY MEDICINE CLINIC | Age: 68
End: 2023-01-06

## 2023-01-06 ENCOUNTER — OFFICE VISIT (OUTPATIENT)
Dept: FAMILY MEDICINE CLINIC | Age: 68
End: 2023-01-06
Payer: MEDICARE

## 2023-01-06 VITALS
OXYGEN SATURATION: 98 % | SYSTOLIC BLOOD PRESSURE: 202 MMHG | BODY MASS INDEX: 26.01 KG/M2 | DIASTOLIC BLOOD PRESSURE: 111 MMHG | WEIGHT: 175.6 LBS | HEIGHT: 69 IN | HEART RATE: 92 BPM | TEMPERATURE: 97.8 F

## 2023-01-06 VITALS
OXYGEN SATURATION: 97 % | HEIGHT: 69 IN | WEIGHT: 175 LBS | SYSTOLIC BLOOD PRESSURE: 178 MMHG | DIASTOLIC BLOOD PRESSURE: 88 MMHG | BODY MASS INDEX: 25.92 KG/M2 | RESPIRATION RATE: 18 BRPM | HEART RATE: 80 BPM | TEMPERATURE: 98.4 F

## 2023-01-06 DIAGNOSIS — N40.1 BENIGN PROSTATIC HYPERPLASIA WITH URINARY FREQUENCY: ICD-10-CM

## 2023-01-06 DIAGNOSIS — C61 PROSTATE CANCER (HCC): ICD-10-CM

## 2023-01-06 DIAGNOSIS — Z12.11 ENCOUNTER FOR SCREENING FOR MALIGNANT NEOPLASM OF COLON: ICD-10-CM

## 2023-01-06 DIAGNOSIS — K59.00 CONSTIPATION, UNSPECIFIED CONSTIPATION TYPE: ICD-10-CM

## 2023-01-06 DIAGNOSIS — E11.65 UNCONTROLLED TYPE 2 DIABETES MELLITUS WITH HYPERGLYCEMIA (HCC): ICD-10-CM

## 2023-01-06 DIAGNOSIS — E78.2 MIXED HYPERLIPIDEMIA: ICD-10-CM

## 2023-01-06 DIAGNOSIS — R35.0 BENIGN PROSTATIC HYPERPLASIA WITH URINARY FREQUENCY: ICD-10-CM

## 2023-01-06 DIAGNOSIS — K12.2 ORAL ABSCESS: ICD-10-CM

## 2023-01-06 DIAGNOSIS — R35.1 NOCTURIA: ICD-10-CM

## 2023-01-06 DIAGNOSIS — I10 ELEVATED BLOOD PRESSURE READING WITH DIAGNOSIS OF HYPERTENSION: Primary | ICD-10-CM

## 2023-01-06 DIAGNOSIS — I16.9 HYPERTENSIVE CRISIS: Primary | ICD-10-CM

## 2023-01-06 LAB
P-R INTERVAL, ECG05: 119 MS
P-R INTERVAL, ECG05: NORMAL
QRS DURATION, ECG06: 143 MS
QTC INTERVAL: NORMAL MS
VENT RATE: 80 BPM

## 2023-01-06 PROCEDURE — 99215 OFFICE O/P EST HI 40 MIN: CPT | Performed by: NURSE PRACTITIONER

## 2023-01-06 PROCEDURE — 3017F COLORECTAL CA SCREEN DOC REV: CPT | Performed by: NURSE PRACTITIONER

## 2023-01-06 PROCEDURE — G8427 DOCREV CUR MEDS BY ELIG CLIN: HCPCS | Performed by: NURSE PRACTITIONER

## 2023-01-06 PROCEDURE — 99282 EMERGENCY DEPT VISIT SF MDM: CPT

## 2023-01-06 PROCEDURE — 1101F PT FALLS ASSESS-DOCD LE1/YR: CPT | Performed by: NURSE PRACTITIONER

## 2023-01-06 PROCEDURE — 1123F ACP DISCUSS/DSCN MKR DOCD: CPT | Performed by: NURSE PRACTITIONER

## 2023-01-06 PROCEDURE — G8536 NO DOC ELDER MAL SCRN: HCPCS | Performed by: NURSE PRACTITIONER

## 2023-01-06 PROCEDURE — G8432 DEP SCR NOT DOC, RNG: HCPCS | Performed by: NURSE PRACTITIONER

## 2023-01-06 PROCEDURE — 2022F DILAT RTA XM EVC RTNOPTHY: CPT | Performed by: NURSE PRACTITIONER

## 2023-01-06 PROCEDURE — 93000 ELECTROCARDIOGRAM COMPLETE: CPT | Performed by: NURSE PRACTITIONER

## 2023-01-06 PROCEDURE — 3074F SYST BP LT 130 MM HG: CPT | Performed by: NURSE PRACTITIONER

## 2023-01-06 PROCEDURE — 3078F DIAST BP <80 MM HG: CPT | Performed by: NURSE PRACTITIONER

## 2023-01-06 PROCEDURE — G8417 CALC BMI ABV UP PARAM F/U: HCPCS | Performed by: NURSE PRACTITIONER

## 2023-01-06 PROCEDURE — 3046F HEMOGLOBIN A1C LEVEL >9.0%: CPT | Performed by: NURSE PRACTITIONER

## 2023-01-06 RX ORDER — CHLORHEXIDINE GLUCONATE 1.2 MG/ML
15 RINSE ORAL EVERY 12 HOURS
Qty: 420 ML | Refills: 0 | Status: SHIPPED | OUTPATIENT
Start: 2023-01-06 | End: 2023-01-20

## 2023-01-06 RX ORDER — AMLODIPINE BESYLATE 5 MG/1
5 TABLET ORAL DAILY
Qty: 90 TABLET | Refills: 3 | Status: SHIPPED | OUTPATIENT
Start: 2023-01-06

## 2023-01-06 RX ORDER — AMOXICILLIN 500 MG/1
CAPSULE ORAL
Qty: 30 CAPSULE | Refills: 0 | Status: SHIPPED | OUTPATIENT
Start: 2023-01-06

## 2023-01-06 RX ORDER — TAMSULOSIN HYDROCHLORIDE 0.4 MG/1
0.4 CAPSULE ORAL DAILY
Qty: 90 CAPSULE | Refills: 3 | Status: SHIPPED | OUTPATIENT
Start: 2023-01-06

## 2023-01-06 RX ORDER — AMOXICILLIN 500 MG/1
CAPSULE ORAL
COMMUNITY
Start: 2022-11-21 | End: 2023-01-06 | Stop reason: ALTCHOICE

## 2023-01-06 RX ORDER — METOPROLOL SUCCINATE 25 MG/1
25 TABLET, EXTENDED RELEASE ORAL DAILY
Qty: 90 TABLET | Refills: 3 | Status: SHIPPED | OUTPATIENT
Start: 2023-01-06

## 2023-01-06 NOTE — PROGRESS NOTES
1. Have you been to the ER, urgent care clinic since your last visit? Hospitalized since your last visit? No    2. Have you seen or consulted any other health care providers outside of the [address] [place] [address] since your last visit? Include any pap smears or colon screening.  No    Chief Complaint   Patient presents with    Follow-up    Diabetes     Visit Vitals  BP (!) 202/105 (BP 1 Location: Left upper arm, BP Patient Position: Sitting, BP Cuff Size: Large adult)   Pulse 92   Temp 97.8 °F (36.6 °C) (Temporal)   Ht 5' 9\" (1.753 m)   Wt 175 lb 9.6 oz (79.7 kg)   SpO2 98%   BMI 25.93 kg/m²     Visit Vitals  BP (!) 202/111 (BP 1 Location: Right upper arm, BP Patient Position: Sitting, BP Cuff Size: Large adult)   Pulse 92   Temp 97.8 °F (36.6 °C) (Temporal)   Ht 5' 9\" (1.753 m)   Wt 175 lb 9.6 oz (79.7 kg)   SpO2 98%   BMI 25.93 kg/m²

## 2023-01-06 NOTE — PATIENT INSTRUCTIONS
Due to blood pressure being elevated go to ER right now for evaluation    Will start Amlodipine for blood pressure management in the morning    Will start Metoprolol for blood pressure management at bedtime    Continue with Lisinopril or blood pressure management in the morning    Get blood drawn in the next couple days to check cholesterol, diabetes, kidneys, liver    Start Linzess in the morning for constipation    Make appointment with Dr Ct Knowles for evaluation of constipation and colonoscopy    Start Flomax in the morning for urinary frequency

## 2023-01-06 NOTE — PROGRESS NOTES
Mignon Kelly (: 1955) is a 69 y.o. male, established patient, here for evaluation of the following chief complaint(s):  Follow-up and Diabetes    ASSESSMENT/PLAN:  Below is the assessment and plan developed based on review of pertinent history, physical exam, labs, studies, and medications. Based on today's findings we will proceed as follows:     1. Hypertensive crisis  -     amLODIPine (NORVASC) 5 mg tablet; Take 1 Tablet by mouth daily. Indications: high blood pressure, Normal, Disp-90 Tablet, R-3  -     metoprolol succinate (TOPROL-XL) 25 mg XL tablet; Take 1 Tablet by mouth daily. , Normal, Disp-90 Tablet, R-3  -     CBC WITH AUTOMATED DIFF  -     METABOLIC PANEL, COMPREHENSIVE  -     LIPID PANEL  -     THYROID CASCADE PROFILE  -     MICROALBUMIN, UR, RAND W/ MICROALB/CREAT RATIO  2. Uncontrolled type 2 diabetes mellitus with hyperglycemia (HCC)  -     CBC WITH AUTOMATED DIFF  -     METABOLIC PANEL, COMPREHENSIVE  -     LIPID PANEL  -     THYROID CASCADE PROFILE  -     MICROALBUMIN, UR, RAND W/ MICROALB/CREAT RATIO  -     HEMOGLOBIN A1C WITH EAG  3. Prostate cancer ([facility] [address]  4. Mixed hyperlipidemia  -     CBC WITH AUTOMATED DIFF  -     METABOLIC PANEL, COMPREHENSIVE  -     LIPID PANEL  -     THYROID CASCADE PROFILE  5. Nocturia  6. Oral abscess  -     amoxicillin (AMOXIL) 500 mg capsule; TAKE 1 CAPSULE BY MOUTH THREE TIMES DAILY FOR 10 DAYS, Normal, Disp-30 Capsule, R-0  -     chlorhexidine (Paroex Oral Rinse) 0.12 % solution; 15 mL by Swish and Spit route every twelve (12) hours for 14 days. , Normal, Disp-420 mL, R-0  7. Benign prostatic hyperplasia with urinary frequency  -     tamsulosin (Flomax) 0.4 mg capsule; Take 1 Capsule by mouth daily. Indications: enlarged prostate with urination problem, Normal, Disp-90 Capsule, R-3  8. Constipation, unspecified constipation type  -     linaCLOtide (Linzess) 72 mcg cap capsule; Take 1 Capsule by mouth Daily (before breakfast).  Indications: chronic idiopathic constipation, Normal, Disp-90 Capsule, R-3  -     REFERRAL TO GASTROENTEROLOGY  9. Encounter for screening for malignant neoplasm of colon  -     REFERRAL TO GASTROENTEROLOGY  EKG: normal EKG, normal sinus rhythm, unchanged from previous tracings. Patient Asymptomatic  Patient advised to go via EMS to hospital for evaluation. Patient declined. Patient educated on the severity of today's findings and urged to go to ED and patient agreed to go on his POV. ABX rx for oral abscess and follow up with dentist as scheduled. Educated on medication, uses, side effects, as well as signs and symptoms that may merit immediate medical attention. Patient is not to share medication and use only as indicated. Patient verbalized understanding and agreement. Treatment plan: Continue current medications, diet modification, maintaining low sodium diet,     Will start Amlodipine for blood pressure management in the morning    Will start Metoprolol for blood pressure management at bedtime    Continue with Lisinopril or blood pressure management in the morning    Get blood drawn in the next couple days to check cholesterol, diabetes, kidneys, liver, etc    Start Linzess in the morning for constipation    Make appointment with Dr Anderson Smith for evaluation of constipation and colonoscopy    Start Flomax in the morning for urinary frequency. Return in about 4 weeks (around 2/3/2023) for 30 minute appt , HTN, Hyperlipidemia, DM post ED, amlodipine, metoprolol, linzess, flomax. SUBJECTIVE/OBJECTIVE:  HPI    Patient is a 69 y.o. M Patient presenting for hypertension followup, diabetes follow up, hyperlipidemia check. has an oral abscess for which was waiting for dentist to send abx but not at the pharmacy yet. Has upcoming appt with dentist in 3 days. Patient reports blood pressures at home most frequently not checked.  Patient has symptoms of none of the following; no chest pain, shortness of breath, weakness, orthostatic hypotension, cough, myalgias, rash, headaches, weight gain, leg swelling, palpitations, slow heart rate, fatigue, depression. Patient reports good compliance with medications, no side effects from medications noted. Current treatments include diet modification. Review of Systems  All other systems reviewed and are negative. Visit Vitals  BP (!) 202/111 (BP 1 Location: Right upper arm, BP Patient Position: Sitting, BP Cuff Size: Large adult)   Pulse 92   Temp 97.8 °F (36.6 °C) (Temporal)   Ht 5' 9\" (1.753 m)   Wt 175 lb 9.6 oz (79.7 kg)   SpO2 98%   BMI 25.93 kg/m²       Physical Exam  Constitutional:  No acute distress  HEENT:  Head normocephalic and atraumatic. CV:  Regular rate and rhythm. No murmur. Respiratory:  Lungs clear to auscultation bilaterally  Abdomen:  Soft, non-tender. Skin:  Normal color. Warm and Dry  Extremities:  Non-tender. No pedal edema. Back:  No tenderness  Neuro:  No gross motor deficits    On this date 01/06/2023 I have spent 46 minutes reviewing previous notes, test results and face to face with the patient discussing the diagnosis and importance of compliance with the treatment plan as well as documenting on the day of the visit. Aspects of this note may have been generated using voice recognition software. Despite editing, there may be some syntax errors. An electronic signature was used to authenticate this note.   -- Raya Locke NP

## 2023-01-06 NOTE — TELEPHONE ENCOUNTER
Dr. Duglas Little of [facility] ED stated pates BP had lowered to 170/80 something  and there was not a lot to do sent home

## 2023-01-10 NOTE — ED PROVIDER NOTES
EMERGENCY DEPARTMENT HISTORY AND PHYSICAL EXAM      Date: 1/6/2023  Patient Name: Zeinab Delgado    History of Presenting Illness     Chief Complaint   Patient presents with    Hypertension       History Provided By: Patient and Patient's Wife    HPI: Zeinab Delgado, 69 y.o. male with PMHx of DM, HTN, presents ambulatory to the Ed after being sent to ED for elevated BP in routine office follow-up during which time his BP was found to be in the 727'L systolic, Pt had tajen his BP meds about one hour before his appointment. He denies CP, SOB, HA, Abd pain, NV, or vision changes. He was prescribed an additional medication there but was not given anything in the office. There are no other complaints, changes, or physical findings at this time.     Past History     Past Medical History:  Past Medical History:   Diagnosis Date    Cancer [facility])     prostate    Colloid cyst of brain ([facility] [address] 06/2011    S/P excision cyst at [facility] [address] [place] [address]    Constipation     Diabetes [facility])     IDDM    Erectile dysfunction     GERD (gastroesophageal reflux disease)     Hyperlipidemia     Hypertension     Migraines        Past Surgical History:  Past Surgical History:   Procedure Laterality Date    HX GI  2012    HX HEENT  2006    exc mass right neck    HX OTHER SURGICAL  2012    brain    HX UROLOGICAL      prostate bx    [place] UNLISTED NEUROLOGICAL/NEUROMUSCULAR DX PX  6-2011    removal cranial colloid cyst at [facility]       Family History:  Family History   Problem Relation Age of Onset    Diabetes Mother     Heart Disease Mother     Stroke Mother     Heart Disease Father     Stroke Father     Hypertension Brother     Elevated Lipids Brother        Social History:  Social History     Tobacco Use    Smoking status: Every Day     Packs/day: 0.50     Years: 53.00     Pack years: 26.50     Types: Cigarettes    Smokeless tobacco: Never    Tobacco comments:     Started smoking at age 11   Vaping Use    Vaping Use: Never used Substance Use Topics    Alcohol use: Not Currently    Drug use: Never       Allergies:  No Known Allergies    PCP: Liset Donaldson NP    No current facility-administered medications on file prior to encounter. Current Outpatient Medications on File Prior to Encounter   Medication Sig Dispense Refill    amLODIPine (NORVASC) 5 mg tablet Take 1 Tablet by mouth daily. Indications: high blood pressure 90 Tablet 3    amoxicillin (AMOXIL) 500 mg capsule TAKE 1 CAPSULE BY MOUTH THREE TIMES DAILY FOR 10 DAYS 30 Capsule 0    chlorhexidine (Paroex Oral Rinse) 0.12 % solution 15 mL by Swish and Spit route every twelve (12) hours for 14 days. 420 mL 0    metoprolol succinate (TOPROL-XL) 25 mg XL tablet Take 1 Tablet by mouth daily. 90 Tablet 3    tamsulosin (Flomax) 0.4 mg capsule Take 1 Capsule by mouth daily. Indications: enlarged prostate with urination problem 90 Capsule 3    linaCLOtide (Linzess) 72 mcg cap capsule Take 1 Capsule by mouth Daily (before breakfast). Indications: chronic idiopathic constipation 90 Capsule 3    insulin glargine U-300 conc (Toujeo SoloStar U-300 Insulin) 300 unit/mL (1.5 mL) inpn pen 20 Units by SubCUTAneous route two (2) times a day. Inject 20 units every morning 6 Adjustable Dose Pre-filled Pen Syringe 3    Janumet -1,000 mg TM24 TAKE 1 TABLET BY MOUTH DAILY 90 Tablet 1    atorvastatin (LIPITOR) 20 mg tablet Take 1 Tablet by mouth daily. 90 Tablet 1    ibuprofen (MOTRIN) 600 mg tablet TAKE 1 TABLET BY MOUTH EVERY 6 HOURS AS NEEDED FOR PAIN 90 Tablet 5    lisinopriL (PRINIVIL, ZESTRIL) 40 mg tablet Take 1 Tablet by mouth daily. 90 Tablet 1    Insulin Needles, Disposable, 31 gauge x 5/16\" ndle Once a day 100 Each 3    OneTouch Delica Plus Lancet 33 gauge misc USE TO TEST BLOOD GLUCOSE THREE TIMES DAILY      lancets misc Test 3 times daily.  Dx code E11.65  Insurance brand of choice 300 Each 6    Blood-Glucose Meter monitoring kit Finger stick glucose TID DX E11.65  Insurance brand of choice. 1 Kit 0    glucose blood VI test strips (ASCENSIA AUTODISC VI, ONE TOUCH ULTRA TEST VI) strip Finger stick glucose TID DX E11.65  Insurance brand of choice. 300 Strip 3    aspirin 81 mg chewable tablet Take 81 mg by mouth daily. glucose blood VI test strips (ONETOUCH VERIO) strip Test 3 times daily. Dx code 250.00 100 Strip 6       Review of Systems   Review of Systems   Constitutional: Negative. HENT: Negative. Negative for ear pain and sinus pain. Eyes: Negative. Negative for visual disturbance. Respiratory: Negative. Negative for shortness of breath. Cardiovascular:  Negative for chest pain and leg swelling. Gastrointestinal: Negative. Genitourinary: Negative. Musculoskeletal: Negative. Neurological:  Negative for dizziness, speech difficulty, weakness, light-headedness, numbness and headaches. Psychiatric/Behavioral: Negative. All other systems reviewed and are negative. Physical Exam   Physical Exam  Vitals and nursing note reviewed. Constitutional:       General: He is not in acute distress. HENT:      Head: Atraumatic. Eyes:      Conjunctiva/sclera: Conjunctivae normal.      Pupils: Pupils are equal, round, and reactive to light. Cardiovascular:      Rate and Rhythm: Normal rate and regular rhythm. Heart sounds: Normal heart sounds. No murmur heard. Pulmonary:      Effort: Pulmonary effort is normal. No respiratory distress. Breath sounds: Normal breath sounds. No wheezing or rales. Chest:      Chest wall: No tenderness. Abdominal:      General: Bowel sounds are normal. There is no distension. Palpations: Abdomen is soft. There is no mass. Tenderness: There is no abdominal tenderness. There is no guarding or rebound. Musculoskeletal:         General: Normal range of motion. Cervical back: Normal range of motion. Skin:     General: Skin is warm. Findings: No erythema or rash.    Neurological:      General: No focal deficit present. Mental Status: He is alert and oriented to person, place, and time. Mental status is at baseline. Cranial Nerves: No cranial nerve deficit. Lab and Diagnostic Study Results   Labs -   No results found for this or any previous visit (from the past 12 hour(s)). Radiologic Studies -   @lastxrresult@  CT Results  (Last 48 hours)      None          CXR Results  (Last 48 hours)      None            Medical Decision Making and ED Course   Differential Diagnosis & Medical Decision Making Provider Note:   Pt with elevated BP, without complication, found at routein office visit sent for evaluation. Pt asymptomatic with improved BP without intervention. Likely not enough time had gone by since taking medication. Pt currently 170s. Will discuss with PCP for discharge and follow-up. - I am the first provider for this patient. I reviewed the vital signs, available nursing notes, past medical history, past surgical history, family history and social history. The patients presenting problems have been discussed, and they are in agreement with the care plan formulated and outlined with them. I have encouraged them to ask questions as they arise throughout their visit. Vital Signs-Reviewed the patient's vital signs. No data found. ED Course:        HYPERTENSION COUNSELING: Education was provided to the patient today regarding their hypertension. Patient is made aware of their elevated blood pressure and is instructed to follow up this week with their Primary Care for a recheck. Patient is counseled regarding consequences of chronic, uncontrolled hypertension including kidney disease, heart disease, stroke or even death. Patient states their understanding and agrees to follow up this week. Additionally, during their visit, I discussed sodium restriction, maintaining ideal body weight and regular exercise program as physiologic means to achieve blood pressure control.  The patient will strive towards this. Procedures   Performed by: Niya Hernandez MD  Procedures      Disposition   Disposition: Condition improved  DC- Adult Discharges: All of the diagnostic tests were reviewed and questions answered. Diagnosis, care plan and treatment options were discussed. The patient understands the instructions and will follow up as directed. The patients results have been reviewed with them. They have been counseled regarding their diagnosis. The patient and spouse/SO verbally convey understanding and agreement of the signs, symptoms, diagnosis, treatment and prognosis and additionally agrees to follow up as recommended with their PCP in 24 - 48 hours. They also agree with the care-plan and convey that all of their questions have been answered. I have also put together some discharge instructions for them that include: 1) educational information regarding their diagnosis, 2) how to care for their diagnosis at home, as well a 3) list of reasons why they would want to return to the ED prior to their follow-up appointment, should their condition change. DISCHARGE PLAN:  1. Cannot display discharge medications since this patient is not currently admitted. 2.   Follow-up Information       Follow up With Specialties Details Why Contact Hortencia Villarreal NP Nurse Practitioner  follow-up as scheduled [address]  [phone]          Be sure to obtain your blood work ordered by [facility]    [address] [place] [address] Emergency Medicine  As needed [address] [place] [id number] [phone]          3. Return to ED if worse   4. Discharge Medication List as of 1/6/2023 10:59 AM        CONSULT NOTE:   Dr Dez Umanzor spoke with Dr Enrrique Farris's nurse  Specialty: PCP  Discussed pt's hx, disposition. Reviewed care plans. Consultant agrees with discharge and will see pt in follow-up.         Diagnosis/Clinical Impression Clinical Impression:   1. Elevated blood pressure reading with diagnosis of hypertension        Attestations: Ketan Brown MD, am the primary clinician of record. Please note that this dictation was completed with [employer], the computer voice recognition software. Quite often unanticipated grammatical, syntax, homophones, and other interpretive errors are inadvertently transcribed by the computer software. Please disregard these errors. Please excuse any errors that have escaped final proofreading. Thank you.

## 2023-01-14 ENCOUNTER — HOSPITAL ENCOUNTER (EMERGENCY)
Age: 68
Discharge: HOME | End: 2023-01-14
Attending: EMERGENCY MEDICINE
Payer: MEDICARE

## 2023-01-14 ENCOUNTER — APPOINTMENT (OUTPATIENT)
Dept: GENERAL RADIOLOGY | Age: 68
End: 2023-01-14
Attending: EMERGENCY MEDICINE
Payer: MEDICARE

## 2023-01-14 VITALS
TEMPERATURE: 98.3 F | DIASTOLIC BLOOD PRESSURE: 87 MMHG | HEIGHT: 69 IN | HEART RATE: 78 BPM | SYSTOLIC BLOOD PRESSURE: 160 MMHG | WEIGHT: 150 LBS | OXYGEN SATURATION: 98 % | RESPIRATION RATE: 18 BRPM | BODY MASS INDEX: 22.22 KG/M2

## 2023-01-14 DIAGNOSIS — S20.211A CONTUSION OF RIB ON RIGHT SIDE, INITIAL ENCOUNTER: Primary | ICD-10-CM

## 2023-01-14 PROCEDURE — 99283 EMERGENCY DEPT VISIT LOW MDM: CPT

## 2023-01-14 PROCEDURE — 71101 X-RAY EXAM UNILAT RIBS/CHEST: CPT

## 2023-01-14 RX ORDER — DICLOFENAC SODIUM 30 MG/G
GEL TOPICAL 2 TIMES DAILY
Qty: 100 G | Refills: 0 | Status: SHIPPED | OUTPATIENT
Start: 2023-01-14

## 2023-01-14 RX ORDER — NAPROXEN 500 MG/1
500 TABLET ORAL 2 TIMES DAILY WITH MEALS
Qty: 20 TABLET | Refills: 0 | Status: SHIPPED | OUTPATIENT
Start: 2023-01-14 | End: 2023-01-24

## 2023-01-14 NOTE — Clinical Note
[address]  [address] [facility] [address]  [phone]    Work/School Note    Date: 1/14/2023    To Whom It May concern:    Aramis Goodman was seen and treated today in the emergency room by the following provider(s):  Attending Provider: Ines Myers is excused from work/school on 01/14/23 and 01/15/23. He is medically clear to return to work/school on 1/16/2023.        Sincerely,          Francy Nugent, DO

## 2023-01-14 NOTE — DISCHARGE INSTRUCTIONS
Thank you! Thank you for allowing me to care for you in the emergency department. It is my goal to provide you with excellent care. If you have not received excellent quality care, please ask to speak to the nurse manager. Please fill out the survey that will come to you by mail or email since we listen to your feedback! Below you will find a list of your tests from today's visit. Should you have any questions, please do not hesitate to call the emergency department. Labs  No results found for this or any previous visit (from the past 12 hour(s)). Radiologic Studies  XR RIBS RT W PA CXR MIN 3 V   Final Result   Right lower lobe atelectasis. No acute fracture. CT Results  (Last 48 hours)      None          CXR Results  (Last 48 hours)      None          ------------------------------------------------------------------------------------------------------------  The exam and treatment you received in the Emergency Department were for an urgent problem and are not intended as complete care. It is important that you follow-up with a doctor, nurse practitioner, or physician assistant to:  (1) confirm your diagnosis,  (2) re-evaluation of changes in your illness and treatment, and  (3) for ongoing care. Please take your discharge instructions with you when you go to your follow-up appointment. If you have any problem arranging a follow-up appointment, contact the Emergency Department. If your symptoms become worse or you do not improve as expected and you are unable to reach your health care provider, please return to the Emergency Department. We are available 24 hours a day. If a prescription has been provided, please have it filled as soon as possible to prevent a delay in treatment. If you have any questions or reservations about taking the medication due to side effects or interactions with other medications, please call your primary care provider or contact the ER.

## 2023-01-14 NOTE — ED NOTES
Pt demonstrated incentive spirometry correctly. Provided dc instructions - no questions or concerns. Verbalized understanding.

## 2023-01-14 NOTE — ED TRIAGE NOTES
[place] [employer] on Tues walking down outside steps; right rib/abdominal pain 10/10. No other injuries reported. +BT.  Reports shortness of breath

## 2023-01-14 NOTE — ED PROVIDER NOTES
[facility] EMERGENCY DEPARTMENT  EMERGENCY DEPARTMENT HISTORY AND PHYSICAL EXAM      Date: 1/14/2023  Patient Name: Josefa Antoine  MRN: [id number]  [place]: 1955  Date of evaluation: 1/14/2023  Provider: Marino Castillo DO   Note Started: 10:28 AM 1/14/23    HISTORY OF PRESENT ILLNESS     Chief Complaint   Patient presents with    Fall    Rib Injury       History Provided By: Patient    HPI: Josefa Antoine, 69 y.o. male presenting to the emergency department for evaluation of right lower rib pain x4 days. States that he slipped on ice and subsequently fell onto concrete steps. Patient did not hit his head or lose consciousness. Not on blood thinners. Has been taking over-the-counter medication without significant improvement of symptoms. Reports pain made worse with movement.     PAST MEDICAL HISTORY   Past Medical History:  Past Medical History:   Diagnosis Date    Cancer [facility])     prostate    Colloid cyst of brain ([facility] [address] 06/2011    S/P excision cyst at [address]    Constipation     Diabetes [facility])     IDDM    Erectile dysfunction     GERD (gastroesophageal reflux disease)     Hyperlipidemia     Hypertension     Migraines        Past Surgical History:  Past Surgical History:   Procedure Laterality Date    HX GI  2012    HX HEENT  2006    exc mass right neck    HX OTHER SURGICAL  2012    brain    HX UROLOGICAL      prostate bx    [place] UNLISTED NEUROLOGICAL/NEUROMUSCULAR DX PX  6-2011    removal cranial colloid cyst at [facility]       Family History:  Family History   Problem Relation Age of Onset    Diabetes Mother     Heart Disease Mother     Stroke Mother     Heart Disease Father     Stroke Father     Hypertension Brother     Elevated Lipids Brother        Social History:  Social History     Tobacco Use    Smoking status: Every Day     Packs/day: 0.50     Years: 53.00     Pack years: 26.50     Types: Cigarettes    Smokeless tobacco: Never    Tobacco comments:     Started smoking at age 11 Vaping Use    Vaping Use: Never used   Substance Use Topics    Alcohol use: Not Currently    Drug use: Never       Allergies:  No Known Allergies    PCP: Claudy Malave NP    Current Meds:   Discharge Medication List as of 1/14/2023 10:21 AM        CONTINUE these medications which have NOT CHANGED    Details   amLODIPine (NORVASC) 5 mg tablet Take 1 Tablet by mouth daily. Indications: high blood pressure, Normal, Disp-90 Tablet, R-3      amoxicillin (AMOXIL) 500 mg capsule TAKE 1 CAPSULE BY MOUTH THREE TIMES DAILY FOR 10 DAYS, Normal, Disp-30 Capsule, R-0      chlorhexidine (Paroex Oral Rinse) 0.12 % solution 15 mL by Swish and Spit route every twelve (12) hours for 14 days. , Normal, Disp-420 mL, R-0      metoprolol succinate (TOPROL-XL) 25 mg XL tablet Take 1 Tablet by mouth daily. , Normal, Disp-90 Tablet, R-3      tamsulosin (Flomax) 0.4 mg capsule Take 1 Capsule by mouth daily. Indications: enlarged prostate with urination problem, Normal, Disp-90 Capsule, R-3      linaCLOtide (Linzess) 72 mcg cap capsule Take 1 Capsule by mouth Daily (before breakfast). Indications: chronic idiopathic constipation, Normal, Disp-90 Capsule, R-3      insulin glargine U-300 conc (Toujeo SoloStar U-300 Insulin) 300 unit/mL (1.5 mL) inpn pen 20 Units by SubCUTAneous route two (2) times a day. Inject 20 units every morning, Normal, Disp-6 Adjustable Dose Pre-filled Pen Syringe, R-3Dose decrease      Janumet -1,000 mg TM24 TAKE 1 TABLET BY MOUTH DAILY, Normal, Disp-90 Tablet, R-1, SONIA      atorvastatin (LIPITOR) 20 mg tablet Take 1 Tablet by mouth daily. , Normal, Disp-90 Tablet, R-1      ibuprofen (MOTRIN) 600 mg tablet TAKE 1 TABLET BY MOUTH EVERY 6 HOURS AS NEEDED FOR PAIN, Normal, Disp-90 Tablet, R-5      lisinopriL (PRINIVIL, ZESTRIL) 40 mg tablet Take 1 Tablet by mouth daily. , Normal, Disp-90 Tablet, R-1      Insulin Needles, Disposable, 31 gauge x 5/16\" ndle Once a day, Normal, Disp-100 Each, R-3      !!  Tasha Alcantar Plus Lancet 33 gauge misc USE TO TEST BLOOD GLUCOSE THREE TIMES DAILY, Historical Med, SONIA      !! lancets misc Test 3 times daily. Dx code E11.65  Insurance brand of choice, Normal, Disp-300 Each, R-6      Blood-Glucose Meter monitoring kit Finger stick glucose TID DX E11.65  Insurance brand of choice., Normal, Disp-1 Kit, R-0      !! glucose blood VI test strips (ASCENSIA AUTODISC VI, ONE TOUCH ULTRA TEST VI) strip Finger stick glucose TID DX E11.65  Insurance brand of choice., Normal, Disp-300 Strip, R-3      aspirin 81 mg chewable tablet Take 81 mg by mouth daily. , Historical Med      !! glucose blood VI test strips (ONETOUCH VERIO) strip Test 3 times daily. Dx code 250.00, Normal, Disp-100 Strip,R-6       !! - Potential duplicate medications found. Please discuss with provider. REVIEW OF SYSTEMS   Review of Systems   Constitutional:  Negative for chills and fever. HENT:  Negative for congestion and rhinorrhea. Eyes:  Negative for photophobia and visual disturbance. Respiratory:  Negative for cough and shortness of breath. Cardiovascular:  Negative for chest pain and palpitations. Gastrointestinal:  Negative for abdominal pain, diarrhea, nausea and vomiting. Musculoskeletal:  Positive for myalgias. Negative for arthralgias. Skin:  Negative for color change and rash. Neurological:  Negative for weakness and headaches. PHYSICAL EXAM     ED Triage Vitals [01/14/23 0919]   ED Encounter Vitals Group      BP (!) 201/134      Pulse (Heart Rate) 95      Resp Rate 18      Temp 98.3 °F (36.8 °C)      Temp src       O2 Sat (%) 98 %      Weight 150 lb      Height 5' 9\"      Physical Exam  Constitutional:       General: He is not in acute distress. Appearance: Normal appearance. He is not ill-appearing. HENT:      Head: Normocephalic and atraumatic.       Right Ear: External ear normal.      Left Ear: External ear normal.      Nose: Nose normal.      Mouth/Throat:      Mouth: Mucous membranes are moist.   Eyes:      Extraocular Movements: Extraocular movements intact. Conjunctiva/sclera: Conjunctivae normal.      Pupils: Pupils are equal, round, and reactive to light. Cardiovascular:      Rate and Rhythm: Normal rate and regular rhythm. Pulses: Normal pulses. Pulmonary:      Effort: Pulmonary effort is normal. No respiratory distress. Breath sounds: Normal breath sounds. Abdominal:      General: Abdomen is flat. There is no distension. Musculoskeletal:         General: Tenderness present. Normal range of motion. Cervical back: Normal range of motion. Back:    Skin:     General: Skin is warm and dry. Neurological:      General: No focal deficit present. Mental Status: He is alert and oriented to person, place, and time. Psychiatric:         Mood and Affect: Mood normal.         Behavior: Behavior normal.         Thought Content: Thought content normal.         Judgment: Judgment normal.       SCREENINGS               No data recorded        LAB, EKG AND DIAGNOSTIC RESULTS   Labs:  No results found for this or any previous visit (from the past 12 hour(s)). Radiologic Studies:  Non-plain film images such as CT, Ultrasound and MRI are read by the radiologist. Axel Orr radiographic images are visualized and preliminarily interpreted by the ED Provider with the below findings:    No obvious rib deformity, pneumothorax    Interpretation per the Radiologist below, if available at the time of this note:  XR RIBS RT W PA CXR MIN 3 V    Result Date: 1/14/2023  CLINICAL HISTORY: Status post fall with right rib pain Comparison 8/31/2022 PA view of the chest and 3 oblique views of the right ribs demonstrate normal heart size. Right lower lobe atelectasis is noted. No fracture or pneumothorax. Right lower lobe atelectasis. No acute fracture. PROCEDURES   Unless otherwise noted below, none.   Performed by: Annika Collins DO   Procedures      CRITICAL CARE TIME       ED COURSE and DIFFERENTIAL DIAGNOSIS/MDM   Vitals:    Vitals:    01/14/23 0919 01/14/23 1019   BP: (!) 201/134 (!) 160/87   Pulse: 95 78   Resp: 18 18   Temp: 98.3 °F (36.8 °C)    SpO2: 98% 98%   Weight: 68 kg (150 lb)    Height: 5' 9\" (1.753 m)         Patient was given the following medications:  Medications - No data to display    CONSULTS: (Who and What was discussed)  None     Chronic Conditions: Diabetes, hypertension, hyperlipidemia  Social Determinants affecting Dx or Tx: None  Counseling:        CC/HPI Summary, DDx, ED Course, and Reassessment: 20-year-old with complaint of right flank pain after mechanical fall. Differential diagnosis includes contusion, fracture, pneumothorax. X-ray negative for fracture, however does demonstrate atelectasis. Will discharge home with incentive spirometry. FINAL IMPRESSION     1. Contusion of rib on right side, initial encounter          DISPOSITION/PLAN   Discharged    Discharge Note: The patient is stable for discharge home. The signs, symptoms, diagnosis, and discharge instructions have been discussed, understanding conveyed, and agreed upon. The patient is to follow up as recommended or return to ER should their symptoms worsen.       PATIENT REFERRED TO:  Follow-up Information       Follow up With Specialties Details Why Contact Info    Melinda Gross NP Nurse Practitioner Schedule an appointment as soon as possible for a visit   [address] [address]  [phone]      [address] [place] [address] Emergency Medicine  As needed, If symptoms worsen [address] [place] [id number] [phone]              DISCHARGE MEDICATIONS:  Discharge Medication List as of 1/14/2023 10:21 AM        START taking these medications    Details   diclofenac (SOLARAZE) 3 % topical gel Apply  to affected area two (2) times a day., Normal, Disp-100 g, R-0      naproxen (Naprosyn) 500 mg tablet Take 1 Tablet by mouth two (2) times daily (with meals) for 10 days. , Normal, Disp-20 Tablet, R-0           CONTINUE these medications which have NOT CHANGED    Details   amLODIPine (NORVASC) 5 mg tablet Take 1 Tablet by mouth daily. Indications: high blood pressure, Normal, Disp-90 Tablet, R-3      amoxicillin (AMOXIL) 500 mg capsule TAKE 1 CAPSULE BY MOUTH THREE TIMES DAILY FOR 10 DAYS, Normal, Disp-30 Capsule, R-0      chlorhexidine (Paroex Oral Rinse) 0.12 % solution 15 mL by Swish and Spit route every twelve (12) hours for 14 days. , Normal, Disp-420 mL, R-0      metoprolol succinate (TOPROL-XL) 25 mg XL tablet Take 1 Tablet by mouth daily. , Normal, Disp-90 Tablet, R-3      tamsulosin (Flomax) 0.4 mg capsule Take 1 Capsule by mouth daily. Indications: enlarged prostate with urination problem, Normal, Disp-90 Capsule, R-3      linaCLOtide (Linzess) 72 mcg cap capsule Take 1 Capsule by mouth Daily (before breakfast). Indications: chronic idiopathic constipation, Normal, Disp-90 Capsule, R-3      insulin glargine U-300 conc (Toujeo SoloStar U-300 Insulin) 300 unit/mL (1.5 mL) inpn pen 20 Units by SubCUTAneous route two (2) times a day. Inject 20 units every morning, Normal, Disp-6 Adjustable Dose Pre-filled Pen Syringe, R-3Dose decrease      Janumet -1,000 mg TM24 TAKE 1 TABLET BY MOUTH DAILY, Normal, Disp-90 Tablet, R-1, SONIA      atorvastatin (LIPITOR) 20 mg tablet Take 1 Tablet by mouth daily. , Normal, Disp-90 Tablet, R-1      ibuprofen (MOTRIN) 600 mg tablet TAKE 1 TABLET BY MOUTH EVERY 6 HOURS AS NEEDED FOR PAIN, Normal, Disp-90 Tablet, R-5      lisinopriL (PRINIVIL, ZESTRIL) 40 mg tablet Take 1 Tablet by mouth daily. , Normal, Disp-90 Tablet, R-1      Insulin Needles, Disposable, 31 gauge x 5/16\" ndle Once a day, Normal, Disp-100 Each, R-3      !! OneTouch Delica Plus Lancet 33 gauge misc USE TO TEST BLOOD GLUCOSE THREE TIMES DAILY, Historical Med, SONIA      !! lancets misc Test 3 times daily.  Dx code E11.65 Insurance brand of choice, Normal, Disp-300 Each, R-6      Blood-Glucose Meter monitoring kit Finger stick glucose TID DX E11.65  Insurance brand of choice., Normal, Disp-1 Kit, R-0      !! glucose blood VI test strips (ASCENSIA AUTODISC VI, ONE TOUCH ULTRA TEST VI) strip Finger stick glucose TID DX E11.65  Insurance brand of choice., Normal, Disp-300 Strip, R-3      aspirin 81 mg chewable tablet Take 81 mg by mouth daily. , Historical Med      !! glucose blood VI test strips (ONETOUCH VERIO) strip Test 3 times daily. Dx code 250.00, Normal, Disp-100 Strip,R-6       !! - Potential duplicate medications found. Please discuss with provider. DISCONTINUED MEDICATIONS:  Discharge Medication List as of 1/14/2023 10:21 AM          I am the Primary Clinician of Record: Ibrahima Yost DO (electronically signed)    (Please note that parts of this dictation were completed with voice recognition software. Quite often unanticipated grammatical, syntax, homophones, and other interpretive errors are inadvertently transcribed by the computer software. Please disregards these errors.  Please excuse any errors that have escaped final proofreading.)

## 2023-01-19 ENCOUNTER — TELEPHONE (OUTPATIENT)
Dept: PHARMACY | Age: 68
End: 2023-01-19

## 2023-01-20 NOTE — TELEPHONE ENCOUNTER
Pharmacy Progress Note       Followed up on the status of patient's CGM order. Still having issues finding a DME that will accept patient's insurance. Resent order to [facility] and will follow up on this again in 1 week. Thank you,  Travon Hicks. Christine Haas, [facility]  Clinical Pharmacist           For Pharmacy Admin Tracking Only    Program: Medical Group  CPA in place: Yes  Recommendation Provided To:  Other: 1  Intervention Detail: Patient Access Assistance/Sample Provided  Intervention Accepted By: Other: 1  Gap Closed?: No  Time Spent (min): 30

## 2023-01-27 DIAGNOSIS — I10 PRIMARY HYPERTENSION: ICD-10-CM

## 2023-01-27 RX ORDER — LISINOPRIL 40 MG/1
40 TABLET ORAL DAILY
Qty: 90 TABLET | Refills: 1 | Status: CANCELLED | OUTPATIENT
Start: 2023-01-27

## 2023-01-27 NOTE — TELEPHONE ENCOUNTER
Requested Prescriptions     Pending Prescriptions Disp Refills    lisinopriL (PRINIVIL, ZESTRIL) 40 mg tablet 90 Tablet 1     Sig: Take 1 Tablet by mouth daily.         Last OV:1/16/23  Next OV:2/14/23  Last Refill:3/22/22  Qty 90  Refills 1

## 2023-01-27 NOTE — TELEPHONE ENCOUNTER
----- Message from Meera Baer sent at 1/27/2023  2:47 PM EST -----  Subject: Refill Request    QUESTIONS  Name of Medication? lisinopriL (PRINIVIL, ZESTRIL) 40 mg tablet  Patient-reported dosage and instructions? once a day  How many days do you have left? 0  [facility]erred [facility]acy? [id number]  Pharmacy phone number (if available)? [phone]  Additional Information for Provider? Please refill prescription to   [facility]. ---------------------------------------------------------------------------  --------------  Markel Roman INFO  What is the best way for the office to contact you? OK to leave message on   voicemail  Preferred Call Back Phone Number? [phone]  ---------------------------------------------------------------------------  --------------  SCRIPT ANSWERS  Relationship to Patient? Other  Representative Name? Wicho Bardales  Is the Representative on the appropriate HIPAA document in Epic?  Yes

## 2023-01-27 NOTE — TELEPHONE ENCOUNTER
Requested Prescriptions     Pending Prescriptions Disp Refills    lisinopriL (PRINIVIL, ZESTRIL) 40 mg tablet 90 Tablet 1     Sig: Take 1 Tablet by mouth daily.           Last OV:1/6/23  Next OV:2/14/23  Last Refill:  Last (labs):    -*Insert any notes here*

## 2023-01-27 NOTE — TELEPHONE ENCOUNTER
----- Message from Shi Page sent at 1/27/2023  2:47 PM EST -----  Subject: Refill Request    QUESTIONS  Name of Medication? lisinopriL (PRINIVIL, ZESTRIL) 40 mg tablet  Patient-reported dosage and instructions? once a day  How many days do you have left? 0  Preferred [facility]acy? [id number]  Pharmacy phone number (if available)? [phone]  Additional Information for Provider? Please refill prescription to   [facility]. ---------------------------------------------------------------------------  --------------  Maegan Epstein INFO  What is the best way for the office to contact you? OK to leave message on   voicemail  Preferred Call Back Phone Number? [phone]  ---------------------------------------------------------------------------  --------------  SCRIPT ANSWERS  Relationship to Patient? Other  Representative Name? Melissa Collins  Is the Representative on the appropriate HIPAA document in Epic?  Yes

## 2023-01-30 RX ORDER — LISINOPRIL 40 MG/1
40 TABLET ORAL DAILY
Qty: 90 TABLET | Refills: 1 | Status: SHIPPED | OUTPATIENT
Start: 2023-01-30

## 2023-01-30 NOTE — TELEPHONE ENCOUNTER
Patients daughter called and stated the pharmacy is stating he is unable to get his lisinopril due to not having an appointment with Rhonda [facility]. Patient has a appointment scheduled for 2/14. Patient daughter would like a call back from clinical staff regarding the above. Please call [phone].

## 2023-02-06 ENCOUNTER — ANESTHESIA EVENT (OUTPATIENT)
Dept: ENDOSCOPY | Age: 68
End: 2023-02-06
Payer: MEDICARE

## 2023-02-06 ENCOUNTER — APPOINTMENT (OUTPATIENT)
Dept: ENDOSCOPY | Age: 68
End: 2023-02-06
Attending: INTERNAL MEDICINE
Payer: MEDICARE

## 2023-02-06 ENCOUNTER — ANESTHESIA (OUTPATIENT)
Dept: ENDOSCOPY | Age: 68
End: 2023-02-06
Payer: MEDICARE

## 2023-02-06 ENCOUNTER — HOSPITAL ENCOUNTER (OUTPATIENT)
Age: 68
Setting detail: OUTPATIENT SURGERY
Discharge: HOME | End: 2023-02-06
Attending: INTERNAL MEDICINE | Admitting: INTERNAL MEDICINE
Payer: MEDICARE

## 2023-02-06 ENCOUNTER — TELEPHONE (OUTPATIENT)
Dept: PHARMACY | Age: 68
End: 2023-02-06

## 2023-02-06 VITALS
TEMPERATURE: 97.7 F | HEART RATE: 60 BPM | BODY MASS INDEX: 22.15 KG/M2 | DIASTOLIC BLOOD PRESSURE: 80 MMHG | SYSTOLIC BLOOD PRESSURE: 148 MMHG | OXYGEN SATURATION: 99 % | RESPIRATION RATE: 16 BRPM | WEIGHT: 150 LBS

## 2023-02-06 LAB
GLUCOSE BLD STRIP.AUTO-MCNC: 204 MG/DL (ref 65–100)
PERFORMED BY, TECHID: ABNORMAL

## 2023-02-06 PROCEDURE — 76060000031 HC ANESTHESIA FIRST 0.5 HR: Performed by: INTERNAL MEDICINE

## 2023-02-06 PROCEDURE — 2709999900 HC NON-CHARGEABLE SUPPLY: Performed by: INTERNAL MEDICINE

## 2023-02-06 PROCEDURE — 82962 GLUCOSE BLOOD TEST: CPT

## 2023-02-06 PROCEDURE — 74011250636 HC RX REV CODE- 250/636: Performed by: INTERNAL MEDICINE

## 2023-02-06 PROCEDURE — 74011250636 HC RX REV CODE- 250/636: Performed by: NURSE ANESTHETIST, CERTIFIED REGISTERED

## 2023-02-06 PROCEDURE — 76040000019: Performed by: INTERNAL MEDICINE

## 2023-02-06 PROCEDURE — 88305 TISSUE EXAM BY PATHOLOGIST: CPT

## 2023-02-06 PROCEDURE — 77030019988 HC FCPS ENDOSC DISP BSC -B: Performed by: INTERNAL MEDICINE

## 2023-02-06 RX ORDER — SODIUM CHLORIDE, SODIUM LACTATE, POTASSIUM CHLORIDE, CALCIUM CHLORIDE 600; 310; 30; 20 MG/100ML; MG/100ML; MG/100ML; MG/100ML
INJECTION, SOLUTION INTRAVENOUS
Status: DISCONTINUED | OUTPATIENT
Start: 2023-02-06 | End: 2023-02-06 | Stop reason: HOSPADM

## 2023-02-06 RX ORDER — SODIUM CHLORIDE, SODIUM LACTATE, POTASSIUM CHLORIDE, CALCIUM CHLORIDE 600; 310; 30; 20 MG/100ML; MG/100ML; MG/100ML; MG/100ML
50 INJECTION, SOLUTION INTRAVENOUS CONTINUOUS
Status: DISCONTINUED | OUTPATIENT
Start: 2023-02-06 | End: 2023-02-06 | Stop reason: HOSPADM

## 2023-02-06 RX ORDER — PROPOFOL 10 MG/ML
INJECTION, EMULSION INTRAVENOUS AS NEEDED
Status: DISCONTINUED | OUTPATIENT
Start: 2023-02-06 | End: 2023-02-06 | Stop reason: HOSPADM

## 2023-02-06 RX ORDER — SODIUM CHLORIDE 9 MG/ML
25 INJECTION, SOLUTION INTRAVENOUS CONTINUOUS
Status: DISCONTINUED | OUTPATIENT
Start: 2023-02-06 | End: 2023-02-06 | Stop reason: HOSPADM

## 2023-02-06 RX ADMIN — PROPOFOL 30 MG: 10 INJECTION, EMULSION INTRAVENOUS at 11:02

## 2023-02-06 RX ADMIN — SODIUM CHLORIDE, POTASSIUM CHLORIDE, SODIUM LACTATE AND CALCIUM CHLORIDE 50 ML/HR: 600; 310; 30; 20 INJECTION, SOLUTION INTRAVENOUS at 10:45

## 2023-02-06 RX ADMIN — PROPOFOL 50 MG: 10 INJECTION, EMULSION INTRAVENOUS at 11:00

## 2023-02-06 RX ADMIN — PROPOFOL 50 MG: 10 INJECTION, EMULSION INTRAVENOUS at 10:54

## 2023-02-06 RX ADMIN — PROPOFOL 50 MG: 10 INJECTION, EMULSION INTRAVENOUS at 10:51

## 2023-02-06 RX ADMIN — SODIUM CHLORIDE, POTASSIUM CHLORIDE, SODIUM LACTATE AND CALCIUM CHLORIDE: 600; 310; 30; 20 INJECTION, SOLUTION INTRAVENOUS at 10:48

## 2023-02-06 RX ADMIN — PROPOFOL 50 MG: 10 INJECTION, EMULSION INTRAVENOUS at 10:57

## 2023-02-06 NOTE — ANESTHESIA POSTPROCEDURE EVALUATION
Procedure(s):  COLONOSCOPY  ENDOSCOPIC POLYPECTOMY. total IV anesthesia, MAC    Anesthesia Post Evaluation      Multimodal analgesia: multimodal analgesia not used between 6 hours prior to anesthesia start to PACU discharge  Patient location during evaluation: bedside (Endoscopy suite)  Patient participation: complete - patient cannot participate  Level of consciousness: sleepy but conscious  Pain score: 0  Pain management: adequate  Airway patency: patent  Anesthetic complications: no  Cardiovascular status: acceptable  Respiratory status: acceptable and nasal cannula  Hydration status: acceptable  Comments: This patient remained on the stretcher. The patient was handed off to the endoscopy nursing team.  All questions regarding pre-, intra-, and postoperative care were answered.   Post anesthesia nausea and vomiting:  none  Final Post Anesthesia Temperature Assessment:  Normothermia (36.0-37.5 degrees C)      INITIAL Post-op Vital signs:   Vitals Value Taken Time   /65 02/06/23 1104   Temp 37 °C (98.6 °F) 02/06/23 1104   Pulse 93 02/06/23 1104   Resp 16 02/06/23 1104   SpO2 100 % 02/06/23 1104

## 2023-02-06 NOTE — PROGRESS NOTES
Bedside report given to Eliazar Schwarz RN in endo. Patient took Aspirin, Ibuprofen, and Toujeo this morning. Eliazar Schwarz will notify Dr. Marty Ivey of patients meds taken. Accuck 204, will check after procedure per Dr. Bernadette Ashton.

## 2023-02-06 NOTE — ANESTHESIA PREPROCEDURE EVALUATION
Relevant Problems   CARDIOVASCULAR   (+) Benign essential HTN   (+) Hypertension      PERSONAL HX & FAMILY HX OF CANCER   (+) Prostate cancer (HCC)       Anesthetic History   No history of anesthetic complications            Review of Systems / Medical History  Patient summary reviewed, nursing notes reviewed and pertinent labs reviewed    Pulmonary        Sleep apnea: CPAP  Smoker (1/2 ppd)         Neuro/Psych         Headaches     Cardiovascular    Hypertension          Hyperlipidemia         GI/Hepatic/Renal                Endo/Other    Diabetes: type 2, using insulin         Other Findings   Comments: Procedure Information    Case: [id number] Date/Time: 02/06/23 1040  Procedure: COLONOSCOPY  Anesthesia type: MAC  Diagnosis:        Screening for colon cancer (Z12.11)       Constipation, unspecified constipation type (K59.00)  Pre-op diagnosis:        Screening for colon cancer (Z12.11)       Constipation, unspecified constipation type (K59.00)  Location: [facility] ENDO 03 / [facility]  Providers: Adalid Arora MD            Medical History  GERD (gastroesophageal reflux disease)  Cancer ([facility] [address]  Diabetes ([facility] [address]  Constipation  Migraines  Colloid cyst of brain ([facility] [address]  Hypertension  Hyperlipidemia  Erectile dysfunction           Physical Exam    Airway  Mallampati: II  TM Distance: 4 - 6 cm  Neck ROM: normal range of motion   Mouth opening: Normal     Cardiovascular    Rhythm: regular  Rate: normal         Dental  No notable dental hx       Pulmonary  Breath sounds clear to auscultation               Abdominal  GI exam deferred       Other Findings            Anesthetic Plan    ASA: 3  Anesthesia type: total IV anesthesia and MAC    Monitoring Plan: Continuous noninvasive hemodynamic monitoring      Induction: Intravenous  Anesthetic plan and risks discussed with: Patient

## 2023-02-06 NOTE — ROUTINE PROCESS
Pt recovered on unit. Vitals remained stable. AVS reviewed with pt and pt's daughter. Both verbalized understanding. IV removed and discharged safely.

## 2023-02-06 NOTE — TELEPHONE ENCOUNTER
Pharmacy Progress Note       Followed up on the status of patient's CGM order. Still having issues finding a DME that will accept patient's insurance. Resent order to [facility] and will follow up on this again in 1 week. Of note, [facility] cancelled the patient's order stating that could not get into touch with him, but patient states that he never received a call. I have contacted the [address] [place] [address] Dr representative to see if he can reach out to the patient directly. Thank you,  Daisha Pacheco. Luis Antonio Dears, BCPS  Clinical Pharmacist           For Pharmacy Admin Tracking Only    Program: Medical Group  CPA in place: Yes  Recommendation Provided To:  Other: 1  Intervention Detail: Patient Access Assistance/Sample Provided  Intervention Accepted By: Other: 1  Time Spent (min): 30

## 2023-02-13 ENCOUNTER — TELEPHONE (OUTPATIENT)
Dept: PHARMACY | Age: 68
End: 2023-02-13

## 2023-02-13 NOTE — TELEPHONE ENCOUNTER
Pharmacy Progress Note       Spoke with patient and gave him contact information to confirm his Lulú Combs order with [facility]. Once patient is able to connect with the [facility], we can [facility] as soon as possible so we can take a look at blood sugars and make adjustments to his insulin regimen as appropriate. He expressed understanding. Thank you,  Madeline Tee.  Lana Garcia BCPS  Clinical Pharmacist             For Pharmacy Admin Tracking Only    Program: Medical Group  CPA in place: Yes  Recommendation Provided To: Patient/Caregiver: 0 via Telephone and Other: 1  Intervention Detail: Benefit Assistance  Intervention Accepted By: Patient/Caregiver: 0 and Other: 1  Time Spent (min): 20

## 2023-02-14 ENCOUNTER — OFFICE VISIT (OUTPATIENT)
Dept: FAMILY MEDICINE CLINIC | Age: 68
End: 2023-02-14
Payer: MEDICARE

## 2023-02-14 VITALS
SYSTOLIC BLOOD PRESSURE: 138 MMHG | TEMPERATURE: 97.8 F | WEIGHT: 176 LBS | HEIGHT: 69 IN | DIASTOLIC BLOOD PRESSURE: 88 MMHG | BODY MASS INDEX: 26.07 KG/M2 | HEART RATE: 91 BPM | OXYGEN SATURATION: 98 %

## 2023-02-14 DIAGNOSIS — I16.9 HYPERTENSIVE CRISIS: ICD-10-CM

## 2023-02-14 DIAGNOSIS — E11.65 UNCONTROLLED TYPE 2 DIABETES MELLITUS WITH HYPERGLYCEMIA (HCC): ICD-10-CM

## 2023-02-14 DIAGNOSIS — E78.2 MIXED HYPERLIPIDEMIA: ICD-10-CM

## 2023-02-14 DIAGNOSIS — I10 PRIMARY HYPERTENSION: Primary | ICD-10-CM

## 2023-02-14 PROCEDURE — 3017F COLORECTAL CA SCREEN DOC REV: CPT | Performed by: NURSE PRACTITIONER

## 2023-02-14 PROCEDURE — 3078F DIAST BP <80 MM HG: CPT | Performed by: NURSE PRACTITIONER

## 2023-02-14 PROCEDURE — G8417 CALC BMI ABV UP PARAM F/U: HCPCS | Performed by: NURSE PRACTITIONER

## 2023-02-14 PROCEDURE — 2022F DILAT RTA XM EVC RTNOPTHY: CPT | Performed by: NURSE PRACTITIONER

## 2023-02-14 PROCEDURE — G8536 NO DOC ELDER MAL SCRN: HCPCS | Performed by: NURSE PRACTITIONER

## 2023-02-14 PROCEDURE — 3074F SYST BP LT 130 MM HG: CPT | Performed by: NURSE PRACTITIONER

## 2023-02-14 PROCEDURE — 99214 OFFICE O/P EST MOD 30 MIN: CPT | Performed by: NURSE PRACTITIONER

## 2023-02-14 PROCEDURE — 1123F ACP DISCUSS/DSCN MKR DOCD: CPT | Performed by: NURSE PRACTITIONER

## 2023-02-14 PROCEDURE — G8432 DEP SCR NOT DOC, RNG: HCPCS | Performed by: NURSE PRACTITIONER

## 2023-02-14 PROCEDURE — 3046F HEMOGLOBIN A1C LEVEL >9.0%: CPT | Performed by: NURSE PRACTITIONER

## 2023-02-14 PROCEDURE — G8427 DOCREV CUR MEDS BY ELIG CLIN: HCPCS | Performed by: NURSE PRACTITIONER

## 2023-02-14 PROCEDURE — 1101F PT FALLS ASSESS-DOCD LE1/YR: CPT | Performed by: NURSE PRACTITIONER

## 2023-02-14 RX ORDER — METOPROLOL SUCCINATE 25 MG/1
25 TABLET, EXTENDED RELEASE ORAL DAILY
Qty: 90 TABLET | Refills: 3 | Status: SHIPPED | OUTPATIENT
Start: 2023-02-14

## 2023-02-14 RX ORDER — ATORVASTATIN CALCIUM 20 MG/1
20 TABLET, FILM COATED ORAL DAILY
Qty: 90 TABLET | Refills: 3 | Status: SHIPPED | OUTPATIENT
Start: 2023-02-14

## 2023-02-14 RX ORDER — LISINOPRIL 40 MG/1
40 TABLET ORAL DAILY
Qty: 90 TABLET | Refills: 3 | Status: SHIPPED | OUTPATIENT
Start: 2023-02-14

## 2023-02-14 RX ORDER — AMLODIPINE BESYLATE 5 MG/1
5 TABLET ORAL DAILY
Qty: 90 TABLET | Refills: 3 | Status: SHIPPED | OUTPATIENT
Start: 2023-02-14

## 2023-02-14 NOTE — PROGRESS NOTES
Subjective:   Mohan Carrasquillo is a 69 y.o. male  established here with complaints of   Chief Complaint   Patient presents with    Hypertension    Cholesterol Problem     Patient presenting for hypertension followup, hyperlipidemia check. Patient reports blood pressures at home most frequently not checked. Patient has symptoms of none of the following; no chest pain, shortness of breath, weakness, orthostatic hypotension, cough, myalgias, rash, headaches, weight gain, leg swelling, palpitations, slow heart rate, fatigue, depression. Patient reports good compliance with medications, no side effects from medications noted. Current treatments include diet modification, weight loss. Patient Active Problem List   Diagnosis Code    Prostate cancer ([facility] C61    Hypertension I10    Hyperlipidemia E78.5    Erectile dysfunction N52.9    Uncontrolled type 2 diabetes mellitus with hyperglycemia ([facility] E11.65    Benign essential HTN I10    Mixed hyperlipidemia E78.2    Nocturia R35.1    Tobacco dependence F17.200    Glycosuria R81    DKA (diabetic ketoacidosis) ([facility]) E11.10    Elevated troponin R77.8     Patient Active Problem List    Diagnosis Date Noted    DKA (diabetic ketoacidosis) ([facility] 08/31/2022    Elevated troponin 08/31/2022    Glycosuria 02/28/2022    Nocturia 02/15/2022    Tobacco dependence 02/15/2022    Uncontrolled type 2 diabetes mellitus with hyperglycemia ([facility] 02/09/2022    Benign essential HTN 02/09/2022    Mixed hyperlipidemia 02/09/2022    Hypertension     Hyperlipidemia     Erectile dysfunction     Prostate cancer ([facility] 08/04/2011     Current Outpatient Medications   Medication Sig Dispense Refill    amLODIPine (NORVASC) 5 mg tablet Take 1 Tablet by mouth daily. Indications: high blood pressure 90 Tablet 3    atorvastatin (LIPITOR) 20 mg tablet Take 1 Tablet by mouth daily. 90 Tablet 3    lisinopriL (PRINIVIL, ZESTRIL) 40 mg tablet Take 1 Tablet by mouth daily.  90 Tablet 3    metoprolol succinate (TOPROL-XL) 25 mg XL tablet Take 1 Tablet by mouth daily. 90 Tablet 3    diclofenac (SOLARAZE) 3 % topical gel Apply  to affected area two (2) times a day. 100 g 0    linaCLOtide (Linzess) 72 mcg cap capsule Take 1 Capsule by mouth Daily (before breakfast). Indications: chronic idiopathic constipation 90 Capsule 3    insulin glargine U-300 conc (Toujeo SoloStar U-300 Insulin) 300 unit/mL (1.5 mL) inpn pen 20 Units by SubCUTAneous route two (2) times a day. Inject 20 units every morning 6 Adjustable Dose Pre-filled Pen Syringe 3    Janumet -1,000 mg TM24 TAKE 1 TABLET BY MOUTH DAILY 90 Tablet 1    ibuprofen (MOTRIN) 600 mg tablet TAKE 1 TABLET BY MOUTH EVERY 6 HOURS AS NEEDED FOR PAIN 90 Tablet 5    Insulin Needles, Disposable, 31 gauge x 5/16\" ndle Once a day 100 Each 3    OneTouch Delica Plus Lancet 33 gauge misc USE TO TEST BLOOD GLUCOSE THREE TIMES DAILY      lancets misc Test 3 times daily. Dx code E11.65  Insurance brand of choice 300 Each 6    Blood-Glucose Meter monitoring kit Finger stick glucose TID DX E11.65  Insurance brand of choice. 1 Kit 0    glucose blood VI test strips (ASCENSIA AUTODISC VI, ONE TOUCH ULTRA TEST VI) strip Finger stick glucose TID DX E11.65  Insurance brand of choice. 300 Strip 3    aspirin 81 mg chewable tablet Take 81 mg by mouth daily. glucose blood VI test strips (ONETOUCH VERIO) strip Test 3 times daily. Dx code 250.00 100 Strip 6    tamsulosin (Flomax) 0.4 mg capsule Take 1 Capsule by mouth daily.  Indications: enlarged prostate with urination problem (Patient not taking: Reported on 2/14/2023) 90 Capsule 3     No Known Allergies  Past Medical History:   Diagnosis Date    Cancer [facility])     prostate    Colloid cyst of brain ([address] 06/2011    S/P excision cyst at [place] [address] center    Constipation     Diabetes [facility])     IDDM    Erectile dysfunction     GERD (gastroesophageal reflux disease)     Hyperlipidemia     Hypertension     Migraines      Past Surgical History: Procedure Laterality Date    COLONOSCOPY N/A 2/6/2023    COLONOSCOPY performed by Waldo Brown MD at [facility] ENDOSCOPY    HX GI  2012    HX HEENT  2006    exc mass right neck    HX OTHER SURGICAL  2012    brain    HX UROLOGICAL      prostate bx    [place] UNLISTED NEUROLOGICAL/NEUROMUSCULAR DX PX  6-2011    removal cranial colloid cyst at [facility]     Family History   Problem Relation Age of Onset    Diabetes Mother     Heart Disease Mother     Stroke Mother     Heart Disease Father     Stroke Father     Hypertension Brother     Elevated Lipids Brother      Social History     Tobacco Use    Smoking status: Every Day     Packs/day: 0.50     Years: 53.00     Pack years: 26.50     Types: Cigarettes    Smokeless tobacco: Never    Tobacco comments:     Started smoking at age 11   Substance Use Topics    Alcohol use: Not Currently      Review of Systems    A comprehensive review of systems was negative except for that written in the HPI. Objective:     Visit Vitals  /88 (BP 1 Location: Left upper arm, BP Patient Position: Sitting, BP Cuff Size: Large adult) Comment: manual   Pulse 91   Temp 97.8 °F (36.6 °C) (Temporal)   Ht 5' 9\" (1.753 m)   Wt 176 lb (79.8 kg)   SpO2 98%   BMI 25.99 kg/m²     General:  Alert, cooperative, no distress, appears stated age. Head:  Normocephalic, without obvious abnormality, atraumatic. Eyes:  Conjunctivae/corneas clear. PERRL, EOMs intact. Fundi benign   Ears:  Normal TMs and external ear canals both ears. Nose: Nares normal. Septum midline. Mucosa normal. No drainage or sinus tenderness. Throat: Lips, mucosa, and tongue normal. Teeth and gums normal.   Neck: Supple, symmetrical, trachea midline, no adenopathy, thyroid: no enlargement/tenderness/nodules, no carotid bruit and no JVD. Back:   Symmetric, no curvature. ROM normal. No CVA tenderness. Lungs:   Clear to auscultation bilaterally. Chest wall:  No tenderness or deformity.    Heart:  Regular rate and rhythm, S1, S2 normal, no murmur, click, rub or gallop. Abdomen:   Soft, non-tender. Bowel sounds normal. No masses,  No organomegaly. Genitalia:  Normal male without lesion, discharge or tenderness. Rectal:  Normal tone, normal prostate, no masses or tenderness  Guaiac negative stool. Extremities: Extremities normal, atraumatic, no cyanosis or edema. Pulses: 2+ and symmetric all extremities. Skin: Skin color, texture, turgor normal. No rashes or lesions   Lymph nodes: Cervical, supraclavicular, and axillary nodes normal.   Neurologic: CNII-XII intact. Normal strength, sensation and reflexes throughout. Assessment/Plan:       ICD-10-CM ICD-9-CM    1. Primary hypertension  I10 401.9 lisinopriL (PRINIVIL, ZESTRIL) 40 mg tablet      2. Uncontrolled type 2 diabetes mellitus with hyperglycemia (HCC)  E11.65 250.02       3. Mixed hyperlipidemia  E78.2 272.2 atorvastatin (LIPITOR) 20 mg tablet      4. Hypertensive crisis  I16.9 401.9 amLODIPine (NORVASC) 5 mg tablet      metoprolol succinate (TOPROL-XL) 25 mg XL tablet        Follow-up and Dispositions    Return in about 3 months (around 5/14/2023) for HTN, Hyperlipidemia, Lab review, DM. Patient stable we will not make any changes to current regimen and he is to continue taking medication as directed    Issues discussed include maintaining a proper diet, maintaining medication compliance, medication side effects, future potential medication changes if needed. Treatment plan: Continue current medications, labwork from today discussed and reviewed, future labwork ordered, A1C, lipids check, patient up to date on maintence and screening exams. On this date 02/14/2023 I have spent 32 minutes reviewing previous notes, test results and face to face (virtual) with the patient discussing the diagnosis and importance of compliance with the treatment plan as well as documenting on the day of the visit.    Aspects of this note may have been generated using voice recognition software. Despite editing, there may be some syntax errors.     Vanessa Hernández, NP

## 2023-02-14 NOTE — PROGRESS NOTES
1. Have you been to the ER, urgent care clinic since your last visit? Hospitalized since your last visit? No    2. Have you seen or consulted any other health care providers outside of the [address] [place] [address] since your last visit? Include any pap smears or colon screening.  No  Chief Complaint   Patient presents with    Hypertension    Cholesterol Problem     Visit Vitals  BP (!) 138/90 (BP 1 Location: Left upper arm, BP Patient Position: Sitting, BP Cuff Size: Large adult) Comment: manual   Pulse 91   Temp 97.8 °F (36.6 °C) (Temporal)   Ht 5' 9\" (1.753 m)   Wt 176 lb (79.8 kg)   SpO2 98%   BMI 25.99 kg/m²

## 2023-02-17 ENCOUNTER — TELEPHONE (OUTPATIENT)
Dept: PHARMACY | Age: 68
End: 2023-02-17

## 2023-02-17 NOTE — TELEPHONE ENCOUNTER
Received confirmation from [facility] that patient's CGM is getting ready to be shipped. Once patient obtains device will schedule follow up for education. Thank you,  Gerlean Simmonds. Lyly Ramirez BCPS  Clinical Pharmacist       For Pharmacy Admin Tracking Only    Program: Medical Group  CPA in place: Yes  Recommendation Provided To:  Other: 1  Intervention Detail: Patient Access Assistance/Sample Provided  Intervention Accepted By: Other: 1  Time Spent (min): 10

## 2023-02-22 ENCOUNTER — TELEPHONE (OUTPATIENT)
Dept: PHARMACY | Age: 68
End: 2023-02-22

## 2023-02-22 NOTE — TELEPHONE ENCOUNTER
Pharmacy Progress Note       Confirmed with [facility] [employer] that patient's CGM was shipped on 2/20/23. Left voicemail for patient to see if he has received the device yet so that we can set up an appointment for education. Thank you,  Ranulfo Dunne. Warden Saha BCPS  Clinical Pharmacist               For Pharmacy Admin Tracking Only    Program: Medical Group  CPA in place: Yes  Recommendation Provided To:  Other: 1  Intervention Detail: Benefit Assistance  Intervention Accepted By: Other: 1  Time Spent (min): 10

## 2023-03-01 ENCOUNTER — TELEPHONE (OUTPATIENT)
Dept: PHARMACY | Age: 68
End: 2023-03-01

## 2023-03-01 NOTE — TELEPHONE ENCOUNTER
Pharmacy Progress Note       Spoke with patient to confirm receipt of Freestyle Joselin 2 CGM device. He states that it was delivered but he has not been able to set it up yet. Scheduled patient for virtual visit on 3/8/23 at 9 AM to review device. Thank you,  Milind Smith.  Sandra Gould BCPS  Clinical Pharmacist   [facility]ication [facility]              For Pharmacy Admin Tracking Only    Program: Medical Group  CPA in place: Yes  Recommendation Provided To: Patient/Caregiver: 1 via Telephone  Intervention Detail: Scheduled Appointment  Intervention Accepted By: Patient/Caregiver: 1  Gap Closed?: No  Time Spent (min): 10

## 2023-03-10 ENCOUNTER — TELEPHONE (OUTPATIENT)
Dept: PHARMACY | Age: 68
End: 2023-03-10

## 2023-03-10 DIAGNOSIS — R52 PAIN: ICD-10-CM

## 2023-03-10 RX ORDER — IBUPROFEN 600 MG/1
TABLET ORAL
Qty: 90 TABLET | Refills: 5 | Status: SHIPPED | OUTPATIENT
Start: 2023-03-10

## 2023-03-10 NOTE — TELEPHONE ENCOUNTER
Pharmacy Progress Note       Called patient to reschedule appointment for CGM education. Patient confirmed that he is able to complete a virtual visit. Appointment scheduled for 3/15 at 10 AM.       Thank you,  Charito Rowan, [facility]  Clinical Pharmacist             For Pharmacy Admin Tracking Only    Program: Medical Group  CPA in place: Yes  Recommendation Provided To: Patient/Caregiver: 1 via Telephone  Intervention Detail: Scheduled Appointment  Intervention Accepted By: Patient/Caregiver: 1  Gap Closed?: No  Time Spent (min): 5

## 2023-03-20 ENCOUNTER — TELEPHONE (OUTPATIENT)
Dept: FAMILY MEDICINE CLINIC | Age: 68
End: 2023-03-20

## 2023-03-20 NOTE — TELEPHONE ENCOUNTER
----- Message from Vanessa Hernández NP sent at 3/17/2023  2:58 PM EDT -----  Please call the patient regarding his abnormal result. A1c improved from previous however still elevated at this time would want for him to increase his Lantus from 20 units twice a day to 35 units twice a day and we will recheck his A1c at follow-up visit. All other results within normal limits no other changes to his regimen.

## 2023-03-20 NOTE — TELEPHONE ENCOUNTER
----- Message from Marisa Pina NP sent at 3/17/2023  2:58 PM EDT -----  Please call the patient regarding his abnormal result. A1c improved from previous however still elevated at this time would want for him to increase his Lantus from 20 units twice a day to 35 units twice a day and we will recheck his A1c at follow-up visit. All other results within normal limits no other changes to his regimen.

## 2023-05-15 RX ORDER — METOPROLOL SUCCINATE 25 MG/1
25 TABLET, EXTENDED RELEASE ORAL DAILY
COMMUNITY
Start: 2023-02-14

## 2023-05-15 RX ORDER — AMLODIPINE BESYLATE 5 MG/1
5 TABLET ORAL DAILY
COMMUNITY
Start: 2023-02-14 | End: 2023-05-16 | Stop reason: SDUPTHER

## 2023-05-15 RX ORDER — DICLOFENAC SODIUM 30 MG/G
GEL TOPICAL 2 TIMES DAILY
COMMUNITY
Start: 2023-01-14 | End: 2023-05-17 | Stop reason: ALTCHOICE

## 2023-05-15 RX ORDER — ATORVASTATIN CALCIUM 20 MG/1
20 TABLET, FILM COATED ORAL DAILY
COMMUNITY
Start: 2023-02-14

## 2023-05-15 RX ORDER — INSULIN GLARGINE 300 U/ML
20 INJECTION, SOLUTION SUBCUTANEOUS 2 TIMES DAILY
COMMUNITY
Start: 2022-10-18 | End: 2023-05-17 | Stop reason: SDUPTHER

## 2023-05-15 RX ORDER — TAMSULOSIN HYDROCHLORIDE 0.4 MG/1
0.4 CAPSULE ORAL DAILY
COMMUNITY
Start: 2023-01-06 | End: 2023-05-17 | Stop reason: ALTCHOICE

## 2023-05-15 RX ORDER — SITAGLIPTIN AND METFORMIN HYDROCHLORIDE 1000; 100 MG/1; MG/1
1 TABLET, FILM COATED, EXTENDED RELEASE ORAL DAILY
COMMUNITY
Start: 2022-09-20

## 2023-05-15 RX ORDER — LISINOPRIL 40 MG/1
40 TABLET ORAL DAILY
COMMUNITY
Start: 2023-02-14

## 2023-05-15 RX ORDER — IBUPROFEN 600 MG/1
1 TABLET ORAL EVERY 6 HOURS PRN
COMMUNITY
Start: 2023-03-10 | End: 2023-05-17 | Stop reason: ALTCHOICE

## 2023-05-16 ENCOUNTER — OFFICE VISIT (OUTPATIENT)
Facility: CLINIC | Age: 68
End: 2023-05-16
Payer: MEDICARE

## 2023-05-16 VITALS
BODY MASS INDEX: 26.69 KG/M2 | HEIGHT: 69 IN | TEMPERATURE: 97.8 F | WEIGHT: 180.2 LBS | OXYGEN SATURATION: 98 % | SYSTOLIC BLOOD PRESSURE: 166 MMHG | DIASTOLIC BLOOD PRESSURE: 84 MMHG | HEART RATE: 71 BPM

## 2023-05-16 DIAGNOSIS — I10 ESSENTIAL (PRIMARY) HYPERTENSION: Primary | ICD-10-CM

## 2023-05-16 DIAGNOSIS — E78.2 MIXED HYPERLIPIDEMIA: ICD-10-CM

## 2023-05-16 DIAGNOSIS — Z79.4 TYPE 2 DIABETES MELLITUS WITH CHRONIC KIDNEY DISEASE, WITH LONG-TERM CURRENT USE OF INSULIN, UNSPECIFIED CKD STAGE (HCC): ICD-10-CM

## 2023-05-16 DIAGNOSIS — E11.22 TYPE 2 DIABETES MELLITUS WITH CHRONIC KIDNEY DISEASE, WITH LONG-TERM CURRENT USE OF INSULIN, UNSPECIFIED CKD STAGE (HCC): ICD-10-CM

## 2023-05-16 DIAGNOSIS — N18.31 STAGE 3A CHRONIC KIDNEY DISEASE (HCC): ICD-10-CM

## 2023-05-16 DIAGNOSIS — Z12.5 SCREENING FOR MALIGNANT NEOPLASM OF PROSTATE: ICD-10-CM

## 2023-05-16 PROBLEM — N18.30 CHRONIC RENAL DISEASE, STAGE III (HCC): Status: ACTIVE | Noted: 2023-05-16

## 2023-05-16 PROCEDURE — 99215 OFFICE O/P EST HI 40 MIN: CPT | Performed by: NURSE PRACTITIONER

## 2023-05-16 PROCEDURE — 3078F DIAST BP <80 MM HG: CPT | Performed by: NURSE PRACTITIONER

## 2023-05-16 PROCEDURE — 3074F SYST BP LT 130 MM HG: CPT | Performed by: NURSE PRACTITIONER

## 2023-05-16 PROCEDURE — 3046F HEMOGLOBIN A1C LEVEL >9.0%: CPT | Performed by: NURSE PRACTITIONER

## 2023-05-16 PROCEDURE — 1123F ACP DISCUSS/DSCN MKR DOCD: CPT | Performed by: NURSE PRACTITIONER

## 2023-05-16 RX ORDER — ASPIRIN 81 MG/1
81 TABLET, CHEWABLE ORAL DAILY
COMMUNITY

## 2023-05-16 RX ORDER — AMLODIPINE BESYLATE 10 MG/1
10 TABLET ORAL DAILY
Qty: 90 TABLET | Refills: 3 | Status: SHIPPED | OUTPATIENT
Start: 2023-05-16

## 2023-05-16 NOTE — PROGRESS NOTES
1. Have you been to the ER, urgent care clinic since your last visit? Hospitalized since your last visit? No    2. Have you seen or consulted any other health care providers outside of the [address] [place] [address] since your last visit? Include any pap smears or colon screening.  Yes, [facility]  Chief Complaint   Patient presents with    Follow-up    Diabetes    Hypertension    Discuss Labs    Pre-op Exam   ]BP (!) 170/87 (Site: Left Upper Arm, Position: Sitting, Cuff Size: Large Adult)   Pulse 71   Temp 97.8 °F (36.6 °C) (Temporal)   Ht 5' 9\" (1.753 m)   Wt 180 lb 3.2 oz (81.7 kg)   SpO2 98%   BMI 26.61 kg/m²   BP (!) 166/84 (Site: Right Upper Arm, Position: Sitting, Cuff Size: Large Adult)   Pulse 71   Temp 97.8 °F (36.6 °C) (Temporal)   Ht 5' 9\" (1.753 m)   Wt 180 lb 3.2 oz (81.7 kg)   SpO2 98%   BMI 26.61 kg/m²

## 2023-05-17 ENCOUNTER — TELEPHONE (OUTPATIENT)
Facility: CLINIC | Age: 68
End: 2023-05-17

## 2023-05-17 PROBLEM — E11.22 TYPE 2 DIABETES MELLITUS WITH CHRONIC KIDNEY DISEASE, WITH LONG-TERM CURRENT USE OF INSULIN (HCC): Status: ACTIVE | Noted: 2023-05-17

## 2023-05-17 PROBLEM — Z79.4 TYPE 2 DIABETES MELLITUS WITH CHRONIC KIDNEY DISEASE, WITH LONG-TERM CURRENT USE OF INSULIN (HCC): Status: ACTIVE | Noted: 2023-05-17

## 2023-05-17 RX ORDER — PEN NEEDLE, DIABETIC 30 GX5/16"
1 NEEDLE, DISPOSABLE MISCELLANEOUS 2 TIMES DAILY
Qty: 100 EACH | Refills: 3 | Status: SHIPPED | OUTPATIENT
Start: 2023-05-17

## 2023-05-17 RX ORDER — INSULIN GLARGINE 300 U/ML
30 INJECTION, SOLUTION SUBCUTANEOUS 2 TIMES DAILY
Qty: 6 ML | Refills: 5 | Status: SHIPPED | OUTPATIENT
Start: 2023-05-17

## 2023-05-17 NOTE — TELEPHONE ENCOUNTER
Please call pt and ask him to get blood draw in the next couple days to recheck his a1c, liver, kidneys, etc.   Insulin have been increased to 30 units BID as well. Still need to f/u next Tuesday for BP check.

## 2023-05-17 NOTE — PROGRESS NOTES
Preoperative Consultation      Marquis Goltz  YOB: 1955    Date of Service:  5/16/2023    Vitals:    05/16/23 1539 05/16/23 1555   BP: (!) 170/87 (!) 166/84   Site: Left Upper Arm Right Upper Arm   Position: Sitting Sitting   Cuff Size: Large Adult Large Adult   Pulse: 71    Temp: 97.8 °F (36.6 °C)    TempSrc: Temporal    SpO2: 98%    Weight: 180 lb 3.2 oz (81.7 kg)    Height: 5' 9\" (1.753 m)       Wt Readings from Last 2 Encounters:   05/16/23 180 lb 3.2 oz (81.7 kg)   02/14/23 176 lb (79.8 kg)     BP Readings from Last 3 Encounters:   05/16/23 (!) 166/84   02/14/23 138/88   01/06/23 (!) 202/111        Chief Complaint   Patient presents with    Follow-up    Diabetes    Hypertension    Discuss Labs    Pre-op Exam     No Known Allergies  Outpatient Medications Marked as Taking for the 5/16/23 encounter (Office Visit) with INDY Viera CNP   Medication Sig Dispense Refill    insulin glargine, 1 unit dial, (TOUJEO SOLOSTAR) 300 UNIT/ML concentrated injection pen Inject 30 Units into the skin 2 times daily 6 mL 5    Insulin Pen Needle (PEN NEEDLES 3/16\") 31G X 5 MM MISC 1 each by Does not apply route 2 times daily 100 each 3    aspirin 81 MG chewable tablet Take 1 tablet by mouth daily      amLODIPine (NORVASC) 10 MG tablet Take 1 tablet by mouth daily 90 tablet 3    SITagliptin-metFORMIN HCl ER (JANUMET XR) 100-1000 MG TB24 Take 1 tablet by mouth daily      metoprolol succinate (TOPROL XL) 25 MG extended release tablet Take 1 tablet by mouth daily      lisinopril (PRINIVIL;ZESTRIL) 40 MG tablet Take 1 tablet by mouth daily      atorvastatin (LIPITOR) 20 MG tablet Take 1 tablet by mouth daily         This patient presents to the office today for a preoperative consultation at the request of surgeon, optometry, who plans on performing cataract on [address] . The current problem began 12 months ago, and symptoms have been worsening with time. Conservative therapy: N/A.     Planned

## 2023-05-18 ENCOUNTER — TELEPHONE (OUTPATIENT)
Facility: CLINIC | Age: 68
End: 2023-05-18

## 2023-05-18 DIAGNOSIS — I73.9 PAD (PERIPHERAL ARTERY DISEASE) (HCC): Primary | ICD-10-CM

## 2023-05-23 ENCOUNTER — NURSE ONLY (OUTPATIENT)
Facility: CLINIC | Age: 68
End: 2023-05-23

## 2023-05-23 VITALS — DIASTOLIC BLOOD PRESSURE: 70 MMHG | SYSTOLIC BLOOD PRESSURE: 187 MMHG

## 2023-05-23 DIAGNOSIS — I10 ESSENTIAL HYPERTENSION, MALIGNANT: Primary | ICD-10-CM

## 2023-05-23 RX ORDER — CLONIDINE 0.2 MG/24H
1 PATCH, EXTENDED RELEASE TRANSDERMAL WEEKLY
Qty: 12 PATCH | Refills: 3 | Status: SHIPPED | OUTPATIENT
Start: 2023-05-23

## 2023-05-23 SDOH — ECONOMIC STABILITY: FOOD INSECURITY: WITHIN THE PAST 12 MONTHS, YOU WORRIED THAT YOUR FOOD WOULD RUN OUT BEFORE YOU GOT MONEY TO BUY MORE.: NEVER TRUE

## 2023-05-23 SDOH — ECONOMIC STABILITY: HOUSING INSECURITY
IN THE LAST 12 MONTHS, WAS THERE A TIME WHEN YOU DID NOT HAVE A STEADY PLACE TO SLEEP OR SLEPT IN A SHELTER (INCLUDING NOW)?: NO

## 2023-05-23 SDOH — ECONOMIC STABILITY: INCOME INSECURITY: HOW HARD IS IT FOR YOU TO PAY FOR THE VERY BASICS LIKE FOOD, HOUSING, MEDICAL CARE, AND HEATING?: NOT HARD AT ALL

## 2023-05-23 SDOH — ECONOMIC STABILITY: FOOD INSECURITY: WITHIN THE PAST 12 MONTHS, THE FOOD YOU BOUGHT JUST DIDN'T LAST AND YOU DIDN'T HAVE MONEY TO GET MORE.: NEVER TRUE

## 2023-05-23 NOTE — PROGRESS NOTES
Provider made aware of blood pressure readings and she prescribed new clonidine patch.  Patient is to come back for blood pressure recheck on 5/2623

## 2023-05-24 LAB
ALBUMIN SERPL-MCNC: 4.4 G/DL (ref 3.8–4.8)
ALBUMIN/GLOB SERPL: 1.6 {RATIO} (ref 1.2–2.2)
ALP SERPL-CCNC: 72 IU/L (ref 44–121)
ALT SERPL-CCNC: 33 IU/L (ref 0–44)
AST SERPL-CCNC: 13 IU/L (ref 0–40)
BASOPHILS # BLD AUTO: 0.1 X10E3/UL (ref 0–0.2)
BASOPHILS NFR BLD AUTO: 1 %
BILIRUB SERPL-MCNC: 0.4 MG/DL (ref 0–1.2)
BUN SERPL-MCNC: 14 MG/DL (ref 8–27)
BUN/CREAT SERPL: 12 (ref 10–24)
CALCIUM SERPL-MCNC: 10.1 MG/DL (ref 8.6–10.2)
CHLORIDE SERPL-SCNC: 97 MMOL/L (ref 96–106)
CHOLEST SERPL-MCNC: 149 MG/DL (ref 100–199)
CO2 SERPL-SCNC: 24 MMOL/L (ref 20–29)
CREAT SERPL-MCNC: 1.18 MG/DL (ref 0.76–1.27)
EGFRCR SERPLBLD CKD-EPI 2021: 67 ML/MIN/1.73
EOSINOPHIL # BLD AUTO: 0.3 X10E3/UL (ref 0–0.4)
EOSINOPHIL NFR BLD AUTO: 3 %
ERYTHROCYTE [DISTWIDTH] IN BLOOD BY AUTOMATED COUNT: 13 % (ref 11.6–15.4)
GLOBULIN SER CALC-MCNC: 2.8 G/DL (ref 1.5–4.5)
GLUCOSE SERPL-MCNC: 301 MG/DL (ref 70–99)
HBA1C MFR BLD: 8.7 % (ref 4.8–5.6)
HCT VFR BLD AUTO: 44.1 % (ref 37.5–51)
HDLC SERPL-MCNC: 40 MG/DL
HGB BLD-MCNC: 14.5 G/DL (ref 13–17.7)
IMM GRANULOCYTES # BLD AUTO: 0 X10E3/UL (ref 0–0.1)
IMM GRANULOCYTES NFR BLD AUTO: 0 %
LDLC SERPL CALC-MCNC: 64 MG/DL (ref 0–99)
LYMPHOCYTES # BLD AUTO: 3.8 X10E3/UL (ref 0.7–3.1)
LYMPHOCYTES NFR BLD AUTO: 34 %
MCH RBC QN AUTO: 28.9 PG (ref 26.6–33)
MCHC RBC AUTO-ENTMCNC: 32.9 G/DL (ref 31.5–35.7)
MCV RBC AUTO: 88 FL (ref 79–97)
MONOCYTES # BLD AUTO: 1 X10E3/UL (ref 0.1–0.9)
MONOCYTES NFR BLD AUTO: 9 %
NEUTROPHILS # BLD AUTO: 6 X10E3/UL (ref 1.4–7)
NEUTROPHILS NFR BLD AUTO: 53 %
PLATELET # BLD AUTO: 250 X10E3/UL (ref 150–450)
POTASSIUM SERPL-SCNC: 5.7 MMOL/L (ref 3.5–5.2)
PROT SERPL-MCNC: 7.2 G/DL (ref 6–8.5)
PSA SERPL-MCNC: <0.1 NG/ML (ref 0–4)
RBC # BLD AUTO: 5.01 X10E6/UL (ref 4.14–5.8)
SODIUM SERPL-SCNC: 136 MMOL/L (ref 134–144)
TRIGL SERPL-MCNC: 283 MG/DL (ref 0–149)
VLDLC SERPL CALC-MCNC: 45 MG/DL (ref 5–40)
WBC # BLD AUTO: 11.2 X10E3/UL (ref 3.4–10.8)

## 2023-05-26 ENCOUNTER — NURSE ONLY (OUTPATIENT)
Facility: CLINIC | Age: 68
End: 2023-05-26
Payer: MEDICARE

## 2023-05-26 VITALS — SYSTOLIC BLOOD PRESSURE: 134 MMHG | DIASTOLIC BLOOD PRESSURE: 76 MMHG

## 2023-05-26 DIAGNOSIS — Z79.4 TYPE 2 DIABETES MELLITUS WITH CHRONIC KIDNEY DISEASE, WITH LONG-TERM CURRENT USE OF INSULIN, UNSPECIFIED CKD STAGE (HCC): Primary | ICD-10-CM

## 2023-05-26 DIAGNOSIS — E11.22 TYPE 2 DIABETES MELLITUS WITH CHRONIC KIDNEY DISEASE, WITH LONG-TERM CURRENT USE OF INSULIN, UNSPECIFIED CKD STAGE (HCC): Primary | ICD-10-CM

## 2023-05-26 LAB — GLUCOSE, POC: 413 MG/DL

## 2023-05-26 PROCEDURE — 82962 GLUCOSE BLOOD TEST: CPT | Performed by: NURSE PRACTITIONER

## 2023-05-26 NOTE — PROGRESS NOTES
BP (!) 177/78 (Site: Left Upper Arm, Position: Sitting, Cuff Size: Large Adult)       BP (!) 177/78 (Site: Left Upper Arm, Position: Sitting, Cuff Size: Large Adult)     413    Per provider patient is to wear the patch until tues and take insulin as directed     Come in tues for BP recheck

## 2023-06-07 ENCOUNTER — TELEPHONE (OUTPATIENT)
Facility: CLINIC | Age: 68
End: 2023-06-07

## 2023-06-07 NOTE — TELEPHONE ENCOUNTER
Message has been forwarded to provider. Patient is asking if he is clear for surgery.  Last bp reading was 134/76 on 5/26/23

## 2023-06-07 NOTE — TELEPHONE ENCOUNTER
----- Message from Melisa Lefort sent at 6/6/2023  3:22 PM EDT -----  Subject: Message to Provider    QUESTIONS  Information for Provider? Patient needs to know if they are cleared for   eye surgery. Tried calling but could not get through to the office. ---------------------------------------------------------------------------  --------------  Roberta Perea [facility]  [phone]; OK to leave message on voicemail  ---------------------------------------------------------------------------  --------------  SCRIPT ANSWERS  Relationship to Patient? Spouse/Partner  Representative Name? Matt Perez  Is the representative on the Communication Release of Information (PERCY)   form in Epic?  Yes

## 2023-07-22 DIAGNOSIS — Z79.4 TYPE 2 DIABETES MELLITUS WITH CHRONIC KIDNEY DISEASE, WITH LONG-TERM CURRENT USE OF INSULIN, UNSPECIFIED CKD STAGE (HCC): ICD-10-CM

## 2023-07-22 DIAGNOSIS — E11.22 TYPE 2 DIABETES MELLITUS WITH CHRONIC KIDNEY DISEASE, WITH LONG-TERM CURRENT USE OF INSULIN, UNSPECIFIED CKD STAGE (HCC): ICD-10-CM

## 2023-07-24 RX ORDER — SITAGLIPTIN AND METFORMIN HYDROCHLORIDE 1000; 100 MG/1; MG/1
1 TABLET, FILM COATED, EXTENDED RELEASE ORAL DAILY
Qty: 90 TABLET | Refills: 1 | Status: SHIPPED | OUTPATIENT
Start: 2023-07-24

## 2023-07-24 NOTE — TELEPHONE ENCOUNTER
Last OV: 5/16/23  Next OV: 8/22/23  Last Refill: 9/20/22 (QTY 90; refills 1)  Last A1c: 5/23/23 (8.7)    Will send to provider for review/auth

## 2023-07-25 DIAGNOSIS — I10 ESSENTIAL (PRIMARY) HYPERTENSION: ICD-10-CM

## 2023-07-26 RX ORDER — LISINOPRIL 40 MG/1
40 TABLET ORAL DAILY
Qty: 90 TABLET | OUTPATIENT
Start: 2023-07-26

## 2023-07-26 NOTE — TELEPHONE ENCOUNTER
Last OV: 5/16/23  Next OV: 8/22/23  Last Refill: 2/14/23 (QTY 90, refills 3)  Last CMP: 5/23/23    Last Rx was sent over for a years worth of med. Called pharmacy for clarification, and they stated they will use the rx from FEB to fill med.

## 2023-08-22 ENCOUNTER — OFFICE VISIT (OUTPATIENT)
Facility: CLINIC | Age: 68
End: 2023-08-22
Payer: MEDICARE

## 2023-08-22 VITALS
SYSTOLIC BLOOD PRESSURE: 152 MMHG | HEIGHT: 69 IN | BODY MASS INDEX: 25.24 KG/M2 | OXYGEN SATURATION: 95 % | WEIGHT: 170.4 LBS | TEMPERATURE: 97.7 F | DIASTOLIC BLOOD PRESSURE: 80 MMHG | HEART RATE: 105 BPM

## 2023-08-22 DIAGNOSIS — I10 ESSENTIAL (PRIMARY) HYPERTENSION: Primary | ICD-10-CM

## 2023-08-22 DIAGNOSIS — E78.2 MIXED HYPERLIPIDEMIA: ICD-10-CM

## 2023-08-22 DIAGNOSIS — E11.22 TYPE 2 DIABETES MELLITUS WITH CHRONIC KIDNEY DISEASE, WITH LONG-TERM CURRENT USE OF INSULIN, UNSPECIFIED CKD STAGE (HCC): ICD-10-CM

## 2023-08-22 DIAGNOSIS — Z11.59 NEED FOR HEPATITIS C SCREENING TEST: ICD-10-CM

## 2023-08-22 DIAGNOSIS — Z79.4 TYPE 2 DIABETES MELLITUS WITH CHRONIC KIDNEY DISEASE, WITH LONG-TERM CURRENT USE OF INSULIN, UNSPECIFIED CKD STAGE (HCC): ICD-10-CM

## 2023-08-22 DIAGNOSIS — N18.31 STAGE 3A CHRONIC KIDNEY DISEASE (HCC): ICD-10-CM

## 2023-08-22 PROCEDURE — 3074F SYST BP LT 130 MM HG: CPT | Performed by: NURSE PRACTITIONER

## 2023-08-22 PROCEDURE — 3078F DIAST BP <80 MM HG: CPT | Performed by: NURSE PRACTITIONER

## 2023-08-22 PROCEDURE — 99214 OFFICE O/P EST MOD 30 MIN: CPT | Performed by: NURSE PRACTITIONER

## 2023-08-22 PROCEDURE — 3052F HG A1C>EQUAL 8.0%<EQUAL 9.0%: CPT | Performed by: NURSE PRACTITIONER

## 2023-08-22 PROCEDURE — 1123F ACP DISCUSS/DSCN MKR DOCD: CPT | Performed by: NURSE PRACTITIONER

## 2023-08-22 RX ORDER — METOPROLOL SUCCINATE 50 MG/1
50 TABLET, EXTENDED RELEASE ORAL
Qty: 90 TABLET | Refills: 3 | Status: SHIPPED | OUTPATIENT
Start: 2023-08-22

## 2023-08-22 RX ORDER — INSULIN GLARGINE 300 U/ML
30 INJECTION, SOLUTION SUBCUTANEOUS 2 TIMES DAILY
Qty: 6 ML | Refills: 5 | Status: SHIPPED | OUTPATIENT
Start: 2023-08-22

## 2023-08-22 RX ORDER — TAMSULOSIN HYDROCHLORIDE 0.4 MG/1
CAPSULE ORAL
COMMUNITY
Start: 2023-07-22

## 2023-08-22 RX ORDER — IBUPROFEN 600 MG/1
600 TABLET ORAL EVERY 6 HOURS PRN
COMMUNITY
Start: 2023-07-19

## 2023-08-22 RX ORDER — LISINOPRIL 40 MG/1
40 TABLET ORAL DAILY
Qty: 90 TABLET | Refills: 3 | Status: SHIPPED | OUTPATIENT
Start: 2023-08-22

## 2023-08-22 RX ORDER — AMLODIPINE BESYLATE 10 MG/1
10 TABLET ORAL DAILY
Qty: 90 TABLET | Refills: 3 | Status: SHIPPED | OUTPATIENT
Start: 2023-08-22

## 2023-08-22 RX ORDER — SITAGLIPTIN AND METFORMIN HYDROCHLORIDE 1000; 100 MG/1; MG/1
1 TABLET, FILM COATED, EXTENDED RELEASE ORAL DAILY
Qty: 90 TABLET | Refills: 1 | Status: SHIPPED | OUTPATIENT
Start: 2023-08-22

## 2023-08-22 RX ORDER — CLONIDINE 0.2 MG/24H
1 PATCH, EXTENDED RELEASE TRANSDERMAL WEEKLY
Qty: 12 PATCH | Refills: 3 | Status: SHIPPED | OUTPATIENT
Start: 2023-08-22

## 2023-08-22 RX ORDER — ATORVASTATIN CALCIUM 20 MG/1
20 TABLET, FILM COATED ORAL DAILY
Qty: 90 TABLET | Refills: 3 | Status: SHIPPED | OUTPATIENT
Start: 2023-08-22

## 2023-08-22 NOTE — PROGRESS NOTES
Subjective:   Jenifer Ledesma is a 77 y.o. male  established here with complaints of Follow-up and Hypertension  Patient presenting for hypertension and hyperlipidemia followup. Have been taking all his meds as directed but has not checked his blood pressure at home. Patient reports blood pressures at home most frequently not checked . Patient reports none Patient denies chest pain, shortness of breath, weakness, orthostatic hypotension, cough, myalgias, rash, headaches, weight gain, leg swelling, palpitations, slow heart rate, fatigue, and depression. Patient reports  good compliance with medications and no side effects from medications noted. Current treatments include diet modification, weight loss. Patient presenting for Diabetes follow up. Patient reports none. Patient denies polyuria, polydipsia, polyphagia, chest pain, shortness of breath, hypoglycemic episodes, weight gain/loss, peripheral extremity tingling/pain, vision changes, and medication side effects. Patient reports  good compliance with medications, no side effects from medications noted, and good compliance with diabetic diet. Current treatments include diabetic diet. Patient reports blood glucose most frequently not checked on home checks.      Patient Active Problem List   Diagnosis    Erectile dysfunction    Glycosuria    Benign essential HTN    Prostate cancer ([address])    Hyperlipidemia    Hypertension    Uncontrolled type 2 diabetes mellitus with hyperglycemia ([address])    Mixed hyperlipidemia    Nocturia    Tobacco dependence    DKA (diabetic ketoacidosis) ([address])    Chronic renal disease, stage III ([address]) [515729]    Type 2 diabetes mellitus with chronic kidney disease, with long-term current use of insulin ([facility])      Current Outpatient Medications   Medication Sig Dispense Refill    tamsulosin (FLOMAX) 0.4 MG capsule TAKE 1 CAPSULE BY MOUTH DAILY FOR ENLARGED PROSTATE      ibuprofen (ADVIL;MOTRIN) 600 MG tablet Take 1 tablet by mouth every 6

## 2023-08-22 NOTE — PATIENT INSTRUCTIONS
Metoprolol 50 mg at bedtime for blood pressure management    Get blood braw at your earliest convenint

## 2023-09-07 LAB
ALBUMIN SERPL-MCNC: 4.2 G/DL (ref 3.9–4.9)
ALBUMIN/GLOB SERPL: 1.6 {RATIO} (ref 1.2–2.2)
ALP SERPL-CCNC: 68 IU/L (ref 44–121)
ALT SERPL-CCNC: 66 IU/L (ref 0–44)
AST SERPL-CCNC: 46 IU/L (ref 0–40)
BASOPHILS # BLD AUTO: 0.1 X10E3/UL (ref 0–0.2)
BASOPHILS NFR BLD AUTO: 1 %
BILIRUB SERPL-MCNC: 0.3 MG/DL (ref 0–1.2)
BUN SERPL-MCNC: 19 MG/DL (ref 8–27)
BUN/CREAT SERPL: 17 (ref 10–24)
CALCIUM SERPL-MCNC: 9.8 MG/DL (ref 8.6–10.2)
CHLORIDE SERPL-SCNC: 100 MMOL/L (ref 96–106)
CHOLEST SERPL-MCNC: 147 MG/DL (ref 100–199)
CO2 SERPL-SCNC: 23 MMOL/L (ref 20–29)
CREAT SERPL-MCNC: 1.11 MG/DL (ref 0.76–1.27)
EGFRCR SERPLBLD CKD-EPI 2021: 71 ML/MIN/1.73
EOSINOPHIL # BLD AUTO: 0.4 X10E3/UL (ref 0–0.4)
EOSINOPHIL NFR BLD AUTO: 4 %
ERYTHROCYTE [DISTWIDTH] IN BLOOD BY AUTOMATED COUNT: 13.8 % (ref 11.6–15.4)
GLOBULIN SER CALC-MCNC: 2.6 G/DL (ref 1.5–4.5)
GLUCOSE SERPL-MCNC: 117 MG/DL (ref 70–99)
HBA1C MFR BLD: 7.3 % (ref 4.8–5.6)
HCT VFR BLD AUTO: 42.5 % (ref 37.5–51)
HCV IGG SERPL QL IA: NON REACTIVE
HDLC SERPL-MCNC: 35 MG/DL
HGB BLD-MCNC: 14.2 G/DL (ref 13–17.7)
IMM GRANULOCYTES # BLD AUTO: 0.1 X10E3/UL (ref 0–0.1)
IMM GRANULOCYTES NFR BLD AUTO: 1 %
LDLC SERPL CALC-MCNC: 67 MG/DL (ref 0–99)
LYMPHOCYTES # BLD AUTO: 4.4 X10E3/UL (ref 0.7–3.1)
LYMPHOCYTES NFR BLD AUTO: 42 %
MCH RBC QN AUTO: 29 PG (ref 26.6–33)
MCHC RBC AUTO-ENTMCNC: 33.4 G/DL (ref 31.5–35.7)
MCV RBC AUTO: 87 FL (ref 79–97)
MONOCYTES # BLD AUTO: 1 X10E3/UL (ref 0.1–0.9)
MONOCYTES NFR BLD AUTO: 10 %
NEUTROPHILS # BLD AUTO: 4.4 X10E3/UL (ref 1.4–7)
NEUTROPHILS NFR BLD AUTO: 42 %
PLATELET # BLD AUTO: 270 X10E3/UL (ref 150–450)
POTASSIUM SERPL-SCNC: 4.8 MMOL/L (ref 3.5–5.2)
PROT SERPL-MCNC: 6.8 G/DL (ref 6–8.5)
RBC # BLD AUTO: 4.89 X10E6/UL (ref 4.14–5.8)
SODIUM SERPL-SCNC: 139 MMOL/L (ref 134–144)
TRIGL SERPL-MCNC: 282 MG/DL (ref 0–149)
VLDLC SERPL CALC-MCNC: 45 MG/DL (ref 5–40)
WBC # BLD AUTO: 10.5 X10E3/UL (ref 3.4–10.8)

## 2023-09-08 LAB
ALBUMIN/CREAT UR: 4 MG/G CREAT (ref 0–29)
CREAT UR-MCNC: 142.5 MG/DL
MICROALBUMIN UR-MCNC: 6 UG/ML

## 2023-09-25 NOTE — TELEPHONE ENCOUNTER
Requested Prescriptions     Pending Prescriptions Disp Refills    Continuous Blood Gluc Sensor (FREESTYLE STEVEN 2 SENSOR) MISC [Pharmacy Med Name: FREESTYLE STEVEN 2 SENSOR] 6 each      Sig: USE 1 EACH DAILY EVERY 14 DAYS FOR GLUCOSE TESTING

## 2023-10-02 ENCOUNTER — TELEPHONE (OUTPATIENT)
Facility: CLINIC | Age: 68
End: 2023-10-02

## 2023-10-02 DIAGNOSIS — Z12.2 ENCOUNTER FOR SCREENING FOR LUNG CANCER: Primary | ICD-10-CM

## 2023-10-02 NOTE — TELEPHONE ENCOUNTER
----- Message from Kaycee Khoury sent at 10/2/2023 10:15 AM EDT -----  Subject: Referral Request    Reason for referral request? pt needs lung cancer screening, call pt back   when it ready  Provider patient wants to be referred to(if known):     Provider Phone Number(if known):     Additional Information for Provider?   ---------------------------------------------------------------------------  --------------  Elizabeth Marine Marguerite    [phone]; OK to leave message on voicemail  ---------------------------------------------------------------------------  --------------

## 2023-10-10 NOTE — TELEPHONE ENCOUNTER
Requested Prescriptions     Pending Prescriptions Disp Refills    ibuprofen (ADVIL;MOTRIN) 600 MG tablet [Pharmacy Med Name: IBUPROFEN 600MG TABLETS] 90 tablet      Sig: TAKE 1 TABLET BY MOUTH EVERY 6 HOURS AS NEEDED FOR PAIN

## 2023-10-13 RX ORDER — IBUPROFEN 600 MG/1
600 TABLET ORAL EVERY 6 HOURS PRN
Qty: 90 TABLET | Refills: 1 | Status: SHIPPED | OUTPATIENT
Start: 2023-10-13

## 2023-10-20 ENCOUNTER — OFFICE VISIT (OUTPATIENT)
Facility: CLINIC | Age: 68
End: 2023-10-20

## 2023-10-20 VITALS
DIASTOLIC BLOOD PRESSURE: 78 MMHG | WEIGHT: 171.4 LBS | TEMPERATURE: 98.2 F | HEART RATE: 64 BPM | OXYGEN SATURATION: 97 % | BODY MASS INDEX: 25.39 KG/M2 | HEIGHT: 69 IN | SYSTOLIC BLOOD PRESSURE: 176 MMHG

## 2023-10-20 DIAGNOSIS — I10 ESSENTIAL (PRIMARY) HYPERTENSION: ICD-10-CM

## 2023-10-20 DIAGNOSIS — Z87.891 PERSONAL HISTORY OF TOBACCO USE: ICD-10-CM

## 2023-10-20 DIAGNOSIS — N18.31 STAGE 3A CHRONIC KIDNEY DISEASE (HCC): ICD-10-CM

## 2023-10-20 DIAGNOSIS — E11.22 TYPE 2 DIABETES MELLITUS WITH CHRONIC KIDNEY DISEASE, WITH LONG-TERM CURRENT USE OF INSULIN, UNSPECIFIED CKD STAGE (HCC): ICD-10-CM

## 2023-10-20 DIAGNOSIS — Z79.4 TYPE 2 DIABETES MELLITUS WITH CHRONIC KIDNEY DISEASE, WITH LONG-TERM CURRENT USE OF INSULIN, UNSPECIFIED CKD STAGE (HCC): ICD-10-CM

## 2023-10-20 DIAGNOSIS — E11.65 UNCONTROLLED TYPE 2 DIABETES MELLITUS WITH HYPERGLYCEMIA (HCC): ICD-10-CM

## 2023-10-20 DIAGNOSIS — Z00.00 MEDICARE ANNUAL WELLNESS VISIT, SUBSEQUENT: Primary | ICD-10-CM

## 2023-10-20 RX ORDER — AMLODIPINE BESYLATE 10 MG/1
10 TABLET ORAL DAILY
Qty: 90 TABLET | Refills: 3 | Status: SHIPPED | OUTPATIENT
Start: 2023-10-20

## 2023-10-20 RX ORDER — TAMSULOSIN HYDROCHLORIDE 0.4 MG/1
CAPSULE ORAL
Qty: 90 CAPSULE | Refills: 1 | Status: SHIPPED | OUTPATIENT
Start: 2023-10-20

## 2023-10-20 RX ORDER — INSULIN GLARGINE 300 U/ML
30 INJECTION, SOLUTION SUBCUTANEOUS 2 TIMES DAILY
Qty: 6 ML | Refills: 5 | Status: SHIPPED | OUTPATIENT
Start: 2023-10-20

## 2023-10-20 SDOH — ECONOMIC STABILITY: FOOD INSECURITY: WITHIN THE PAST 12 MONTHS, THE FOOD YOU BOUGHT JUST DIDN'T LAST AND YOU DIDN'T HAVE MONEY TO GET MORE.: NEVER TRUE

## 2023-10-20 SDOH — ECONOMIC STABILITY: INCOME INSECURITY: HOW HARD IS IT FOR YOU TO PAY FOR THE VERY BASICS LIKE FOOD, HOUSING, MEDICAL CARE, AND HEATING?: NOT HARD AT ALL

## 2023-10-20 SDOH — ECONOMIC STABILITY: FOOD INSECURITY: WITHIN THE PAST 12 MONTHS, YOU WORRIED THAT YOUR FOOD WOULD RUN OUT BEFORE YOU GOT MONEY TO BUY MORE.: NEVER TRUE

## 2023-10-20 ASSESSMENT — ANXIETY QUESTIONNAIRES
3. WORRYING TOO MUCH ABOUT DIFFERENT THINGS: 0
4. TROUBLE RELAXING: 0
2. NOT BEING ABLE TO STOP OR CONTROL WORRYING: 0
GAD7 TOTAL SCORE: 0
5. BEING SO RESTLESS THAT IT IS HARD TO SIT STILL: 0
6. BECOMING EASILY ANNOYED OR IRRITABLE: 0
1. FEELING NERVOUS, ANXIOUS, OR ON EDGE: 0
7. FEELING AFRAID AS IF SOMETHING AWFUL MIGHT HAPPEN: 0

## 2023-10-20 ASSESSMENT — PATIENT HEALTH QUESTIONNAIRE - PHQ9
SUM OF ALL RESPONSES TO PHQ QUESTIONS 1-9: 0
SUM OF ALL RESPONSES TO PHQ9 QUESTIONS 1 & 2: 0
1. LITTLE INTEREST OR PLEASURE IN DOING THINGS: 0
SUM OF ALL RESPONSES TO PHQ QUESTIONS 1-9: 0
2. FEELING DOWN, DEPRESSED OR HOPELESS: 0
SUM OF ALL RESPONSES TO PHQ QUESTIONS 1-9: 0
SUM OF ALL RESPONSES TO PHQ QUESTIONS 1-9: 0

## 2023-10-20 ASSESSMENT — LIFESTYLE VARIABLES
HOW OFTEN DO YOU HAVE A DRINK CONTAINING ALCOHOL: NEVER
HOW MANY STANDARD DRINKS CONTAINING ALCOHOL DO YOU HAVE ON A TYPICAL DAY: PATIENT DOES NOT DRINK

## 2023-10-20 NOTE — PROGRESS NOTES
Medicare Annual Wellness Visit    Beny Sarmiento is here for Medicare AWV    Assessment & Plan   Medicare annual wellness visit, subsequent  Personal history of tobacco use  -     [place] VISIT TO DISCUSS LUNG CA SCREEN W LDCT  Essential (primary) hypertension  -     amLODIPine (NORVASC) 10 MG tablet; Take 1 tablet by mouth daily, Disp-90 tablet, R-3Normal  -     Microalbumin / Creatinine Urine Ratio  -     Hemoglobin A1C  -     Lipid Panel  -     Comprehensive Metabolic Panel  -     CBC with Auto Differential  Stage 3a chronic kidney disease (HCC)  -     insulin glargine, 1 unit dial, (TOUJEO SOLOSTAR) 300 UNIT/ML concentrated injection pen; Inject 30 Units into the skin 2 times daily, Disp-6 mL, R-5Normal  Type 2 diabetes mellitus with chronic kidney disease, with long-term current use of insulin, unspecified CKD stage (HCC)  -     insulin glargine, 1 unit dial, (TOUJEO SOLOSTAR) 300 UNIT/ML concentrated injection pen; Inject 30 Units into the skin 2 times daily, Disp-6 mL, R-5Normal  -     Microalbumin / Creatinine Urine Ratio  -     Hemoglobin A1C  -     Lipid Panel  -     Comprehensive Metabolic Panel  -     CBC with Auto Differential  Uncontrolled type 2 diabetes mellitus with hyperglycemia (HCC)  Hypertension poorly controlled, changes needed, continue current medications and medication changes increase amlodipine 10 mg , will recheck at follow-up visit. Hyperlipidemia stable, no changes needed, continue current medications, we will recheck at follow-up visit. Diabetes control improving, changes needed, continue current medications and medication changes increase toujeo 30 units BID , we will recheck at follow-up visit.     Issues discussed include maintaining a diet, weight control and exercise, continued home blood pressure monitoring, maintaining medication compliance, medication side effects, refraining from tobacco use,  long term hypertension complications, long term hyperlipidemia complications, and

## 2023-10-20 NOTE — TELEPHONE ENCOUNTER
Requested Prescriptions     Pending Prescriptions Disp Refills    tamsulosin (FLOMAX) 0.4 MG capsule [Pharmacy Med Name: TAMSULOSIN 0.4MG CAPSULES] 90 capsule      Sig: TAKE 1 CAPSULE BY MOUTH DAILY FOR ENLARGED PROSTATE

## 2023-10-28 ENCOUNTER — HOSPITAL ENCOUNTER (OUTPATIENT)
Facility: HOSPITAL | Age: 68
Discharge: HOME | End: 2023-10-31

## 2023-10-28 DIAGNOSIS — Z12.2 ENCOUNTER FOR SCREENING FOR LUNG CANCER: ICD-10-CM

## 2023-11-03 ENCOUNTER — HOSPITAL ENCOUNTER (OUTPATIENT)
Facility: HOSPITAL | Age: 68
End: 2023-11-03
Payer: MEDICARE

## 2023-11-03 PROCEDURE — 71250 CT THORAX DX C-: CPT

## 2023-11-03 NOTE — RESULT ENCOUNTER NOTE
The results from your recent test are normal. Follow up as previously scheduled or sooner as needed.

## 2024-01-15 ENCOUNTER — TELEPHONE (OUTPATIENT)
Facility: CLINIC | Age: 69
End: 2024-01-15

## 2024-01-15 NOTE — TELEPHONE ENCOUNTER
Requested Prescriptions     Pending Prescriptions Disp Refills    Continuous Blood Gluc Sensor (FREESTYLE STEVEN 2 SENSOR) MISC 6 each 1    Continuous Blood Gluc Sensor (FREESTYLE STEVEN 2 SENSOR) MISC 6 each 1      This is the reader right it comes as a package patient needs the reader that comes with the sensor

## 2024-01-15 NOTE — TELEPHONE ENCOUNTER
Patient friend Lavonne Meyery called and stated the patient has a sensor in his arm but lost the meter and needs to have the meter ordered.  She spoke to pharmacy and they told her to call office.  Send to [facility].

## 2024-01-16 NOTE — TELEPHONE ENCOUNTER
Requested Prescriptions     Pending Prescriptions Disp Refills    Continuous Blood Gluc Sensor (FREESTYLE STEVEN 2 SENSOR) MISC [Pharmacy Med Name: FREESTYLE STEVEN 2 SENSOR] 6 each 1     Sig: USE AS DIRECTED EVERY 14 DAYS

## 2024-01-17 NOTE — TELEPHONE ENCOUNTER
Requested Prescriptions     Pending Prescriptions Disp Refills    Continuous Blood Gluc  (FREESTYLE STEVEN 2 READER) MICHEAL       Sig: by Does not apply route     Signed Prescriptions Disp Refills    Continuous Blood Gluc Sensor (FREESTYLE STEVEN 2 SENSOR) MISC 6 each 1     Sig: USE AS DIRECTED EVERY 14 DAYS     Authorizing Provider: MIREILLE CAMPBELL          Patient has lost the reader and is asking for a replacement

## 2024-01-17 NOTE — TELEPHONE ENCOUNTER
Lavonne caretaker of this patient called regarding patient.  Patient did get the sensors but needs the meter that reads the sensor.  Patient lost the meter.  Lavonne can be reached at [phone]

## 2024-01-18 DIAGNOSIS — E11.22 TYPE 2 DIABETES MELLITUS WITH CHRONIC KIDNEY DISEASE, WITH LONG-TERM CURRENT USE OF INSULIN, UNSPECIFIED CKD STAGE (HCC): ICD-10-CM

## 2024-01-18 DIAGNOSIS — Z79.4 TYPE 2 DIABETES MELLITUS WITH CHRONIC KIDNEY DISEASE, WITH LONG-TERM CURRENT USE OF INSULIN, UNSPECIFIED CKD STAGE (HCC): ICD-10-CM

## 2024-01-18 NOTE — TELEPHONE ENCOUNTER
Requested Prescriptions     Pending Prescriptions Disp Refills    JANUMET -1000 MG TB24 [Pharmacy Med Name: JANUMET XR 100MG/1000MG TABLETS] 90 tablet 1     Sig: TAKE 1 TABLET BY MOUTH DAILY

## 2024-01-19 RX ORDER — SITAGLIPTIN AND METFORMIN HYDROCHLORIDE 1000; 100 MG/1; MG/1
1 TABLET, FILM COATED, EXTENDED RELEASE ORAL DAILY
Qty: 90 TABLET | Refills: 1 | Status: SHIPPED | OUTPATIENT
Start: 2024-01-19

## 2024-01-25 DIAGNOSIS — I10 ESSENTIAL (PRIMARY) HYPERTENSION: ICD-10-CM

## 2024-01-25 NOTE — TELEPHONE ENCOUNTER
Requested Prescriptions     Pending Prescriptions Disp Refills    lisinopril (PRINIVIL;ZESTRIL) 40 MG tablet [Pharmacy Med Name: LISINOPRIL 40MG TABLETS] 90 tablet 3     Sig: TAKE 1 TABLET BY MOUTH DAILY

## 2024-01-26 RX ORDER — LISINOPRIL 40 MG/1
40 TABLET ORAL DAILY
Qty: 90 TABLET | Refills: 3 | Status: SHIPPED | OUTPATIENT
Start: 2024-01-26

## 2024-02-06 ENCOUNTER — OFFICE VISIT (OUTPATIENT)
Facility: CLINIC | Age: 69
End: 2024-02-06
Payer: MEDICARE

## 2024-02-06 VITALS
OXYGEN SATURATION: 98 % | HEART RATE: 72 BPM | BODY MASS INDEX: 24.59 KG/M2 | HEIGHT: 69 IN | WEIGHT: 166 LBS | DIASTOLIC BLOOD PRESSURE: 67 MMHG | SYSTOLIC BLOOD PRESSURE: 120 MMHG | TEMPERATURE: 97.3 F

## 2024-02-06 DIAGNOSIS — I10 HYPERTENSION, UNSPECIFIED TYPE: ICD-10-CM

## 2024-02-06 DIAGNOSIS — N18.31 STAGE 3A CHRONIC KIDNEY DISEASE (HCC): ICD-10-CM

## 2024-02-06 DIAGNOSIS — E11.65 UNCONTROLLED TYPE 2 DIABETES MELLITUS WITH HYPERGLYCEMIA (HCC): ICD-10-CM

## 2024-02-06 DIAGNOSIS — E11.22 TYPE 2 DIABETES MELLITUS WITH CHRONIC KIDNEY DISEASE, WITH LONG-TERM CURRENT USE OF INSULIN, UNSPECIFIED CKD STAGE (HCC): ICD-10-CM

## 2024-02-06 DIAGNOSIS — I73.9 PVD (PERIPHERAL VASCULAR DISEASE) (HCC): ICD-10-CM

## 2024-02-06 DIAGNOSIS — Z00.00 MEDICARE ANNUAL WELLNESS VISIT, SUBSEQUENT: Primary | ICD-10-CM

## 2024-02-06 DIAGNOSIS — Z79.4 TYPE 2 DIABETES MELLITUS WITH CHRONIC KIDNEY DISEASE, WITH LONG-TERM CURRENT USE OF INSULIN, UNSPECIFIED CKD STAGE (HCC): ICD-10-CM

## 2024-02-06 DIAGNOSIS — R63.0 ANOREXIA: ICD-10-CM

## 2024-02-06 DIAGNOSIS — Z87.891 PERSONAL HISTORY OF TOBACCO USE: ICD-10-CM

## 2024-02-06 DIAGNOSIS — E78.5 HYPERLIPIDEMIA, UNSPECIFIED HYPERLIPIDEMIA TYPE: ICD-10-CM

## 2024-02-06 LAB — HBA1C MFR BLD: 8.9 %

## 2024-02-06 PROCEDURE — G0439 PPPS, SUBSEQ VISIT: HCPCS | Performed by: NURSE PRACTITIONER

## 2024-02-06 PROCEDURE — 3074F SYST BP LT 130 MM HG: CPT | Performed by: NURSE PRACTITIONER

## 2024-02-06 PROCEDURE — 1123F ACP DISCUSS/DSCN MKR DOCD: CPT | Performed by: NURSE PRACTITIONER

## 2024-02-06 PROCEDURE — G0296 VISIT TO DETERM LDCT ELIG: HCPCS | Performed by: NURSE PRACTITIONER

## 2024-02-06 PROCEDURE — 83036 HEMOGLOBIN GLYCOSYLATED A1C: CPT | Performed by: NURSE PRACTITIONER

## 2024-02-06 PROCEDURE — 3078F DIAST BP <80 MM HG: CPT | Performed by: NURSE PRACTITIONER

## 2024-02-06 PROCEDURE — 99213 OFFICE O/P EST LOW 20 MIN: CPT | Performed by: NURSE PRACTITIONER

## 2024-02-06 RX ORDER — DULAGLUTIDE 0.75 MG/.5ML
0.75 INJECTION, SOLUTION SUBCUTANEOUS WEEKLY
Qty: 2 ML | Refills: 5 | Status: SHIPPED | OUTPATIENT
Start: 2024-02-06

## 2024-02-06 RX ORDER — MEGESTROL ACETATE 20 MG/1
20 TABLET ORAL 4 TIMES DAILY
Qty: 360 TABLET | Refills: 1 | Status: SHIPPED | OUTPATIENT
Start: 2024-02-06 | End: 2024-05-06

## 2024-02-06 ASSESSMENT — PATIENT HEALTH QUESTIONNAIRE - PHQ9
SUM OF ALL RESPONSES TO PHQ QUESTIONS 1-9: 0
2. FEELING DOWN, DEPRESSED OR HOPELESS: 0
SUM OF ALL RESPONSES TO PHQ9 QUESTIONS 1 & 2: 0
1. LITTLE INTEREST OR PLEASURE IN DOING THINGS: 0
SUM OF ALL RESPONSES TO PHQ QUESTIONS 1-9: 0

## 2024-02-06 ASSESSMENT — LIFESTYLE VARIABLES
HOW MANY STANDARD DRINKS CONTAINING ALCOHOL DO YOU HAVE ON A TYPICAL DAY: PATIENT DOES NOT DRINK
HOW OFTEN DO YOU HAVE A DRINK CONTAINING ALCOHOL: NEVER

## 2024-02-06 NOTE — PROGRESS NOTES
Chief Complaint   Patient presents with    Medicare AWV     \"Have you been to the ER, urgent care clinic since your last visit?  Hospitalized since your last visit?\"    NO    “Have you seen or consulted any other health care providers outside of [facility] since your last visit?”    NO  /67 (Site: Left Upper Arm, Position: Sitting, Cuff Size: Large Adult)   Pulse 72   Temp 97.3 °F (36.3 °C) (Temporal)   Ht 1.753 m (5' 9\")   Wt 75.3 kg (166 lb)   SpO2 98%   BMI 24.51 kg/m²              
providers/suppliers regularly involved in providing care):   Patient Care Team:  Vanita Arroyo APRN - CNP as PCP - General  Vanita Arroyo APRN - CNP as PCP - Empaneled Provider     Reviewed and updated this visit:  Tobacco  Allergies  Meds  Problems  Med Hx  Surg Hx  Soc Hx  Fam Hx      Aspects of this note may have been generated using voice recognition software. Despite editing, there may be some syntax errors.    INDY Dunham CNP

## 2024-02-06 NOTE — PATIENT INSTRUCTIONS
change.  If you tend to feel dizzy after you take medicine, avoid activity at that time. Try being active before you take your medicine. This will reduce your risk of falls.  If you plan to be active at home, make sure to clear your space before you get started. Remove things like TV cords, coffee tables, and throw rugs. It's safest to have plenty of space to move freely.  The key to getting more active is to take it slow and steady. Try to improve only a little bit at a time. Pick just one area to improve on at first. And if an activity hurts, stop and talk to your doctor.  Where can you learn more?  Go to https://www.[employer].net/patientEd and enter P600 to learn more about \"Learning About Being Active as an Older Adult.\"  Current as of: June 6, 2023               Content Version: 13.9  © 7992-2408 [employer].   Care instructions adapted under license by [employer]. If you have questions about a medical condition or this instruction, always ask your healthcare professional. [employer] disclaims any warranty or liability for your use of this information.           Learning About Dental Care for Older Adults  Dental care for older adults: Overview  Dental care for older people is much the same as for younger adults. But older adults do have concerns that younger adults do not. Older adults may have problems with gum disease and decay on the roots of their teeth. They may need missing teeth replaced or broken fillings fixed. Or they may have dentures that need to be cared for. Some older adults may have trouble holding a toothbrush.  You can help remind the person you are caring for to brush and floss their teeth or to clean their dentures. In some cases, you may need to do the brushing and other dental care tasks. People who have trouble using their hands or who have dementia may need this extra help.  How can you help with dental care?  Normal dental care  To keep the teeth and gums

## 2024-02-23 RX ORDER — IBUPROFEN 600 MG/1
600 TABLET ORAL EVERY 6 HOURS PRN
Qty: 90 TABLET | Refills: 1 | Status: SHIPPED | OUTPATIENT
Start: 2024-02-23

## 2024-03-18 DIAGNOSIS — E78.2 MIXED HYPERLIPIDEMIA: ICD-10-CM

## 2024-03-18 RX ORDER — METOPROLOL SUCCINATE 25 MG/1
25 TABLET, EXTENDED RELEASE ORAL DAILY
Qty: 90 TABLET | Refills: 1 | Status: SHIPPED | OUTPATIENT
Start: 2024-03-18

## 2024-03-18 RX ORDER — ATORVASTATIN CALCIUM 20 MG/1
20 TABLET, FILM COATED ORAL DAILY
Qty: 90 TABLET | Refills: 3 | Status: SHIPPED | OUTPATIENT
Start: 2024-03-18

## 2024-03-18 NOTE — TELEPHONE ENCOUNTER
Requested Prescriptions     Pending Prescriptions Disp Refills    metoprolol succinate (TOPROL XL) 25 MG extended release tablet [Pharmacy Med Name: METOPROLOL ER SUCCINATE 25MG TABS] 90 tablet      Sig: TAKE 1 TABLET BY MOUTH DAILY    atorvastatin (LIPITOR) 20 MG tablet [Pharmacy Med Name: ATORVASTATIN 20MG TABLETS] 90 tablet 3     Sig: TAKE 1 TABLET BY MOUTH DAILY

## 2024-03-28 ENCOUNTER — TELEPHONE (OUTPATIENT)
Facility: CLINIC | Age: 69
End: 2024-03-28

## 2024-03-28 NOTE — TELEPHONE ENCOUNTER
Patients girlfriend stated the patient has been dizzy for a couple days and bp 106/68 bp normally in 170/70 and his balance is off no fever body aches or chills but feels cold blood sugars are 168 patient had only a cup of coffee today and has no want to eat please advise.

## 2024-03-28 NOTE — TELEPHONE ENCOUNTER
I spoke with the patient and advised of COY Bennett recommendation of going to the ER. Patient stated that he would and that he will go to [facility].

## 2024-03-28 NOTE — TELEPHONE ENCOUNTER
Patients Girlfriend Lavonne called pt has been lightheaded for a couple of days and his sugar is around 186.  Patient would like a call at [phone].  Please advise

## 2024-04-17 RX ORDER — TAMSULOSIN HYDROCHLORIDE 0.4 MG/1
CAPSULE ORAL
Qty: 90 CAPSULE | Refills: 1 | Status: SHIPPED | OUTPATIENT
Start: 2024-04-17

## 2024-04-17 NOTE — TELEPHONE ENCOUNTER
Requested Prescriptions     Pending Prescriptions Disp Refills    tamsulosin (FLOMAX) 0.4 MG capsule [Pharmacy Med Name: TAMSULOSIN 0.4MG CAPSULES] 90 capsule 1     Sig: TAKE 1 CAPSULE BY MOUTH DAILY FOR ENLARGED PROSTATE

## 2024-05-14 ENCOUNTER — OFFICE VISIT (OUTPATIENT)
Facility: CLINIC | Age: 69
End: 2024-05-14
Payer: MEDICARE

## 2024-05-14 VITALS
BODY MASS INDEX: 22.81 KG/M2 | WEIGHT: 154 LBS | HEIGHT: 69 IN | HEART RATE: 75 BPM | TEMPERATURE: 95.3 F | DIASTOLIC BLOOD PRESSURE: 69 MMHG | SYSTOLIC BLOOD PRESSURE: 103 MMHG | RESPIRATION RATE: 18 BRPM

## 2024-05-14 DIAGNOSIS — E11.22 TYPE 2 DIABETES MELLITUS WITH CHRONIC KIDNEY DISEASE, WITH LONG-TERM CURRENT USE OF INSULIN, UNSPECIFIED CKD STAGE (HCC): Primary | ICD-10-CM

## 2024-05-14 DIAGNOSIS — E11.65 UNCONTROLLED TYPE 2 DIABETES MELLITUS WITH HYPERGLYCEMIA (HCC): ICD-10-CM

## 2024-05-14 DIAGNOSIS — I10 HYPERTENSION, UNSPECIFIED TYPE: ICD-10-CM

## 2024-05-14 DIAGNOSIS — Z79.4 TYPE 2 DIABETES MELLITUS WITH CHRONIC KIDNEY DISEASE, WITH LONG-TERM CURRENT USE OF INSULIN, UNSPECIFIED CKD STAGE (HCC): Primary | ICD-10-CM

## 2024-05-14 DIAGNOSIS — E78.5 HYPERLIPIDEMIA, UNSPECIFIED HYPERLIPIDEMIA TYPE: ICD-10-CM

## 2024-05-14 LAB — HBA1C MFR BLD: 7.7 %

## 2024-05-14 PROCEDURE — 1123F ACP DISCUSS/DSCN MKR DOCD: CPT | Performed by: NURSE PRACTITIONER

## 2024-05-14 PROCEDURE — 3074F SYST BP LT 130 MM HG: CPT | Performed by: NURSE PRACTITIONER

## 2024-05-14 PROCEDURE — 99214 OFFICE O/P EST MOD 30 MIN: CPT | Performed by: NURSE PRACTITIONER

## 2024-05-14 PROCEDURE — 3078F DIAST BP <80 MM HG: CPT | Performed by: NURSE PRACTITIONER

## 2024-05-14 PROCEDURE — 83036 HEMOGLOBIN GLYCOSYLATED A1C: CPT | Performed by: NURSE PRACTITIONER

## 2024-05-14 RX ORDER — DULAGLUTIDE 1.5 MG/.5ML
1.5 INJECTION, SOLUTION SUBCUTANEOUS WEEKLY
Qty: 6 ML | Refills: 3 | Status: SHIPPED | OUTPATIENT
Start: 2024-05-14

## 2024-05-14 RX ORDER — CLOPIDOGREL BISULFATE 75 MG/1
75 TABLET ORAL DAILY
COMMUNITY
End: 2024-05-14 | Stop reason: DRUGHIGH

## 2024-05-14 NOTE — PROGRESS NOTES
[facility]  [address] [place] [address] [address]   [place] [address]  (P) [phone]   (F) [phone]    Subjective:   Bang Denny is a 68 y.o. male  established here with complaints of Follow-up and Diabetes  Patient presenting for hypertension, hyperlipidemia, and diabetes followup.  Reports have been taking his meds.  Did quit drinking cold turkey and doing well.  Not trying to quit smoking currently smoking half a pack per day.  Still having issues with low appetite not eating much.  Patient reports blood pressures at home most frequently not checked . Patient reports no new symptoms Patient denies chest pain, shortness of breath, weakness, and orthostatic hypotension. Patient reports  good compliance with medications, no side effects from medications noted, and good compliance with diabetic diet. Current treatments include diet modification, weight loss.     Patient Active Problem List   Diagnosis    Erectile dysfunction    Glycosuria    Benign essential HTN    Hyperlipidemia    Hypertension    Uncontrolled type 2 diabetes mellitus with hyperglycemia ([facility])    Mixed hyperlipidemia    Nocturia    Tobacco dependence    DKA (diabetic ketoacidosis) ([facility])    Chronic renal disease, stage III ([facility]) [[id number]]    Type 2 diabetes mellitus with chronic kidney disease, with long-term current use of insulin ([facility])      Current Outpatient Medications   Medication Sig Dispense Refill    dulaglutide (TRULICITY) 1.5 MG/0.5ML SC injection Inject 0.5 mLs into the skin once a week For diabetes management. If 1.5 mg pen is not available, ok to dispense 0.75 mg pens instead x 2 to receive equal dose. 6 mL 3    tamsulosin (FLOMAX) 0.4 MG capsule TAKE 1 CAPSULE BY MOUTH DAILY FOR ENLARGED PROSTATE 90 capsule 1    metoprolol succinate (TOPROL XL) 25 MG extended release tablet TAKE 1 TABLET BY MOUTH DAILY 90 tablet 1    atorvastatin (LIPITOR) 20 MG tablet TAKE 1 TABLET BY MOUTH DAILY 90 tablet 3

## 2024-05-14 NOTE — PROGRESS NOTES
\"Have you been to the ER, urgent care clinic since your last visit?  Hospitalized since your last visit?\"    NO    “Have you seen or consulted any other health care providers outside of [facility] since your last visit?”    NO    Chief Complaint   Patient presents with    Follow-up    Diabetes     .vs            Click Here for Release of Records Request

## 2024-06-06 ENCOUNTER — TELEPHONE (OUTPATIENT)
Facility: CLINIC | Age: 69
End: 2024-06-06

## 2024-06-06 NOTE — TELEPHONE ENCOUNTER
Patients girlfriend reports patient as having Low energy and dizzy spells.  Reports it has been happening for a couple days. Unsure if his BS or BP has been low.  She states his appetite has decreased.  She would like a call to discuss.

## 2024-08-20 ENCOUNTER — OFFICE VISIT (OUTPATIENT)
Facility: CLINIC | Age: 69
End: 2024-08-20
Payer: MEDICARE

## 2024-08-20 VITALS
SYSTOLIC BLOOD PRESSURE: 116 MMHG | OXYGEN SATURATION: 95 % | BODY MASS INDEX: 21.33 KG/M2 | HEART RATE: 90 BPM | WEIGHT: 144 LBS | RESPIRATION RATE: 18 BRPM | DIASTOLIC BLOOD PRESSURE: 74 MMHG | TEMPERATURE: 97.1 F | HEIGHT: 69 IN

## 2024-08-20 DIAGNOSIS — I10 HYPERTENSION, UNSPECIFIED TYPE: Primary | ICD-10-CM

## 2024-08-20 DIAGNOSIS — R35.1 NOCTURIA: ICD-10-CM

## 2024-08-20 DIAGNOSIS — E78.5 HYPERLIPIDEMIA, UNSPECIFIED HYPERLIPIDEMIA TYPE: ICD-10-CM

## 2024-08-20 DIAGNOSIS — Z23 FLU VACCINE NEED: ICD-10-CM

## 2024-08-20 DIAGNOSIS — R63.0 ANOREXIA: ICD-10-CM

## 2024-08-20 DIAGNOSIS — E11.22 TYPE 2 DIABETES MELLITUS WITH CHRONIC KIDNEY DISEASE, WITH LONG-TERM CURRENT USE OF INSULIN, UNSPECIFIED CKD STAGE (HCC): ICD-10-CM

## 2024-08-20 DIAGNOSIS — Z79.4 TYPE 2 DIABETES MELLITUS WITH CHRONIC KIDNEY DISEASE, WITH LONG-TERM CURRENT USE OF INSULIN, UNSPECIFIED CKD STAGE (HCC): ICD-10-CM

## 2024-08-20 LAB — HBA1C MFR BLD: 6.4 %

## 2024-08-20 PROCEDURE — 1123F ACP DISCUSS/DSCN MKR DOCD: CPT | Performed by: NURSE PRACTITIONER

## 2024-08-20 PROCEDURE — 99214 OFFICE O/P EST MOD 30 MIN: CPT | Performed by: NURSE PRACTITIONER

## 2024-08-20 PROCEDURE — 3078F DIAST BP <80 MM HG: CPT | Performed by: NURSE PRACTITIONER

## 2024-08-20 PROCEDURE — 83036 HEMOGLOBIN GLYCOSYLATED A1C: CPT | Performed by: NURSE PRACTITIONER

## 2024-08-20 PROCEDURE — 90653 IIV ADJUVANT VACCINE IM: CPT | Performed by: NURSE PRACTITIONER

## 2024-08-20 PROCEDURE — G0008 ADMIN INFLUENZA VIRUS VAC: HCPCS | Performed by: NURSE PRACTITIONER

## 2024-08-20 PROCEDURE — 3074F SYST BP LT 130 MM HG: CPT | Performed by: NURSE PRACTITIONER

## 2024-08-20 SDOH — ECONOMIC STABILITY: FOOD INSECURITY: WITHIN THE PAST 12 MONTHS, THE FOOD YOU BOUGHT JUST DIDN'T LAST AND YOU DIDN'T HAVE MONEY TO GET MORE.: NEVER TRUE

## 2024-08-20 SDOH — ECONOMIC STABILITY: FOOD INSECURITY: WITHIN THE PAST 12 MONTHS, YOU WORRIED THAT YOUR FOOD WOULD RUN OUT BEFORE YOU GOT MONEY TO BUY MORE.: NEVER TRUE

## 2024-08-20 SDOH — ECONOMIC STABILITY: INCOME INSECURITY: HOW HARD IS IT FOR YOU TO PAY FOR THE VERY BASICS LIKE FOOD, HOUSING, MEDICAL CARE, AND HEATING?: NOT HARD AT ALL

## 2024-08-20 ASSESSMENT — ANXIETY QUESTIONNAIRES

## 2024-08-20 ASSESSMENT — PATIENT HEALTH QUESTIONNAIRE - PHQ9
SUM OF ALL RESPONSES TO PHQ QUESTIONS 1-9: 0
SUM OF ALL RESPONSES TO PHQ9 QUESTIONS 1 & 2: 0
1. LITTLE INTEREST OR PLEASURE IN DOING THINGS: NOT AT ALL
2. FEELING DOWN, DEPRESSED OR HOPELESS: NOT AT ALL
SUM OF ALL RESPONSES TO PHQ QUESTIONS 1-9: 0

## 2024-08-20 NOTE — PROGRESS NOTES
[facility]  [address] [place] [address] [address]   [place] [address]  (P) [phone]   (F) [phone]    Subjective:   Bang Denny is a 69 y.o. male  established here with complaints of 3 Month Follow-Up   Patient presenting for hypertension, hyperlipidemia, and diabetes followup. Reports sob x 1 month with exertion about 5 steps and gets sob.  Reports more prominent while walking upstairs. Reports 5 cigarettes per day  Not eating much, stays full all the time one meal per day and no snack, breakfast, eagg, oatmeal,apple sauce, jello, toast, biscuits, apple butter. Can't chew, has not seen dentist. Occasional ensures 1 per day. Sometimes tries glucerna. Chocolate favorite.   Patient reports blood pressures at home most frequently not checked . Patient reports shortness of breath and fatigue Patient denies cough and headaches. Patient reports  good compliance with medications, no side effects from medications noted, and good compliance with diabetic diet. Current treatments include diet modification, weight loss.     Patient Active Problem List   Diagnosis    Erectile dysfunction    Glycosuria    Benign essential HTN    Hyperlipidemia    Hypertension    Uncontrolled type 2 diabetes mellitus with hyperglycemia (HCC)    Mixed hyperlipidemia    Nocturia    Tobacco dependence    DKA (diabetic ketoacidosis) ([facility])    Chronic renal disease, stage III ([facility]) [168580]    Type 2 diabetes mellitus with chronic kidney disease, with long-term current use of insulin ([facility])      Current Outpatient Medications   Medication Sig Dispense Refill    dulaglutide (TRULICITY) 1.5 MG/0.5ML SC injection Inject 0.5 mLs into the skin once a week For diabetes management. If 1.5 mg pen is not available, ok to dispense 0.75 mg pens instead x 2 to receive equal dose. 6 mL 3    tamsulosin (FLOMAX) 0.4 MG capsule TAKE 1 CAPSULE BY MOUTH DAILY FOR ENLARGED PROSTATE 90 capsule 1    metoprolol succinate (TOPROL XL) 25  brain    UROLOGICAL SURGERY      prostate bx      Family History   Problem Relation Age of Onset    Elevated Lipids Brother     Stroke Father     Hypertension Brother     Diabetes Mother     Stroke Mother     Heart Disease Mother     Heart Disease Father       Social History     Tobacco Use    Smoking status: Every Day     Current packs/day: 0.50     Average packs/day: 0.5 packs/day for 45.0 years (22.5 ttl pk-yrs)     Types: Cigarettes    Smokeless tobacco: Never   Substance Use Topics    Alcohol use: Not Currently        Review of Systems    Pertinent items are noted in HPI.     Objective:   /74 (Site: Right Upper Arm, Position: Sitting, Cuff Size: Large Adult)   Pulse 90   Temp 97.1 °F (36.2 °C) (Temporal)   Resp 18   Ht 1.753 m (5' 9\")   Wt 65.3 kg (144 lb)   SpO2 95%   BMI 21.27 kg/m²    General Appearance:    Alert, cooperative, no distress, appears stated age   Head:    Normocephalic, without obvious abnormality, atraumatic   Neck:   Supple, symmetrical, trachea midline, no adenopathy;     thyroid:  no enlargement/tenderness/nodules; no carotid    bruit or JVD   Back:     Symmetric, no curvature, ROM normal, no CVA tenderness   Lungs:     Clear to auscultation bilaterally, respirations unlabored   Chest Wall:    No tenderness or deformity    Heart:    Regular rate and rhythm, S1 and S2 normal, no murmur, rub   or gallop   Abdomen:     Soft, non-tender, bowel sounds active all four quadrants,     no masses, no organomegaly   Extremities:   Extremities normal, atraumatic, no cyanosis or edema   Pulses:   2+ and symmetric all extremities   Skin:   Skin color, texture, turgor normal, no rashes or lesions         Assessment/Plan:      Diagnosis Orders   1. Hypertension, unspecified type        2. Anorexia        3. Type 2 diabetes mellitus with chronic kidney disease, with long-term current use of insulin, unspecified CKD stage (HCC)  AMB POC HEMOGLOBIN A1C      4. Flu vaccine need  Influenza, FLUAD

## 2024-08-20 NOTE — PROGRESS NOTES
\"Have you been to the ER, urgent care clinic since your last visit?  Hospitalized since your last visit?\"    NO    “Have you seen or consulted any other health care providers outside of [facility] since your last visit?”    NO      Chief Complaint   Patient presents with    3 Month Follow-Up     /74 (Site: Right Upper Arm, Position: Sitting, Cuff Size: Large Adult)   Pulse 90   Temp 97.1 °F (36.2 °C) (Temporal)   Resp 18   Ht 1.753 m (5' 9\")   Wt 65.3 kg (144 lb)   SpO2 95%   BMI 21.27 kg/m²               Click Here for Release of Records Request

## 2024-09-05 DIAGNOSIS — I10 ESSENTIAL (PRIMARY) HYPERTENSION: ICD-10-CM

## 2024-09-06 RX ORDER — CLONIDINE 0.2 MG/24H
PATCH, EXTENDED RELEASE TRANSDERMAL
Qty: 12 PATCH | Refills: 3 | Status: SHIPPED | OUTPATIENT
Start: 2024-09-06

## 2024-09-06 NOTE — TELEPHONE ENCOUNTER
Requested Prescriptions     Pending Prescriptions Disp Refills    cloNIDine (CATAPRES) 0.2 MG/24HR PTWK [Pharmacy Med Name: CLONIDINE 0.2MG/24H WEEKLY PATCHES] 12 patch 3     Sig: APPLY 1 PATCH TOPICALLY TO THE SKIN 1 TIME A WEEK

## 2024-10-30 DIAGNOSIS — I10 ESSENTIAL (PRIMARY) HYPERTENSION: ICD-10-CM

## 2024-10-30 DIAGNOSIS — R63.0 ANOREXIA: ICD-10-CM

## 2024-10-31 NOTE — TELEPHONE ENCOUNTER
Requested Prescriptions     Pending Prescriptions Disp Refills    megestrol (MEGACE) 20 MG tablet [Pharmacy Med Name: MEGESTROL ACETATE 20MG TABLETS] 360 tablet 1     Sig: TAKE 1 TABLET BY MOUTH FOUR TIMES DAILY

## 2024-10-31 NOTE — TELEPHONE ENCOUNTER
Requested Prescriptions     Pending Prescriptions Disp Refills    amLODIPine (NORVASC) 10 MG tablet [Pharmacy Med Name: AMLODIPINE BESYLATE 10MG TABLETS] 90 tablet 3     Sig: TAKE 1 TABLET BY MOUTH DAILY

## 2024-11-01 RX ORDER — AMLODIPINE BESYLATE 10 MG/1
10 TABLET ORAL DAILY
Qty: 90 TABLET | Refills: 3 | Status: SHIPPED | OUTPATIENT
Start: 2024-11-01

## 2024-11-01 RX ORDER — MEGESTROL ACETATE 20 MG/1
20 TABLET ORAL 4 TIMES DAILY
Qty: 360 TABLET | Refills: 1 | Status: SHIPPED | OUTPATIENT
Start: 2024-11-01

## 2024-11-06 DIAGNOSIS — Z79.4 TYPE 2 DIABETES MELLITUS WITH CHRONIC KIDNEY DISEASE, WITH LONG-TERM CURRENT USE OF INSULIN, UNSPECIFIED CKD STAGE (HCC): ICD-10-CM

## 2024-11-06 DIAGNOSIS — E11.22 TYPE 2 DIABETES MELLITUS WITH CHRONIC KIDNEY DISEASE, WITH LONG-TERM CURRENT USE OF INSULIN, UNSPECIFIED CKD STAGE (HCC): ICD-10-CM

## 2024-11-13 RX ORDER — SITAGLIPTIN AND METFORMIN HYDROCHLORIDE 1000; 100 MG/1; MG/1
1 TABLET, FILM COATED, EXTENDED RELEASE ORAL DAILY
Qty: 90 TABLET | Refills: 1 | Status: SHIPPED | OUTPATIENT
Start: 2024-11-13

## 2024-12-06 ENCOUNTER — OFFICE VISIT (OUTPATIENT)
Facility: CLINIC | Age: 69
End: 2024-12-06
Payer: MEDICARE

## 2024-12-06 VITALS
WEIGHT: 137 LBS | BODY MASS INDEX: 20.29 KG/M2 | TEMPERATURE: 98 F | RESPIRATION RATE: 20 BRPM | OXYGEN SATURATION: 99 % | DIASTOLIC BLOOD PRESSURE: 58 MMHG | HEIGHT: 69 IN | HEART RATE: 110 BPM | SYSTOLIC BLOOD PRESSURE: 109 MMHG

## 2024-12-06 DIAGNOSIS — I10 HYPERTENSION, UNSPECIFIED TYPE: Primary | ICD-10-CM

## 2024-12-06 DIAGNOSIS — E11.65 UNCONTROLLED TYPE 2 DIABETES MELLITUS WITH HYPERGLYCEMIA (HCC): ICD-10-CM

## 2024-12-06 DIAGNOSIS — E78.5 HYPERLIPIDEMIA, UNSPECIFIED HYPERLIPIDEMIA TYPE: ICD-10-CM

## 2024-12-06 PROCEDURE — 1160F RVW MEDS BY RX/DR IN RCRD: CPT | Performed by: NURSE PRACTITIONER

## 2024-12-06 PROCEDURE — 3074F SYST BP LT 130 MM HG: CPT | Performed by: NURSE PRACTITIONER

## 2024-12-06 PROCEDURE — 1123F ACP DISCUSS/DSCN MKR DOCD: CPT | Performed by: NURSE PRACTITIONER

## 2024-12-06 PROCEDURE — 3078F DIAST BP <80 MM HG: CPT | Performed by: NURSE PRACTITIONER

## 2024-12-06 PROCEDURE — 99214 OFFICE O/P EST MOD 30 MIN: CPT | Performed by: NURSE PRACTITIONER

## 2024-12-06 PROCEDURE — 1159F MED LIST DOCD IN RCRD: CPT | Performed by: NURSE PRACTITIONER

## 2024-12-06 PROCEDURE — 1126F AMNT PAIN NOTED NONE PRSNT: CPT | Performed by: NURSE PRACTITIONER

## 2024-12-06 NOTE — PROGRESS NOTES
\"Have you been to the ER, urgent care clinic since your last visit?  Hospitalized since your last visit?\"    NO    “Have you seen or consulted any other health care providers outside our system since your last visit?”    NO      “Have you had a diabetic eye exam?”    Yes, Dr. Haider [facility]       Chief Complaint   Patient presents with    Anorexia     BP (!) 109/58 (Site: Right Upper Arm, Position: Sitting, Cuff Size: Medium Adult)   Pulse (!) 110   Temp 98 °F (36.7 °C) (Temporal)   Resp 20   Ht 1.753 m (5' 9\")   Wt 62.1 kg (137 lb)   SpO2 99%   BMI 20.23 kg/m²     
Patient is not to share medication and use only as indicated.   Patient verbalized understanding and agreement.       Return in about 4 months (around 4/6/2025) for unintentional weight loss, HLD, HTN, DM.     On this date 12/06/2024 I have spent 31 minutes reviewing previous notes, test results and face to face with the patient discussing the diagnosis and importance of compliance with the treatment plan as well as documenting on the day of the visit.  Aspects of this note may have been generated using voice recognition software. Despite editing, there may be some syntax errors.    Vanita Arroyo, APRN - CNP

## 2024-12-11 ENCOUNTER — TELEPHONE (OUTPATIENT)
Dept: PHARMACY | Facility: CLINIC | Age: 69
End: 2024-12-11

## 2024-12-11 NOTE — TELEPHONE ENCOUNTER
[facility]: ADHERENCE REVIEW  Identified care gap per [place] fills with [facility]: Statin adherence    Medicare Advantage - MRMA  ACO  Per insurer report, LIS-1 - may be able to receive up to a 100-day supply for the same cost as a 30-day supply.  Patient also appears to be prescribed: ACE/ARB    ASSESSMENT    ACE/ARB ADHERENCE    Insurance Records claims through  24  (Prior Year PDC = not reported; YTD PDC = 100% - PASSED ):   LISINOPRIL TAB 40 MG last filled on 10.11.24 for 90 day supply. Next refill due: 25    BP Readings from Last 3 Encounters:   24 (!) 109/58   24 116/74   24 103/69     CrCl cannot be calculated (Patient's most recent lab result is older than the maximum 180 days allowed.).  Lab Results   Component Value Date    CREATININE 1.11 2023     Lab Results   Component Value Date    K 4.8 2023     STATIN ADHERENCE    Insurance Records claims through  24  (Prior Year PDC = not reported; YTD PDC = 80%; Potential Fail Date: 12.15.24):   ATORVASTATIN TAB 20 MG last filled on 24 for 82 day supply. Next refill due: 10.18.24    Prescribed si tablet/capsule daily    Per Reconcile Dispense History and Insurer Portal: last filled on 24 for 82 day supply.     Per [facility]: Billed through [facility]. 1 refills remaining. will get  90 day supply ready to  since past due.    Lab Results   Component Value Date    CHOL 147 2023    TRIG 282 (H) 2023    HDL 35 (L) 2023     Lab Results   Component Value Date    LDL 67 2023      ALT   Date Value Ref Range Status   2023 66 (H) 0 - 44 IU/L Final     AST   Date Value Ref Range Status   2023 46 (H) 0 - 40 IU/L Final     The 10-year ASCVD risk score (Leanna KELLER, et al., 2019) is: 41.6%    Values used to calculate the score:      Age: 69 years      Sex: Male      Is Non- : Yes      Diabetic: Yes      Tobacco

## 2024-12-26 DIAGNOSIS — N18.31 STAGE 3A CHRONIC KIDNEY DISEASE (HCC): ICD-10-CM

## 2024-12-26 DIAGNOSIS — E11.22 TYPE 2 DIABETES MELLITUS WITH CHRONIC KIDNEY DISEASE, WITH LONG-TERM CURRENT USE OF INSULIN, UNSPECIFIED CKD STAGE (HCC): ICD-10-CM

## 2024-12-26 DIAGNOSIS — Z79.4 TYPE 2 DIABETES MELLITUS WITH CHRONIC KIDNEY DISEASE, WITH LONG-TERM CURRENT USE OF INSULIN, UNSPECIFIED CKD STAGE (HCC): ICD-10-CM

## 2024-12-26 RX ORDER — INSULIN GLARGINE 300 U/ML
INJECTION, SOLUTION SUBCUTANEOUS
Qty: 6 ML | Refills: 5 | Status: SHIPPED | OUTPATIENT
Start: 2024-12-26

## 2024-12-26 NOTE — TELEPHONE ENCOUNTER
Requested Prescriptions     Pending Prescriptions Disp Refills    TOUJEO SOLOSTAR 300 UNIT/ML concentrated injection pen [Pharmacy Med Name: TOUJEO SOLOSTAR 300U/ML PEN 1.5ML] 6 mL 5     Sig: ADMINISTER 30 UNITS UNDER THE SKIN TWICE DAILY

## 2025-01-07 RX ORDER — METOPROLOL SUCCINATE 25 MG/1
25 TABLET, EXTENDED RELEASE ORAL DAILY
Qty: 90 TABLET | Refills: 1 | Status: SHIPPED | OUTPATIENT
Start: 2025-01-07

## 2025-01-07 NOTE — TELEPHONE ENCOUNTER
Requested Prescriptions     Pending Prescriptions Disp Refills    metoprolol succinate (TOPROL XL) 25 MG extended release tablet [Pharmacy Med Name: METOPROLOL ER SUCCINATE 25MG TABS] 90 tablet 1     Sig: TAKE 1 TABLET BY MOUTH DAILY

## 2025-01-28 DIAGNOSIS — I10 ESSENTIAL (PRIMARY) HYPERTENSION: ICD-10-CM

## 2025-01-28 RX ORDER — LISINOPRIL 40 MG/1
40 TABLET ORAL DAILY
Qty: 90 TABLET | Refills: 3 | Status: SHIPPED | OUTPATIENT
Start: 2025-01-28

## 2025-01-28 NOTE — TELEPHONE ENCOUNTER
Requested Prescriptions     Pending Prescriptions Disp Refills    lisinopril (PRINIVIL;ZESTRIL) 40 MG tablet [Pharmacy Med Name: LISINOPRIL 40MG TABLETS] 90 tablet 3     Sig: TAKE 1 TABLET BY MOUTH DAILY          Date of last OV:12/6/24  Future OV visit scheduled:  [x] Yes -> Date: 4/8/25  [] No    Last Refill: [] N/A  Date:1/26/24  Qty:90  # of refills:3    Med pending for provider review:  [x] Yes  [] No (provide reason why):     Requested Pharmacy updated in ENC:  [x] Yes    Applicable labs (provide date of completion):  [] Diabetic med- A1c:  [] Cholesterol med- Lipids:  [] BP med- CMP or BMP:   [] Thyroid med- TSH:  [] Gout med- Uric acid:  [] Prostate med- PSA:  [] Other (provide what type of med and lab):    Additional notes:

## 2025-02-04 NOTE — TELEPHONE ENCOUNTER
Requested Prescriptions     Pending Prescriptions Disp Refills    Continuous Glucose Sensor (FREESTYLE STEVEN 2 SENSOR) MISC [Pharmacy Med Name: FREESTYLE STEVEN 2 SENSOR] 6 each 1     Sig: USE DAILY. CHANGE EVERY 14 DAYS

## 2025-03-09 DIAGNOSIS — N18.31 STAGE 3A CHRONIC KIDNEY DISEASE (HCC): ICD-10-CM

## 2025-03-09 DIAGNOSIS — Z79.4 TYPE 2 DIABETES MELLITUS WITH CHRONIC KIDNEY DISEASE, WITH LONG-TERM CURRENT USE OF INSULIN, UNSPECIFIED CKD STAGE (HCC): ICD-10-CM

## 2025-03-09 DIAGNOSIS — E11.22 TYPE 2 DIABETES MELLITUS WITH CHRONIC KIDNEY DISEASE, WITH LONG-TERM CURRENT USE OF INSULIN, UNSPECIFIED CKD STAGE (HCC): ICD-10-CM

## 2025-03-10 RX ORDER — IBUPROFEN 600 MG/1
600 TABLET, FILM COATED ORAL EVERY 6 HOURS PRN
Qty: 90 TABLET | Refills: 1 | Status: SHIPPED | OUTPATIENT
Start: 2025-03-10

## 2025-03-10 RX ORDER — FLURBIPROFEN SODIUM 0.3 MG/ML
SOLUTION/ DROPS OPHTHALMIC
Qty: 100 EACH | Refills: 3 | Status: SHIPPED | OUTPATIENT
Start: 2025-03-10

## 2025-03-10 NOTE — TELEPHONE ENCOUNTER
Requested Prescriptions     Pending Prescriptions Disp Refills    ibuprofen (ADVIL;MOTRIN) 600 MG tablet [Pharmacy Med Name: IBUPROFEN 600MG TABLETS] 90 tablet 1     Sig: TAKE 1 TABLET BY MOUTH EVERY 6 HOURS AS NEEDED FOR PAIN    B-D UF III MINI PEN NEEDLES 31G X 5 MM MISC [Pharmacy Med Name: B-D PEN NDL MINI 19GH4NR(3/16)PRPL] 100 each 3     Sig: USE TWICE DAILY     Date of last OV: 12.06.2024  Future OV visit scheduled:  [x] Yes -> Date: 04.08.2025   [] No    Last Refill: [] N/A - ibuprofen  Date: 02.23.2024  Qty: 90  # of refills: 1    Last Refill: [] N/A - pen needles   Date: 05.17.2023  Qty: 100  # of refills: 3    Med pending for provider review:  [x] Yes  [] No (provide reason why):     Requested Pharmacy updated in ENC:  [] Yes    Applicable labs (provide date of completion):  [] Diabetic med- A1c:  [] Cholesterol med- Lipids:  [] BP med- CMP or BMP:   [] Thyroid med- TSH:  [] Gout med- Uric acid:  [] Prostate med- PSA:  [] Other (provide what type of med and lab):    Additional notes:

## 2025-04-03 ENCOUNTER — TELEPHONE (OUTPATIENT)
Facility: CLINIC | Age: 70
End: 2025-04-03

## 2025-04-03 NOTE — TELEPHONE ENCOUNTER
----- Message from Neal CESAR sent at 4/3/2025 11:35 AM EDT -----  Regarding: E[facility] Appointment Request  E[facility] Appointment Request    Patient needs appointment for E[facility] Appointment Type: New to Provider.    Patient Requested Dates(s): next month  Patient Requested Time: around 2:00 pm   Provider Name: any available provider    Reason for Appointment Request: New Patient - Available appointments did not meet patient need  --------------------------------------------------------------------------------------------------------------------------    Relationship to Patient: Other Significant other Lavonne     Call Back Information: OK to leave message on voicemail  Preferred Call Back Number: Phone [phone]    Establish care + new to provider

## 2025-04-12 DIAGNOSIS — E78.2 MIXED HYPERLIPIDEMIA: ICD-10-CM

## 2025-04-14 NOTE — TELEPHONE ENCOUNTER
Requested Prescriptions     Pending Prescriptions Disp Refills    atorvastatin (LIPITOR) 20 MG tablet [Pharmacy Med Name: ATORVASTATIN 20MG TABLETS] 90 tablet 3     Sig: TAKE 1 TABLET BY MOUTH DAILY

## 2025-04-15 RX ORDER — ATORVASTATIN CALCIUM 20 MG/1
20 TABLET, FILM COATED ORAL DAILY
Qty: 90 TABLET | Refills: 3 | Status: SHIPPED | OUTPATIENT
Start: 2025-04-15

## 2025-05-01 DIAGNOSIS — Z79.4 TYPE 2 DIABETES MELLITUS WITH CHRONIC KIDNEY DISEASE, WITH LONG-TERM CURRENT USE OF INSULIN, UNSPECIFIED CKD STAGE (HCC): ICD-10-CM

## 2025-05-01 DIAGNOSIS — E11.22 TYPE 2 DIABETES MELLITUS WITH CHRONIC KIDNEY DISEASE, WITH LONG-TERM CURRENT USE OF INSULIN, UNSPECIFIED CKD STAGE (HCC): ICD-10-CM

## 2025-05-02 RX ORDER — METOPROLOL SUCCINATE 25 MG/1
25 TABLET, EXTENDED RELEASE ORAL DAILY
Qty: 90 TABLET | Refills: 1 | Status: SHIPPED | OUTPATIENT
Start: 2025-05-02

## 2025-05-02 RX ORDER — SITAGLIPTIN AND METFORMIN HYDROCHLORIDE 1000; 100 MG/1; MG/1
1 TABLET, FILM COATED, EXTENDED RELEASE ORAL DAILY
Qty: 90 TABLET | Refills: 1 | Status: SHIPPED | OUTPATIENT
Start: 2025-05-02

## 2025-05-15 ENCOUNTER — OFFICE VISIT (OUTPATIENT)
Facility: CLINIC | Age: 70
End: 2025-05-15
Payer: MEDICARE

## 2025-05-15 VITALS
SYSTOLIC BLOOD PRESSURE: 111 MMHG | WEIGHT: 145.6 LBS | OXYGEN SATURATION: 93 % | BODY MASS INDEX: 21.5 KG/M2 | TEMPERATURE: 98.6 F | HEART RATE: 92 BPM | DIASTOLIC BLOOD PRESSURE: 70 MMHG

## 2025-05-15 DIAGNOSIS — F17.200 TOBACCO USE DISORDER: ICD-10-CM

## 2025-05-15 DIAGNOSIS — I73.9 PAD (PERIPHERAL ARTERY DISEASE): ICD-10-CM

## 2025-05-15 DIAGNOSIS — I10 HYPERTENSION, UNSPECIFIED TYPE: ICD-10-CM

## 2025-05-15 DIAGNOSIS — Z85.46 HISTORY OF PROSTATE CANCER: ICD-10-CM

## 2025-05-15 DIAGNOSIS — Z91.81 AT HIGH RISK FOR FALLS: ICD-10-CM

## 2025-05-15 DIAGNOSIS — Z87.891 PERSONAL HISTORY OF TOBACCO USE: ICD-10-CM

## 2025-05-15 DIAGNOSIS — R06.09 DYSPNEA ON EXERTION: ICD-10-CM

## 2025-05-15 DIAGNOSIS — E11.8 CONTROLLED DIABETES MELLITUS TYPE 2 WITH COMPLICATIONS, UNSPECIFIED WHETHER LONG TERM INSULIN USE (HCC): ICD-10-CM

## 2025-05-15 DIAGNOSIS — Z79.4 TYPE 2 DIABETES MELLITUS WITH CHRONIC KIDNEY DISEASE, WITH LONG-TERM CURRENT USE OF INSULIN, UNSPECIFIED CKD STAGE (HCC): Primary | ICD-10-CM

## 2025-05-15 DIAGNOSIS — R63.4 WEIGHT LOSS: ICD-10-CM

## 2025-05-15 DIAGNOSIS — E11.22 TYPE 2 DIABETES MELLITUS WITH CHRONIC KIDNEY DISEASE, WITH LONG-TERM CURRENT USE OF INSULIN, UNSPECIFIED CKD STAGE (HCC): Primary | ICD-10-CM

## 2025-05-15 DIAGNOSIS — E78.5 HYPERLIPIDEMIA, UNSPECIFIED HYPERLIPIDEMIA TYPE: ICD-10-CM

## 2025-05-15 LAB — HBA1C MFR BLD: 7.3 %

## 2025-05-15 PROCEDURE — 83036 HEMOGLOBIN GLYCOSYLATED A1C: CPT | Performed by: STUDENT IN AN ORGANIZED HEALTH CARE EDUCATION/TRAINING PROGRAM

## 2025-05-15 PROCEDURE — 1159F MED LIST DOCD IN RCRD: CPT | Performed by: STUDENT IN AN ORGANIZED HEALTH CARE EDUCATION/TRAINING PROGRAM

## 2025-05-15 PROCEDURE — 3074F SYST BP LT 130 MM HG: CPT | Performed by: STUDENT IN AN ORGANIZED HEALTH CARE EDUCATION/TRAINING PROGRAM

## 2025-05-15 PROCEDURE — 3051F HG A1C>EQUAL 7.0%<8.0%: CPT | Performed by: STUDENT IN AN ORGANIZED HEALTH CARE EDUCATION/TRAINING PROGRAM

## 2025-05-15 PROCEDURE — 3078F DIAST BP <80 MM HG: CPT | Performed by: STUDENT IN AN ORGANIZED HEALTH CARE EDUCATION/TRAINING PROGRAM

## 2025-05-15 PROCEDURE — 1160F RVW MEDS BY RX/DR IN RCRD: CPT | Performed by: STUDENT IN AN ORGANIZED HEALTH CARE EDUCATION/TRAINING PROGRAM

## 2025-05-15 PROCEDURE — 99214 OFFICE O/P EST MOD 30 MIN: CPT | Performed by: STUDENT IN AN ORGANIZED HEALTH CARE EDUCATION/TRAINING PROGRAM

## 2025-05-15 PROCEDURE — 1126F AMNT PAIN NOTED NONE PRSNT: CPT | Performed by: STUDENT IN AN ORGANIZED HEALTH CARE EDUCATION/TRAINING PROGRAM

## 2025-05-15 PROCEDURE — 1123F ACP DISCUSS/DSCN MKR DOCD: CPT | Performed by: STUDENT IN AN ORGANIZED HEALTH CARE EDUCATION/TRAINING PROGRAM

## 2025-05-15 PROCEDURE — G0296 VISIT TO DETERM LDCT ELIG: HCPCS | Performed by: STUDENT IN AN ORGANIZED HEALTH CARE EDUCATION/TRAINING PROGRAM

## 2025-05-15 RX ORDER — CLOPIDOGREL BISULFATE 75 MG/1
75 TABLET ORAL DAILY
Qty: 90 TABLET | Refills: 1 | Status: SHIPPED | OUTPATIENT
Start: 2025-05-15

## 2025-05-15 RX ORDER — VARENICLINE TARTRATE 0.5 MG/1
.5-1 TABLET, FILM COATED ORAL SEE ADMIN INSTRUCTIONS
Qty: 57 TABLET | Refills: 0 | Status: SHIPPED | OUTPATIENT
Start: 2025-05-15

## 2025-05-15 SDOH — ECONOMIC STABILITY: FOOD INSECURITY: WITHIN THE PAST 12 MONTHS, YOU WORRIED THAT YOUR FOOD WOULD RUN OUT BEFORE YOU GOT MONEY TO BUY MORE.: NEVER TRUE

## 2025-05-15 SDOH — ECONOMIC STABILITY: FOOD INSECURITY: WITHIN THE PAST 12 MONTHS, THE FOOD YOU BOUGHT JUST DIDN'T LAST AND YOU DIDN'T HAVE MONEY TO GET MORE.: NEVER TRUE

## 2025-05-15 ASSESSMENT — PATIENT HEALTH QUESTIONNAIRE - PHQ9
SUM OF ALL RESPONSES TO PHQ QUESTIONS 1-9: 0
SUM OF ALL RESPONSES TO PHQ QUESTIONS 1-9: 0
2. FEELING DOWN, DEPRESSED OR HOPELESS: NOT AT ALL
SUM OF ALL RESPONSES TO PHQ QUESTIONS 1-9: 0
SUM OF ALL RESPONSES TO PHQ QUESTIONS 1-9: 0
1. LITTLE INTEREST OR PLEASURE IN DOING THINGS: NOT AT ALL

## 2025-05-15 NOTE — PROGRESS NOTES
Chief Complaint   Patient presents with    Annual Exam       /70   Pulse 92   Temp 98.6 °F (37 °C)   Wt 66 kg (145 lb 9.6 oz)   SpO2 93%   BMI 21.50 kg/m²     Have you been to the ER, urgent care clinic since your last visit?  Hospitalized since your last visit?   YES - When: approximately 1 months ago.  Where and Why[facility]    Have you seen or consulted any other health care providers outside our system since your last visit?   NO      “Have you had a diabetic eye exam?”    NO     Date of last diabetic eye exam: 9/9/2020

## 2025-05-15 NOTE — PROGRESS NOTES
Bang Denny (: 1955) is a 69 y.o. male, New patient patient, here for evaluation of the following chief complaint(s):  New Patient       ASSESSMENT/PLAN:  Below is the assessment and plan developed based on review of pertinent history, physical exam, labs, studies, and medications.    1. Type 2 diabetes mellitus with chronic kidney disease, with long-term current use of insulin, unspecified CKD stage (HCC)  -     AMB POC HEMOGLOBIN A1C  -     metFORMIN (GLUCOPHAGE) 1000 MG tablet; Take 1 tablet by mouth daily (with breakfast), Disp-90 tablet, R-1Normal  -     CBC; Future  -     Comprehensive Metabolic Panel; Future  -     Lipid Panel; Future  -     T4, Free; Future  -     TSH; Future  -     Albumin/Creatinine Ratio, Urine; Future  2. Hypertension, unspecified type  -     CBC; Future  -     Comprehensive Metabolic Panel; Future  -     Lipid Panel; Future  -     T4, Free; Future  -     TSH; Future  -     Albumin/Creatinine Ratio, Urine; Future  3. At high risk for falls  4. Personal history of tobacco use  -     NE VISIT TO DISCUSS LUNG CA SCREEN W LDCT  -     CT Lung Screen (Initial/Annual/Baseline); Future  5. Hyperlipidemia, unspecified hyperlipidemia type  6. PAD (peripheral artery disease)  -     clopidogrel (PLAVIX) 75 MG tablet; Take 1 tablet by mouth daily, Disp-90 tablet, R-1Normal  7. History of prostate cancer  -     [facility] - Manas Gerber MD, Urology, [place]  8. Weight loss  -     [facility] - Manas Gerber MD, Urology, [place]  9. Tobacco use disorder  -     varenicline (CHANTIX) 0.5 MG tablet; Take 1-2 tablets by mouth See Admin Instructions 0.5mg DAILY for 3 days followed by 0.5mg TWICE DAILY for 4 days followed by 1mg TWICE DAILY, Disp-57 tablet, R-0Normal  10. Dyspnea on exertion  -     [facility] - Dick Leal MD, Cardiology, [place]      New patient    Diabetes: Unsure why patient is both on Trulicity as well as sitagliptin, discontinue sitagliptin..  Continue Trulicity.

## 2025-05-16 LAB
ALBUMIN SERPL-MCNC: 4 G/DL (ref 3.8–4.8)
ALBUMIN/CREAT UR: 23 MG/G CREAT (ref 0–29)
ALP SERPL-CCNC: 53 IU/L (ref 44–121)
ALT SERPL-CCNC: 39 IU/L (ref 0–44)
AST SERPL-CCNC: 28 IU/L (ref 0–40)
BILIRUB SERPL-MCNC: 0.3 MG/DL (ref 0–1.2)
BUN SERPL-MCNC: 14 MG/DL (ref 8–27)
BUN/CREAT SERPL: 15 (ref 10–24)
CALCIUM SERPL-MCNC: 10 MG/DL (ref 8.6–10.2)
CHLORIDE SERPL-SCNC: 104 MMOL/L (ref 96–106)
CHOLEST SERPL-MCNC: 136 MG/DL (ref 100–199)
CO2 SERPL-SCNC: 17 MMOL/L (ref 20–29)
CREAT SERPL-MCNC: 0.92 MG/DL (ref 0.76–1.27)
CREAT UR-MCNC: 289.2 MG/DL
EGFRCR SERPLBLD CKD-EPI 2021: 89 ML/MIN/1.73
ERYTHROCYTE [DISTWIDTH] IN BLOOD BY AUTOMATED COUNT: 13 % (ref 11.6–15.4)
GLOBULIN SER CALC-MCNC: 2.4 G/DL (ref 1.5–4.5)
GLUCOSE SERPL-MCNC: 145 MG/DL (ref 70–99)
HCT VFR BLD AUTO: 36.7 % (ref 37.5–51)
HDLC SERPL-MCNC: 39 MG/DL
HGB BLD-MCNC: 11.9 G/DL (ref 13–17.7)
LDLC SERPL CALC-MCNC: 72 MG/DL (ref 0–99)
MCH RBC QN AUTO: 29.9 PG (ref 26.6–33)
MCHC RBC AUTO-ENTMCNC: 32.4 G/DL (ref 31.5–35.7)
MCV RBC AUTO: 92 FL (ref 79–97)
MICROALBUMIN UR-MCNC: 65.8 UG/ML
PLATELET # BLD AUTO: 311 X10E3/UL (ref 150–450)
POTASSIUM SERPL-SCNC: 5.6 MMOL/L (ref 3.5–5.2)
PROT SERPL-MCNC: 6.4 G/DL (ref 6–8.5)
RBC # BLD AUTO: 3.98 X10E6/UL (ref 4.14–5.8)
SODIUM SERPL-SCNC: 140 MMOL/L (ref 134–144)
T4 FREE SERPL-MCNC: 1.27 NG/DL (ref 0.82–1.77)
TRIGL SERPL-MCNC: 141 MG/DL (ref 0–149)
TSH SERPL DL<=0.005 MIU/L-ACNC: 1.29 UIU/ML (ref 0.45–4.5)
VLDLC SERPL CALC-MCNC: 25 MG/DL (ref 5–40)
WBC # BLD AUTO: 15.5 X10E3/UL (ref 3.4–10.8)

## 2025-05-20 DIAGNOSIS — E11.65 UNCONTROLLED TYPE 2 DIABETES MELLITUS WITH HYPERGLYCEMIA (HCC): ICD-10-CM

## 2025-05-20 DIAGNOSIS — E11.22 TYPE 2 DIABETES MELLITUS WITH CHRONIC KIDNEY DISEASE, WITH LONG-TERM CURRENT USE OF INSULIN, UNSPECIFIED CKD STAGE (HCC): ICD-10-CM

## 2025-05-20 DIAGNOSIS — Z79.4 TYPE 2 DIABETES MELLITUS WITH CHRONIC KIDNEY DISEASE, WITH LONG-TERM CURRENT USE OF INSULIN, UNSPECIFIED CKD STAGE (HCC): ICD-10-CM

## 2025-05-20 RX ORDER — DULAGLUTIDE 1.5 MG/.5ML
INJECTION, SOLUTION SUBCUTANEOUS
Qty: 6 ML | Refills: 1 | Status: SHIPPED | OUTPATIENT
Start: 2025-05-20

## 2025-05-20 NOTE — TELEPHONE ENCOUNTER
Requested Prescriptions     Pending Prescriptions Disp Refills    TRULICITY 1.5 MG/0.5ML SC injection [Pharmacy Med Name: TRULICITY 1.5MG/0.5ML INJ (4 PENS)] 6 mL      Sig: INJECT 1.5MG UNDE THE SKIN ONCE A WEKK.

## 2025-05-24 ENCOUNTER — HOSPITAL ENCOUNTER (OUTPATIENT)
Facility: HOSPITAL | Age: 70
Discharge: HOME | End: 2025-05-27
Attending: STUDENT IN AN ORGANIZED HEALTH CARE EDUCATION/TRAINING PROGRAM
Payer: MEDICARE

## 2025-05-24 DIAGNOSIS — Z87.891 PERSONAL HISTORY OF TOBACCO USE: ICD-10-CM

## 2025-05-24 PROCEDURE — 71271 CT THORAX LUNG CANCER SCR C-: CPT

## 2025-05-26 ENCOUNTER — RESULTS FOLLOW-UP (OUTPATIENT)
Facility: CLINIC | Age: 70
End: 2025-05-26

## 2025-05-26 DIAGNOSIS — D72.829 LEUKOCYTOSIS, UNSPECIFIED TYPE: Primary | ICD-10-CM

## 2025-05-26 DIAGNOSIS — D64.9 ANEMIA, UNSPECIFIED TYPE: ICD-10-CM

## 2025-05-26 DIAGNOSIS — E87.5 HYPERKALEMIA: ICD-10-CM

## 2025-05-26 ASSESSMENT — ENCOUNTER SYMPTOMS
COUGH: 0
SHORTNESS OF BREATH: 1
ABDOMINAL PAIN: 0

## 2025-06-08 DIAGNOSIS — D64.9 ANEMIA, UNSPECIFIED TYPE: ICD-10-CM

## 2025-06-08 DIAGNOSIS — E87.5 HYPERKALEMIA: ICD-10-CM

## 2025-06-08 DIAGNOSIS — D72.829 LEUKOCYTOSIS, UNSPECIFIED TYPE: ICD-10-CM

## 2025-06-22 ENCOUNTER — HOSPITAL ENCOUNTER (EMERGENCY)
Facility: HOSPITAL | Age: 70
Discharge: HOME | End: 2025-06-22
Attending: EMERGENCY MEDICINE
Payer: MEDICARE

## 2025-06-22 ENCOUNTER — APPOINTMENT (OUTPATIENT)
Facility: HOSPITAL | Age: 70
End: 2025-06-22
Payer: MEDICARE

## 2025-06-22 VITALS
HEIGHT: 69 IN | WEIGHT: 150 LBS | DIASTOLIC BLOOD PRESSURE: 76 MMHG | RESPIRATION RATE: 19 BRPM | OXYGEN SATURATION: 100 % | TEMPERATURE: 97.3 F | BODY MASS INDEX: 22.22 KG/M2 | HEART RATE: 111 BPM | SYSTOLIC BLOOD PRESSURE: 127 MMHG

## 2025-06-22 DIAGNOSIS — K59.00 CONSTIPATION, UNSPECIFIED CONSTIPATION TYPE: Primary | ICD-10-CM

## 2025-06-22 DIAGNOSIS — K56.41 FECAL IMPACTION IN RECTUM (HCC): ICD-10-CM

## 2025-06-22 LAB
ALBUMIN SERPL-MCNC: 2.9 G/DL (ref 3.5–5)
ALBUMIN/GLOB SERPL: 0.7 (ref 1.1–2.2)
ALP SERPL-CCNC: 54 U/L (ref 45–117)
ALT SERPL-CCNC: 36 U/L (ref 12–78)
ANION GAP SERPL CALC-SCNC: 19 MMOL/L (ref 2–12)
AST SERPL W P-5'-P-CCNC: 25 U/L (ref 15–37)
BASOPHILS # BLD: 0.06 K/UL (ref 0–0.1)
BASOPHILS NFR BLD: 0.6 % (ref 0–1)
BILIRUB SERPL-MCNC: 0.4 MG/DL (ref 0.2–1)
BUN SERPL-MCNC: 15 MG/DL (ref 6–20)
BUN/CREAT SERPL: 10 (ref 12–20)
CA-I BLD-MCNC: 9.5 MG/DL (ref 8.5–10.1)
CHLORIDE SERPL-SCNC: 98 MMOL/L (ref 97–108)
CO2 SERPL-SCNC: 20 MMOL/L (ref 21–32)
CREAT SERPL-MCNC: 1.55 MG/DL (ref 0.7–1.3)
DIFFERENTIAL METHOD BLD: ABNORMAL
EOSINOPHIL # BLD: 0.15 K/UL (ref 0–0.4)
EOSINOPHIL NFR BLD: 1.4 % (ref 0–7)
ERYTHROCYTE [DISTWIDTH] IN BLOOD BY AUTOMATED COUNT: 13.4 % (ref 11.5–14.5)
GLOBULIN SER CALC-MCNC: 3.9 G/DL (ref 2–4)
GLUCOSE SERPL-MCNC: 212 MG/DL (ref 65–100)
HCT VFR BLD AUTO: 37 % (ref 36.6–50.3)
HGB BLD-MCNC: 11.8 G/DL (ref 12.1–17)
IMM GRANULOCYTES # BLD AUTO: 0.03 K/UL (ref 0–0.04)
IMM GRANULOCYTES NFR BLD AUTO: 0.3 % (ref 0–0.5)
LIPASE SERPL-CCNC: 72 U/L (ref 13–75)
LYMPHOCYTES # BLD: 1.94 K/UL (ref 0.8–3.5)
LYMPHOCYTES NFR BLD: 18.2 % (ref 12–49)
MCH RBC QN AUTO: 29.6 PG (ref 26–34)
MCHC RBC AUTO-ENTMCNC: 31.9 G/DL (ref 30–36.5)
MCV RBC AUTO: 93 FL (ref 80–99)
MONOCYTES # BLD: 1.21 K/UL (ref 0–1)
MONOCYTES NFR BLD: 11.3 % (ref 5–13)
NEUTS SEG # BLD: 7.29 K/UL (ref 1.8–8)
NEUTS SEG NFR BLD: 68.2 % (ref 32–75)
PLATELET # BLD AUTO: 272 K/UL (ref 150–400)
PMV BLD AUTO: 9.8 FL (ref 8.9–12.9)
POTASSIUM SERPL-SCNC: 4.5 MMOL/L (ref 3.5–5.1)
PROT SERPL-MCNC: 6.8 G/DL (ref 6.4–8.2)
RBC # BLD AUTO: 3.98 M/UL (ref 4.1–5.7)
SODIUM SERPL-SCNC: 137 MMOL/L (ref 136–145)
WBC # BLD AUTO: 10.7 K/UL (ref 4.1–11.1)

## 2025-06-22 PROCEDURE — 85025 COMPLETE CBC W/AUTO DIFF WBC: CPT

## 2025-06-22 PROCEDURE — 6370000000 HC RX 637 (ALT 250 FOR IP): Performed by: EMERGENCY MEDICINE

## 2025-06-22 PROCEDURE — 74177 CT ABD & PELVIS W/CONTRAST: CPT

## 2025-06-22 PROCEDURE — 80053 COMPREHEN METABOLIC PANEL: CPT

## 2025-06-22 PROCEDURE — 6360000004 HC RX CONTRAST MEDICATION: Performed by: EMERGENCY MEDICINE

## 2025-06-22 PROCEDURE — 83690 ASSAY OF LIPASE: CPT

## 2025-06-22 PROCEDURE — 36415 COLL VENOUS BLD VENIPUNCTURE: CPT

## 2025-06-22 PROCEDURE — 2580000003 HC RX 258: Performed by: EMERGENCY MEDICINE

## 2025-06-22 PROCEDURE — 99285 EMERGENCY DEPT VISIT HI MDM: CPT

## 2025-06-22 RX ORDER — POLYETHYLENE GLYCOL 3350 17 G/17G
17 POWDER, FOR SOLUTION ORAL DAILY PRN
Qty: 510 G | Refills: 0 | Status: SHIPPED | OUTPATIENT
Start: 2025-06-22 | End: 2025-07-22

## 2025-06-22 RX ORDER — POLYETHYLENE GLYCOL 3350 17 G/17G
17 POWDER, FOR SOLUTION ORAL ONCE
Status: COMPLETED | OUTPATIENT
Start: 2025-06-22 | End: 2025-06-22

## 2025-06-22 RX ORDER — 0.9 % SODIUM CHLORIDE 0.9 %
1000 INTRAVENOUS SOLUTION INTRAVENOUS ONCE
Status: COMPLETED | OUTPATIENT
Start: 2025-06-22 | End: 2025-06-22

## 2025-06-22 RX ORDER — DOCUSATE SODIUM 100 MG/1
100 CAPSULE, LIQUID FILLED ORAL 2 TIMES DAILY
Qty: 28 CAPSULE | Refills: 0 | Status: SHIPPED | OUTPATIENT
Start: 2025-06-22 | End: 2025-07-06

## 2025-06-22 RX ORDER — IOPAMIDOL 755 MG/ML
100 INJECTION, SOLUTION INTRAVASCULAR ONCE
Status: COMPLETED | OUTPATIENT
Start: 2025-06-22 | End: 2025-06-22

## 2025-06-22 RX ORDER — LACTULOSE 10 G/15ML
30 SOLUTION ORAL
Status: COMPLETED | OUTPATIENT
Start: 2025-06-22 | End: 2025-06-22

## 2025-06-22 RX ADMIN — SODIUM CHLORIDE 1000 ML: 0.9 INJECTION, SOLUTION INTRAVENOUS at 17:06

## 2025-06-22 RX ADMIN — IOPAMIDOL 100 ML: 755 INJECTION, SOLUTION INTRAVENOUS at 16:51

## 2025-06-22 RX ADMIN — LACTULOSE 30 G: 20 SOLUTION ORAL at 17:47

## 2025-06-22 RX ADMIN — POLYETHYLENE GLYCOL 3350 17 G: 17 POWDER, FOR SOLUTION ORAL at 17:47

## 2025-06-22 ASSESSMENT — PAIN DESCRIPTION - LOCATION: LOCATION: GENERALIZED;BACK;RECTUM

## 2025-06-22 ASSESSMENT — PAIN - FUNCTIONAL ASSESSMENT: PAIN_FUNCTIONAL_ASSESSMENT: 0-10

## 2025-06-22 NOTE — DISCHARGE INSTRUCTIONS
Thank you for choosing our Emergency Department for your care.  It is our privilege to care for you in your time of need.  In the next several days, you may receive a survey via email or mailed to your home about your experience with our team.  We would greatly appreciate you taking a few minutes to complete the survey, as we use this information to learn what we have done well and what we could be doing better. Thank you for trusting us with your care!    Below you will find a list of your tests from today's visit.   Labs and Radiology Studies  Recent Results (from the past 12 hours)   CBC with Auto Differential    Collection Time: 06/22/25  3:41 PM   Result Value Ref Range    WBC 10.7 4.1 - 11.1 K/uL    RBC 3.98 (L) 4.10 - 5.70 M/uL    Hemoglobin 11.8 (L) 12.1 - 17.0 g/dL    Hematocrit 37.0 36.6 - 50.3 %    MCV 93.0 80.0 - 99.0 FL    MCH 29.6 26.0 - 34.0 PG    MCHC 31.9 30.0 - 36.5 g/dL    RDW 13.4 11.5 - 14.5 %    Platelets 272 150 - 400 K/uL    MPV 9.8 8.9 - 12.9 FL    Neutrophils % 68.2 32.0 - 75.0 %    Lymphocytes % 18.2 12.0 - 49.0 %    Monocytes % 11.3 5.0 - 13.0 %    Eosinophils % 1.4 0.0 - 7.0 %    Basophils % 0.6 0.0 - 1.0 %    Immature Granulocytes % 0.3 0.0 - 0.5 %    Neutrophils Absolute 7.29 1.80 - 8.00 K/UL    Lymphocytes Absolute 1.94 0.80 - 3.50 K/UL    Monocytes Absolute 1.21 (H) 0.00 - 1.00 K/UL    Eosinophils Absolute 0.15 0.00 - 0.40 K/UL    Basophils Absolute 0.06 0.00 - 0.10 K/UL    Immature Granulocytes Absolute 0.03 0.00 - 0.04 K/UL    Differential Type AUTOMATED     Comprehensive Metabolic Panel    Collection Time: 06/22/25  3:41 PM   Result Value Ref Range    Sodium 137 136 - 145 mmol/L    Potassium 4.5 3.5 - 5.1 mmol/L    Chloride 98 97 - 108 mmol/L    CO2 20 (L) 21 - 32 mmol/L    Anion Gap 19 (H) 2 - 12 mmol/L    Glucose 212 (H) 65 - 100 mg/dL    BUN 15 6 - 20 mg/dL    Creatinine 1.55 (H) 0.70 - 1.30 mg/dL    BUN/Creatinine Ratio 10 (L) 12 - 20      Est, Glom Filt Rate 48 (L) >60

## 2025-06-22 NOTE — ED PROVIDER NOTES
DAILY     aspirin 81 MG chewable tablet     atorvastatin 20 MG tablet  Commonly known as: LIPITOR  TAKE 1 TABLET BY MOUTH DAILY     cloNIDine 0.2 MG/24HR Ptwk  Commonly known as: CATAPRES  APPLY 1 PATCH TOPICALLY TO THE SKIN 1 TIME A WEEK     clopidogrel 75 MG tablet  Commonly known as: Plavix  Take 1 tablet by mouth daily     ibuprofen 600 MG tablet  Commonly known as: ADVIL;MOTRIN  TAKE 1 TABLET BY MOUTH EVERY 6 HOURS AS NEEDED FOR PAIN     lisinopril 40 MG tablet  Commonly known as: PRINIVIL;ZESTRIL  TAKE 1 TABLET BY MOUTH DAILY     metoprolol succinate 25 MG extended release tablet  Commonly known as: TOPROL XL  TAKE 1 TABLET BY MOUTH DAILY     tamsulosin 0.4 MG capsule  Commonly known as: FLOMAX  TAKE 1 CAPSULE BY MOUTH DAILY FOR ENLARGED PROSTATE     Toujeo SoloStar 300 UNIT/ML concentrated injection pen  Generic drug: insulin glargine (1 unit dial)  ADMINISTER 30 UNITS UNDER THE SKIN TWICE DAILY     Trulicity 1.5 MG/0.5ML SC injection  Generic drug: dulaglutide  INJECT 1.5MG UNDE THE SKIN ONCE A WEKK.     varenicline 0.5 MG tablet  Commonly known as: Chantix  Take 1-2 tablets by mouth See Admin Instructions 0.5mg DAILY for 3 days followed by 0.5mg TWICE DAILY for 4 days followed by 1mg TWICE DAILY               Where to Get Your Medications        These medications were sent to [id number] [place], [place] - [address] - P [phone] - F [phone] [address] [facility] [address]      Phone: [phone]   docusate sodium 100 MG capsule  polyethylene glycol 17 GM/SCOOP powder         DISCONTINUED MEDICATIONS:  Current Discharge Medication List          ED FINAL IMPRESSION     1. Constipation, unspecified constipation type    2. Fecal impaction in rectum (HCC)             I am the primary provider of record. Joshua Lock DO (electronically signed)    (Please note that parts of this dictation were completed with voice recognition software. Quite often unanticipated

## 2025-07-10 LAB — LEFT VENTRICULAR EJECTION FRACTION, EXTERNAL: NORMAL

## 2025-07-30 PROBLEM — Z85.46 HISTORY OF PROSTATE CANCER: Status: ACTIVE | Noted: 2025-07-30

## 2025-08-05 ENCOUNTER — OFFICE VISIT (OUTPATIENT)
Age: 70
End: 2025-08-05
Payer: MEDICARE

## 2025-08-05 VITALS
WEIGHT: 150 LBS | DIASTOLIC BLOOD PRESSURE: 84 MMHG | BODY MASS INDEX: 22.22 KG/M2 | HEIGHT: 69 IN | OXYGEN SATURATION: 100 % | HEART RATE: 118 BPM | SYSTOLIC BLOOD PRESSURE: 148 MMHG

## 2025-08-05 DIAGNOSIS — R35.1 NOCTURIA: Primary | ICD-10-CM

## 2025-08-05 DIAGNOSIS — Z85.46 HISTORY OF PROSTATE CANCER: ICD-10-CM

## 2025-08-05 PROCEDURE — 3079F DIAST BP 80-89 MM HG: CPT | Performed by: STUDENT IN AN ORGANIZED HEALTH CARE EDUCATION/TRAINING PROGRAM

## 2025-08-05 PROCEDURE — 1126F AMNT PAIN NOTED NONE PRSNT: CPT | Performed by: STUDENT IN AN ORGANIZED HEALTH CARE EDUCATION/TRAINING PROGRAM

## 2025-08-05 PROCEDURE — 99203 OFFICE O/P NEW LOW 30 MIN: CPT | Performed by: STUDENT IN AN ORGANIZED HEALTH CARE EDUCATION/TRAINING PROGRAM

## 2025-08-05 PROCEDURE — 1159F MED LIST DOCD IN RCRD: CPT | Performed by: STUDENT IN AN ORGANIZED HEALTH CARE EDUCATION/TRAINING PROGRAM

## 2025-08-05 PROCEDURE — 1123F ACP DISCUSS/DSCN MKR DOCD: CPT | Performed by: STUDENT IN AN ORGANIZED HEALTH CARE EDUCATION/TRAINING PROGRAM

## 2025-08-05 PROCEDURE — 3077F SYST BP >= 140 MM HG: CPT | Performed by: STUDENT IN AN ORGANIZED HEALTH CARE EDUCATION/TRAINING PROGRAM

## 2025-08-07 LAB — PSA SERPL-MCNC: <0.1 NG/ML (ref 0–4)

## 2025-09-06 ENCOUNTER — APPOINTMENT (OUTPATIENT)
Facility: HOSPITAL | Age: 70
End: 2025-09-06
Payer: MEDICARE

## 2025-09-06 ENCOUNTER — HOSPITAL ENCOUNTER (EMERGENCY)
Facility: HOSPITAL | Age: 70
Discharge: HOME | End: 2025-09-06
Attending: EMERGENCY MEDICINE
Payer: MEDICARE

## 2025-09-06 VITALS
TEMPERATURE: 97.7 F | RESPIRATION RATE: 18 BRPM | WEIGHT: 140 LBS | HEIGHT: 69 IN | DIASTOLIC BLOOD PRESSURE: 69 MMHG | HEART RATE: 97 BPM | BODY MASS INDEX: 20.73 KG/M2 | OXYGEN SATURATION: 99 % | SYSTOLIC BLOOD PRESSURE: 120 MMHG

## 2025-09-06 DIAGNOSIS — K59.00 CONSTIPATION, UNSPECIFIED CONSTIPATION TYPE: Primary | ICD-10-CM

## 2025-09-06 PROCEDURE — 6370000000 HC RX 637 (ALT 250 FOR IP): Performed by: EMERGENCY MEDICINE

## 2025-09-06 PROCEDURE — 74018 RADEX ABDOMEN 1 VIEW: CPT

## 2025-09-06 RX ORDER — POLYETHYLENE GLYCOL 3350 17 G/17G
17 POWDER ORAL DAILY
Qty: 225 G | Refills: 0 | Status: SHIPPED | OUTPATIENT
Start: 2025-09-06 | End: 2025-10-06

## 2025-09-06 RX ORDER — POLYETHYLENE GLYCOL 3350 17 G/17G
170 POWDER, FOR SOLUTION ORAL
Status: COMPLETED | OUTPATIENT
Start: 2025-09-06 | End: 2025-09-06

## 2025-09-06 RX ORDER — DOCUSATE SODIUM 100 MG/1
200 CAPSULE, LIQUID FILLED ORAL
Status: COMPLETED | OUTPATIENT
Start: 2025-09-06 | End: 2025-09-06

## 2025-09-06 RX ADMIN — POLYETHYLENE GLYCOL 3350 170 G: 17 POWDER, FOR SOLUTION ORAL at 21:38

## 2025-09-06 RX ADMIN — DOCUSATE SODIUM 200 MG: 100 CAPSULE, LIQUID FILLED ORAL at 21:37

## 2025-09-06 ASSESSMENT — PAIN - FUNCTIONAL ASSESSMENT: PAIN_FUNCTIONAL_ASSESSMENT: 0-10

## 2025-09-06 ASSESSMENT — PAIN DESCRIPTION - LOCATION: LOCATION: RECTUM

## 2025-09-06 ASSESSMENT — PAIN SCALES - GENERAL: PAINLEVEL_OUTOF10: 7

## (undated) DEVICE — MOUTHPIECE ENDOSCP 20X27MM --

## (undated) DEVICE — FORCEPS BX L240CM JAW DIA2.4MM ORNG L CAP W/ NDL DISP RAD

## (undated) DEVICE — CANNULA NASAL ADULT 10FT ETCO2/CO2 VENTFLO

## (undated) DEVICE — MASK ANES INF SZ 2 PREM TAIL VLV INFL PRT UNSCENTED SGL PT

## (undated) DEVICE — ENDOSCOPIC KIT 1.1+ DE BOWL